# Patient Record
Sex: FEMALE | Race: WHITE | NOT HISPANIC OR LATINO | Employment: OTHER | URBAN - METROPOLITAN AREA
[De-identification: names, ages, dates, MRNs, and addresses within clinical notes are randomized per-mention and may not be internally consistent; named-entity substitution may affect disease eponyms.]

---

## 2018-12-07 ENCOUNTER — HOSPITAL ENCOUNTER (EMERGENCY)
Facility: HOSPITAL | Age: 23
Discharge: HOME | End: 2018-12-07
Attending: EMERGENCY MEDICINE | Admitting: EMERGENCY MEDICINE
Payer: MEDICARE

## 2018-12-07 ENCOUNTER — APPOINTMENT (EMERGENCY)
Dept: CT IMAGING | Facility: HOSPITAL | Age: 23
End: 2018-12-07
Payer: MEDICARE

## 2018-12-07 VITALS
SYSTOLIC BLOOD PRESSURE: 107 MMHG | WEIGHT: 190 LBS | DIASTOLIC BLOOD PRESSURE: 58 MMHG | HEART RATE: 98 BPM | RESPIRATION RATE: 18 BRPM | TEMPERATURE: 96.8 F | BODY MASS INDEX: 34.96 KG/M2 | HEIGHT: 62 IN | OXYGEN SATURATION: 96 %
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
## 2018-12-07 DIAGNOSIS — G44.309 POST-TRAUMATIC HEADACHE: Primary | ICD-10-CM

## 2018-12-07 PROCEDURE — 70450 CT HEAD/BRAIN W/O DYE: CPT

## 2018-12-07 PROCEDURE — 99283 EMERGENCY DEPT VISIT LOW MDM: CPT

## 2018-12-07 RX ORDER — POLYETHYLENE GLYCOL 3350 17 G/17G
17 POWDER, FOR SOLUTION ORAL DAILY
COMMUNITY
End: 2019-07-31

## 2018-12-07 RX ORDER — LORAZEPAM 1 MG/1
0.5 TABLET ORAL EVERY 8 HOURS PRN
COMMUNITY
End: 2019-06-12

## 2018-12-07 RX ORDER — DOCUSATE SODIUM 100 MG/1
100 CAPSULE, LIQUID FILLED ORAL 2 TIMES DAILY
COMMUNITY
End: 2019-09-04 | Stop reason: HOSPADM

## 2018-12-07 RX ORDER — ACETAMINOPHEN 325 MG/1
650 TABLET ORAL EVERY 6 HOURS PRN
COMMUNITY
End: 2019-09-04 | Stop reason: HOSPADM

## 2018-12-07 RX ORDER — LAMOTRIGINE 100 MG/1
25 TABLET ORAL DAILY
COMMUNITY
End: 2019-06-12

## 2018-12-07 RX ORDER — ACETAMINOPHEN 325 MG/1
650 TABLET ORAL ONCE
Status: COMPLETED | OUTPATIENT
Start: 2018-12-07 | End: 2018-12-07

## 2018-12-07 RX ORDER — VENLAFAXINE HYDROCHLORIDE 150 MG/1
150 CAPSULE, EXTENDED RELEASE ORAL DAILY
COMMUNITY
End: 2019-07-17 | Stop reason: HOSPADM

## 2018-12-07 RX ORDER — DIVALPROEX SODIUM 500 MG/1
750 TABLET, EXTENDED RELEASE ORAL 2 TIMES DAILY
COMMUNITY
End: 2019-06-26 | Stop reason: HOSPADM

## 2018-12-07 RX ORDER — PSEUDOEPHEDRINE HCL 60 MG/1
60 TABLET ORAL EVERY 4 HOURS PRN
COMMUNITY
End: 2019-07-31

## 2018-12-07 RX ORDER — NORETHINDRONE ACETATE AND ETHINYL ESTRADIOL 1.5-30(21)
1 KIT ORAL DAILY
COMMUNITY
End: 2019-09-04 | Stop reason: HOSPADM

## 2018-12-07 RX ORDER — CARBOXYMETHYLCELLULOSE SODIUM 10 MG/ML
GEL OPHTHALMIC
COMMUNITY
End: 2019-07-31

## 2018-12-07 RX ORDER — MULTIVITAMIN
1 CAPSULE ORAL DAILY
COMMUNITY
End: 2019-07-31

## 2018-12-07 RX ORDER — QUETIAPINE FUMARATE 200 MG/1
300 TABLET, FILM COATED ORAL 2 TIMES DAILY
COMMUNITY
End: 2019-06-26 | Stop reason: HOSPADM

## 2018-12-07 RX ORDER — MELATONIN 10 MG
TABLET, SUBLINGUAL SUBLINGUAL
COMMUNITY
End: 2019-09-04 | Stop reason: HOSPADM

## 2018-12-07 RX ADMIN — ACETAMINOPHEN 650 MG: 325 TABLET, FILM COATED ORAL at 19:15

## 2018-12-07 NOTE — ED PROVIDER NOTES
History  Chief Complaint   Patient presents with    Head Injury     pt presents to ED c/o nausea and dizziness since she fell and hit her head during a football game  Denies LOC      49-year-old female with history of anxiety, depression, and traumatic brain injury presents emergency department for evaluation of headache and vomiting after a mechanical ground level fall earlier in the day  Patient states that approximately 4 hours prior to arrival, she slipped while playing outdoors with some friends, striking the back of her head  There was no loss of consciousness however she did vomit after the initial injury  At this time patient complains of lightheadedness, denies vertigo, denies vision changes, neck pain, extremity paresthesias, chest pain, shortness of breath  She does not take any blood thinning medications  Prior to Admission Medications   Prescriptions Last Dose Informant Patient Reported? Taking?    Carboxymethylcellulose Sod PF (REFRESH CELLUVISC) 1 % GEL   Yes Yes   Sig: Apply to eye   Cetirizine HCl 10 MG CAPS   Yes Yes   Sig: Take by mouth   Cholecalciferol (VITAMIN D3) 01560 units TABS   Yes Yes   Sig: Take by mouth   LORazepam (ATIVAN) 1 mg tablet   Yes Yes   Sig: Take 0 5 mg by mouth every 8 (eight) hours as needed for anxiety   Multiple Vitamin (MULTIVITAMIN) capsule   Yes Yes   Sig: Take 1 capsule by mouth daily   QUEtiapine (SEROquel) 200 mg tablet   Yes Yes   Sig: Take 25 mg by mouth daily at bedtime   acetaminophen (TYLENOL) 325 mg tablet   Yes Yes   Sig: Take 650 mg by mouth every 6 (six) hours as needed for mild pain   divalproex sodium (DEPAKOTE ER) 500 mg 24 hr tablet   Yes Yes   Sig: Take 1,000 mg by mouth daily   docusate sodium (COLACE) 100 mg capsule   Yes Yes   Sig: Take 100 mg by mouth 2 (two) times a day   fluticasone (FLOVENT DISKUS) 50 MCG/BLIST diskus inhaler   Yes Yes   Sig: Inhale 1 puff 2 (two) times a day   lamoTRIgine (LaMICtal) 100 mg tablet   Yes Yes Sig: Take 25 mg by mouth daily   norethindrone-ethinyl estradiol-iron (MICROGESTIN FE1 5/30) 1 5-30 MG-MCG tablet   Yes Yes   Sig: Take 1 tablet by mouth daily   polyethylene glycol (MIRALAX) 17 g packet   Yes Yes   Sig: Take 17 g by mouth daily   pseudoephedrine (SUDAFED) 60 mg tablet   Yes Yes   Sig: Take 60 mg by mouth every 4 (four) hours as needed for congestion   venlafaxine (EFFEXOR-XR) 150 mg 24 hr capsule   Yes Yes   Sig: Take 150 mg by mouth daily      Facility-Administered Medications: None       Past Medical History:   Diagnosis Date    Anxiety     Depression     Seizures ([facility] [place] [address] )     Traumatic brain injury ([facility] )        History reviewed  No pertinent surgical history  History reviewed  No pertinent family history  I have reviewed and agree with the history as documented  Social History   Substance Use Topics    Smoking status: Never Smoker    Smokeless tobacco: Never Used    Alcohol use No        Review of Systems   Constitutional: Negative for appetite change, chills, diaphoresis and fever  HENT: Negative for congestion and sore throat  Eyes: Negative for photophobia and visual disturbance  Respiratory: Negative for cough and shortness of breath  Cardiovascular: Negative for chest pain  Gastrointestinal: Positive for nausea and vomiting  Musculoskeletal: Negative for back pain and neck pain  Skin: Negative for rash  Neurological: Positive for headaches  Negative for dizziness, weakness, light-headedness and numbness  Hematological: Does not bruise/bleed easily  Psychiatric/Behavioral: Negative for confusion and hallucinations  Physical Exam  Physical Exam   Constitutional: She is oriented to person, place, and time  She appears well-developed and well-nourished  No distress  HENT:   Head: Normocephalic and atraumatic  Mouth/Throat: Oropharynx is clear and moist    Eyes: Pupils are equal, round, and reactive to light  No scleral icterus     Cardiovascular: Normal rate and regular rhythm  Exam reveals no gallop and no friction rub  No murmur heard  Pulmonary/Chest: No respiratory distress  She has no wheezes  She has no rales  Abdominal: Soft  Bowel sounds are normal  She exhibits no distension  There is no tenderness  Musculoskeletal:        Cervical back: She exhibits normal range of motion, no bony tenderness and no deformity  Neurological: She is alert and oriented to person, place, and time  No cranial nerve deficit  Gait steady without deficit  Cerebellar testing normal, no dysdiadochokinesia or dysmetria  Bilateral upper and lower extremity sensation and strength is intact in all fields and symmetric   Skin: Skin is dry  Capillary refill takes less than 2 seconds  She is not diaphoretic  Psychiatric: She has a normal mood and affect  Her behavior is normal    Vitals reviewed  Vital Signs  ED Triage Vitals [12/07/18 1823]   Temperature Pulse Respirations Blood Pressure SpO2   (!) 96 8 °F (36 °C) (!) 109 19 114/56 96 %      Temp Source Heart Rate Source Patient Position - Orthostatic VS BP Location FiO2 (%)   Temporal Monitor Sitting Right arm --      Pain Score       No Pain           Vitals:    12/07/18 1823 12/07/18 1830 12/07/18 1845   BP: 114/56 103/57 107/58   Pulse: (!) 109 102 98   Patient Position - Orthostatic VS: Sitting         Visual Acuity      ED Medications  Medications   acetaminophen (TYLENOL) tablet 650 mg (650 mg Oral Given 12/7/18 1915)       Diagnostic Studies  Results Reviewed     None                 CT head without contrast   Final Result by Nancy Carson MD (12/07 1907)   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  No acute intracranial abnormality                    Workstation performed: [id number]                    Procedures  Procedures       Phone Contacts  ED Phone Contact    ED Course                               MDM  Number of Diagnoses or Management Options  Fall from ground level:   Post-traumatic headache:   Diagnosis management comments: 75-year-old female presents with headache and vomiting after mechanical ground level fall  Given her history of traumatic brain injury, although her neurologic exam was nonfocal, CT of the head was obtained to rule out ICH or skull fracture  CT of the head was unremarkable  Patient educated on concussion precautions, recommend Tylenol as needed for pain control  Amount and/or Complexity of Data Reviewed  Tests in the radiology section of CPT®: ordered and reviewed  Review and summarize past medical records: yes  Independent visualization of images, tracings, or specimens: yes      CritCare Time    Disposition  Final diagnoses:   Post-traumatic headache   Fall from ground level     Time reflects when diagnosis was documented in both MDM as applicable and the Disposition within this note     Time User Action Codes Description Comment    12/7/2018  7:09 PM Luba Tsang [[id number]] Post-traumatic headache     12/7/2018  7:09 PM Cristina Fernandez Fall from ground level       ED Disposition     ED Disposition Condition Comment    Discharge  Tameka Alvarado discharge to home/self care  Condition at discharge: Good        Follow-up Information     Follow up With Specialties Details Why Contact Maude Ro, DO   As needed [facility] [address] [facility] [place]            Discharge Medication List as of 12/7/2018  7:09 PM      CONTINUE these medications which have NOT CHANGED    Details   acetaminophen (TYLENOL) 325 mg tablet Take 650 mg by mouth every 6 (six) hours as needed for mild pain, Historical Med      Carboxymethylcellulose Sod PF (REFRESH CELLUVISC) 1 % GEL Apply to eye, Historical Med      Cetirizine HCl 10 MG CAPS Take by mouth, Historical Med      Cholecalciferol (VITAMIN D3) 96329 units TABS Take by mouth, Historical Med      divalproex sodium (DEPAKOTE ER) 500 mg 24 hr tablet Take 1,000 mg by mouth daily, Historical Med      docusate sodium (COLACE) 100 mg capsule Take 100 mg by mouth 2 (two) times a day, Historical Med      fluticasone (FLOVENT DISKUS) 50 MCG/BLIST diskus inhaler Inhale 1 puff 2 (two) times a day, Historical Med      lamoTRIgine (LaMICtal) 100 mg tablet Take 25 mg by mouth daily, Historical Med      LORazepam (ATIVAN) 1 mg tablet Take 0 5 mg by mouth every 8 (eight) hours as needed for anxiety, Historical Med      Multiple Vitamin (MULTIVITAMIN) capsule Take 1 capsule by mouth daily, Historical Med      norethindrone-ethinyl estradiol-iron (MICROGESTIN FE1 5/30) 1 5-30 MG-MCG tablet Take 1 tablet by mouth daily, Historical Med      polyethylene glycol (MIRALAX) 17 g packet Take 17 g by mouth daily, Historical Med      pseudoephedrine (SUDAFED) 60 mg tablet Take 60 mg by mouth every 4 (four) hours as needed for congestion, Historical Med      QUEtiapine (SEROquel) 200 mg tablet Take 25 mg by mouth daily at bedtime, Historical Med      venlafaxine (EFFEXOR-XR) 150 mg 24 hr capsule Take 150 mg by mouth daily, Historical Med           No discharge procedures on file      ED Provider  Electronically Signed by           Darrin Chan PA-C  12/07/18 4004

## 2018-12-08 NOTE — DISCHARGE INSTRUCTIONS
Acute Headache   WHAT YOU NEED TO KNOW:   An acute headache is pain or discomfort that starts suddenly and gets worse quickly  You may have an acute headache only when you feel stress or eat certain foods  Other acute headache pain can happen every day, and sometimes several times a day  DISCHARGE INSTRUCTIONS:   Return to the emergency department if:   · You have severe pain  · You have numbness or weakness on one side of your face or body  · You have a headache that occurs after a blow to the head, a fall, or other trauma  · You have a headache, are forgetful or confused, or have trouble speaking  · You have a headache, stiff neck, and a fever  Contact your healthcare provider if:   · You have a constant headache and are vomiting  · You have a headache each day that does not get better, even after treatment  · You have changes in your headaches, or new symptoms that occur when you have a headache  · You have questions or concerns about your condition or care  Medicines: You may need any of the following:  · Prescription pain medicine  may be given  The medicine your healthcare provider recommends will depend on the kind of headaches you have  You will need to take prescription headache medicines as directed to prevent a problem called rebound headache  These headaches happen with regular use of pain relievers for headache disorders  · NSAIDs , such as ibuprofen, help decrease swelling, pain, and fever  This medicine is available with or without a doctor's order  NSAIDs can cause stomach bleeding or kidney problems in certain people  If you take blood thinner medicine, always ask your healthcare provider if NSAIDs are safe for you  Always read the medicine label and follow directions  · Acetaminophen  decreases pain and fever  It is available without a doctor's order  Ask how much to take and how often to take it  Follow directions   Read the labels of all other medicines you are using to see if they also contain acetaminophen, or ask your doctor or pharmacist  Acetaminophen can cause liver damage if not taken correctly  Do not use more than 3 grams (3,000 milligrams) total of acetaminophen in one day  · Antidepressants  may be given for some kinds of headaches  · Take your medicine as directed  Contact your healthcare provider if you think your medicine is not helping or if you have side effects  Tell him or her if you are allergic to any medicine  Keep a list of the medicines, vitamins, and herbs you take  Include the amounts, and when and why you take them  Bring the list or the pill bottles to follow-up visits  Carry your medicine list with you in case of an emergency  Manage your symptoms:   · Apply heat or ice  on the headache area  Use a heat or ice pack  For an ice pack, you can also put crushed ice in a plastic bag  Cover the pack or bag with a towel before you apply it to your skin  Ice and heat both help decrease pain, and heat also helps decrease muscle spasms  Apply heat for 20 to 30 minutes every 2 hours  Apply ice for 15 to 20 minutes every hour  Apply heat or ice for as long and for as many days as directed  You may alternate heat and ice  · Relax your muscles  Lie down in a comfortable position and close your eyes  Relax your muscles slowly  Start at your toes and work your way up your body  · Keep a record of your headaches  Write down when your headaches start and stop  Include your symptoms and what you were doing when the headache began  Record what you ate or drank for 24 hours before the headache started  Describe the pain and where it hurts  Keep track of what you did to treat your headache and if it worked  Prevent an acute headache:   · Avoid anything that triggers an acute headache  Examples include exposure to chemicals, going to high altitude, or not getting enough sleep  Create a regular sleep routine   Go to sleep at the same time and wake up at the same time each day  Do not use electronic devices before bedtime  These may trigger a headache or prevent you from sleeping well  · Do not smoke  Nicotine and other chemicals in cigarettes and cigars can trigger an acute headache or make it worse  Ask your healthcare provider for information if you currently smoke and need help to quit  E-cigarettes or smokeless tobacco still contain nicotine  Talk to your healthcare provider before you use these products  · Limit alcohol as directed  Alcohol can trigger an acute headache or make it worse  If you have cluster headaches, do not drink alcohol during an episode  For other types of headaches, ask your healthcare provider if it is safe for you to drink alcohol  Ask how much is safe for you to drink, and how often  · Exercise as directed  Exercise can reduce tension and help with headache pain  Aim for 30 minutes of physical activity on most days of the week  Your healthcare provider can help you create an exercise plan  · Eat a variety of healthy foods  Healthy foods include fruits, vegetables, low-fat dairy products, lean meats, fish, whole grains, and cooked beans  Your healthcare provider or dietitian can help you create meals plans if you need to avoid foods that trigger headaches  Follow up with your healthcare provider as directed:  Bring your headache record with you when you see your healthcare provider  Write down your questions so you remember to ask them during your visits  © 2017 [employer] [facility] Information is for End User's use only and may not be sold, redistributed or otherwise used for commercial purposes  All illustrations and images included in CareNotes® are the copyrighted property of A D [employer] M [employer]  or [employer]  The above information is an  only  It is not intended as medical advice for individual conditions or treatments   Talk to your doctor, nurse or pharmacist before following any medical regimen to see if it is safe and effective for you

## 2019-03-21 ENCOUNTER — HOSPITAL ENCOUNTER (EMERGENCY)
Facility: HOSPITAL | Age: 24
Discharge: HOME | End: 2019-03-22
Attending: EMERGENCY MEDICINE
Payer: MEDICARE

## 2019-03-21 VITALS
HEART RATE: 111 BPM | TEMPERATURE: 97.5 F | RESPIRATION RATE: 18 BRPM | DIASTOLIC BLOOD PRESSURE: 63 MMHG | OXYGEN SATURATION: 98 % | SYSTOLIC BLOOD PRESSURE: 120 MMHG

## 2019-03-21 DIAGNOSIS — F32.A DEPRESSION: Primary | ICD-10-CM

## 2019-03-21 DIAGNOSIS — R45.851 SUICIDAL IDEATION: ICD-10-CM

## 2019-03-21 LAB
ALBUMIN SERPL BCP-MCNC: 3.5 G/DL (ref 3.5–5)
ALP SERPL-CCNC: 40 U/L (ref 46–116)
ALT SERPL W P-5'-P-CCNC: 51 U/L (ref 12–78)
AMPHETAMINES SERPL QL SCN: NEGATIVE
ANION GAP SERPL CALCULATED.3IONS-SCNC: 9 MMOL/L (ref 4–13)
AST SERPL W P-5'-P-CCNC: 24 U/L (ref 5–45)
BARBITURATES UR QL: NEGATIVE
BASOPHILS # BLD AUTO: 0.01 THOUSANDS/ΜL (ref 0–0.1)
BASOPHILS NFR BLD AUTO: 0 % (ref 0–1)
BENZODIAZ UR QL: NEGATIVE
BILIRUB SERPL-MCNC: 0.2 MG/DL (ref 0.2–1)
BUN SERPL-MCNC: 14 MG/DL (ref 5–25)
CALCIUM SERPL-MCNC: 9.1 MG/DL (ref 8.3–10.1)
CHLORIDE SERPL-SCNC: 103 MMOL/L (ref 100–108)
CO2 SERPL-SCNC: 27 MMOL/L (ref 21–32)
COCAINE UR QL: NEGATIVE
CREAT SERPL-MCNC: 0.89 MG/DL (ref 0.6–1.3)
EOSINOPHIL # BLD AUTO: 0 THOUSAND/ΜL (ref 0–0.61)
EOSINOPHIL NFR BLD AUTO: 0 % (ref 0–6)
ERYTHROCYTE [DISTWIDTH] IN BLOOD BY AUTOMATED COUNT: 12 % (ref 11.6–15.1)
ETHANOL EXG-MCNC: 0 MG/DL
GFR SERPL CREATININE-BSD FRML MDRD: 90 ML/MIN/1.73SQ M
GLUCOSE SERPL-MCNC: 100 MG/DL (ref 65–140)
HCT VFR BLD AUTO: 40.8 % (ref 34.8–46.1)
HGB BLD-MCNC: 13.5 G/DL (ref 11.5–15.4)
IMM GRANULOCYTES # BLD AUTO: 0.01 THOUSAND/UL (ref 0–0.2)
IMM GRANULOCYTES NFR BLD AUTO: 0 % (ref 0–2)
LYMPHOCYTES # BLD AUTO: 1.96 THOUSANDS/ΜL (ref 0.6–4.47)
LYMPHOCYTES NFR BLD AUTO: 40 % (ref 14–44)
MCH RBC QN AUTO: 32.5 PG (ref 26.8–34.3)
MCHC RBC AUTO-ENTMCNC: 33.1 G/DL (ref 31.4–37.4)
MCV RBC AUTO: 98 FL (ref 82–98)
METHADONE UR QL: NEGATIVE
MONOCYTES # BLD AUTO: 0.35 THOUSAND/ΜL (ref 0.17–1.22)
MONOCYTES NFR BLD AUTO: 7 % (ref 4–12)
NEUTROPHILS # BLD AUTO: 2.61 THOUSANDS/ΜL (ref 1.85–7.62)
NEUTS SEG NFR BLD AUTO: 53 % (ref 43–75)
NRBC BLD AUTO-RTO: 0 /100 WBCS
OPIATES UR QL SCN: NEGATIVE
PCP UR QL: NEGATIVE
PLATELET # BLD AUTO: 169 THOUSANDS/UL (ref 149–390)
PMV BLD AUTO: 9.5 FL (ref 8.9–12.7)
POTASSIUM SERPL-SCNC: 4 MMOL/L (ref 3.5–5.3)
PROT SERPL-MCNC: 7 G/DL (ref 6.4–8.2)
RBC # BLD AUTO: 4.15 MILLION/UL (ref 3.81–5.12)
SODIUM SERPL-SCNC: 139 MMOL/L (ref 136–145)
THC UR QL: NEGATIVE
VALPROATE SERPL-MCNC: 80 UG/ML (ref 50–100)
WBC # BLD AUTO: 4.94 THOUSAND/UL (ref 4.31–10.16)

## 2019-03-21 PROCEDURE — 82075 ASSAY OF BREATH ETHANOL: CPT | Performed by: EMERGENCY MEDICINE

## 2019-03-21 PROCEDURE — 99285 EMERGENCY DEPT VISIT HI MDM: CPT

## 2019-03-21 PROCEDURE — 80164 ASSAY DIPROPYLACETIC ACD TOT: CPT | Performed by: EMERGENCY MEDICINE

## 2019-03-21 PROCEDURE — 85025 COMPLETE CBC W/AUTO DIFF WBC: CPT | Performed by: EMERGENCY MEDICINE

## 2019-03-21 PROCEDURE — 80307 DRUG TEST PRSMV CHEM ANLYZR: CPT | Performed by: EMERGENCY MEDICINE

## 2019-03-21 PROCEDURE — 80053 COMPREHEN METABOLIC PANEL: CPT | Performed by: EMERGENCY MEDICINE

## 2019-03-21 PROCEDURE — 36415 COLL VENOUS BLD VENIPUNCTURE: CPT | Performed by: EMERGENCY MEDICINE

## 2019-03-22 NOTE — ED NOTES
Awaiting crisis  Pt currently calm, cooperative and appropriate  Pt is A=ox4  resp unlabored   Care taker bedside     John Mohamud RN  03/21/19 9098

## 2019-03-22 NOTE — ED PROVIDER NOTES
History  Chief Complaint   Patient presents with    Suicidal     Pt was having rough day and decided that she wnated to kill herself  Pt has no plan  Pt wants to put a bomb in the [place] building  This is a 57-year-old female who presents from [facility] for crisis evaluation  She states that she has had a stressful day and is having suicidal ideations without any specific plan  She has a history of traumatic brain injury and is accompanied by caretaker  History provided by:  Patient and caregiver  Psychiatric Evaluation   Presenting symptoms: suicidal thoughts    Patient accompanied by:  Caregiver  Onset quality:  Unable to specify  Duration:  1 day  Timing:  Constant  Progression:  Unchanged  Chronicity:  Recurrent      Prior to Admission Medications   Prescriptions Last Dose Informant Patient Reported? Taking?    Carboxymethylcellulose Sod PF (REFRESH CELLUVISC) 1 % GEL   Yes No   Sig: Apply to eye   Cetirizine HCl 10 MG CAPS   Yes No   Sig: Take by mouth   Cholecalciferol (VITAMIN D3) 73769 units TABS   Yes No   Sig: Take by mouth   LORazepam (ATIVAN) 1 mg tablet   Yes No   Sig: Take 0 5 mg by mouth every 8 (eight) hours as needed for anxiety   Multiple Vitamin (MULTIVITAMIN) capsule   Yes No   Sig: Take 1 capsule by mouth daily   QUEtiapine (SEROquel) 200 mg tablet   Yes No   Sig: Take 25 mg by mouth daily at bedtime   acetaminophen (TYLENOL) 325 mg tablet   Yes No   Sig: Take 650 mg by mouth every 6 (six) hours as needed for mild pain   divalproex sodium (DEPAKOTE ER) 500 mg 24 hr tablet   Yes No   Sig: Take 1,000 mg by mouth daily   docusate sodium (COLACE) 100 mg capsule   Yes No   Sig: Take 100 mg by mouth 2 (two) times a day   fluticasone (FLOVENT DISKUS) 50 MCG/BLIST diskus inhaler   Yes No   Sig: Inhale 1 puff 2 (two) times a day   lamoTRIgine (LaMICtal) 100 mg tablet   Yes No   Sig: Take 25 mg by mouth daily   norethindrone-ethinyl estradiol-iron (MICROGESTIN FE1 5/30) 1 5-30 MG-MCG tablet   Yes No   Sig: Take 1 tablet by mouth daily   polyethylene glycol (MIRALAX) 17 g packet   Yes No   Sig: Take 17 g by mouth daily   pseudoephedrine (SUDAFED) 60 mg tablet   Yes No   Sig: Take 60 mg by mouth every 4 (four) hours as needed for congestion   venlafaxine (EFFEXOR-XR) 150 mg 24 hr capsule   Yes No   Sig: Take 150 mg by mouth daily      Facility-Administered Medications: None       Past Medical History:   Diagnosis Date    Anxiety     Depression     Seizures ([facility] )     Traumatic brain injury ([facility] )        History reviewed  No pertinent surgical history  History reviewed  No pertinent family history  I have reviewed and agree with the history as documented  Social History     Tobacco Use    Smoking status: Never Smoker    Smokeless tobacco: Never Used   Substance Use Topics    Alcohol use: No    Drug use: No        Review of Systems   Psychiatric/Behavioral: Positive for suicidal ideas  All other systems reviewed and are negative  Physical Exam  Physical Exam   Constitutional: She is oriented to person, place, and time  She appears well-developed and well-nourished  No distress  HENT:   Right Ear: External ear normal    Left Ear: External ear normal    Nose: Nose normal    Mouth/Throat: Oropharynx is clear and moist    Eyes: Pupils are equal, round, and reactive to light  EOM are normal  Right eye exhibits no discharge  Left eye exhibits no discharge  No scleral icterus  Neck: Neck supple  No JVD present  No tracheal deviation present  Cardiovascular: Regular rhythm and intact distal pulses  Exam reveals no gallop and no friction rub  No murmur heard  Tachycardic   Pulmonary/Chest: Effort normal and breath sounds normal  No stridor  No respiratory distress  She has no wheezes  She has no rales  Abdominal: Soft  Bowel sounds are normal  She exhibits no distension and no mass  There is no tenderness  There is no rebound and no guarding     Musculoskeletal: Normal range of motion  She exhibits no edema, tenderness or deformity  Neurological: She is alert and oriented to person, place, and time  No cranial nerve deficit or sensory deficit  She exhibits normal muscle tone  Skin: Skin is warm and dry  No rash noted  She is not diaphoretic  Psychiatric:   Flat affect with suicidal ideations without specific plan   Nursing note and vitals reviewed        Vital Signs  ED Triage Vitals [03/21/19 2051]   Temperature Pulse Respirations Blood Pressure SpO2   97 5 °F (36 4 °C) (!) 111 18 120/63 98 %      Temp Source Heart Rate Source Patient Position - Orthostatic VS BP Location FiO2 (%)   Temporal Monitor Sitting Right arm --      Pain Score       --           Vitals:    03/21/19 2051   BP: 120/63   Pulse: (!) 111   Patient Position - Orthostatic VS: Sitting         Visual Acuity      ED Medications  Medications - No data to display    Diagnostic Studies  Results Reviewed     Procedure Component Value Units Date/Time    Valproic acid level, total [[id number]]  (Normal) Collected:  03/21/19 2120    Lab Status:  Final result Specimen:  Blood from Arm, Left Updated:  03/21/19 2158     Valproic Acid, Total 80 ug/mL     Comprehensive metabolic panel [[id number]]  (Abnormal) Collected:  03/21/19 2120    Lab Status:  Final result Specimen:  Blood from Arm, Left Updated:  03/21/19 2156     Sodium 139 mmol/L      Potassium 4 0 mmol/L      Chloride 103 mmol/L      CO2 27 mmol/L      ANION GAP 9 mmol/L      BUN 14 mg/dL      Creatinine 0 89 mg/dL      Glucose 100 mg/dL      Calcium 9 1 mg/dL      AST 24 U/L      ALT 51 U/L      Alkaline Phosphatase 40 U/L      Total Protein 7 0 g/dL      Albumin 3 5 g/dL      Total Bilirubin 0 20 mg/dL      eGFR 90 ml/min/1 73sq m     Narrative:       National Kidney Disease Education Program recommendations are as follows:  GFR calculation is accurate only with a steady state creatinine  Chronic Kidney disease less than 60 ml/min/1 73 sq  meters  Kidney failure less than 15 ml/min/1 73 sq  meters  Rapid drug screen, urine [[id number]]  (Normal) Collected:  03/21/19 2122    Lab Status:  Final result Specimen:  Urine, Clean Catch Updated:  03/21/19 2149     Amph/Meth UR Negative     Barbiturate Ur Negative     Benzodiazepine Urine Negative     Cocaine Urine Negative     Methadone Urine Negative     Opiate Urine Negative     PCP Ur Negative     THC Urine Negative    Narrative:       FOR MEDICAL PURPOSES ONLY  IF CONFIRMATION NEEDED PLEASE CONTACT THE LAB WITHIN 5 DAYS    Drug Screen Cutoff Levels:  AMPHETAMINE/METHAMPHETAMINES  1000 ng/mL  BARBITURATES     200 ng/mL  BENZODIAZEPINES     200 ng/mL  COCAINE      300 ng/mL  METHADONE      300 ng/mL  OPIATES      300 ng/mL  PHENCYCLIDINE     25 ng/mL  THC       50 ng/mL    CBC and differential [[id number]] Collected:  03/21/19 2120    Lab Status:  Final result Specimen:  Blood from Arm, Left Updated:  03/21/19 2140     WBC 4 94 Thousand/uL      RBC 4 15 Million/uL      Hemoglobin 13 5 g/dL      Hematocrit 40 8 %      MCV 98 fL      MCH 32 5 pg      MCHC 33 1 g/dL      RDW 12 0 %      MPV 9 5 fL      Platelets 100 Thousands/uL      nRBC 0 /100 WBCs      Neutrophils Relative 53 %      Immat GRANS % 0 %      Lymphocytes Relative 40 %      Monocytes Relative 7 %      Eosinophils Relative 0 %      Basophils Relative 0 %      Neutrophils Absolute 2 61 Thousands/µL      Immature Grans Absolute 0 01 Thousand/uL      Lymphocytes Absolute 1 96 Thousands/µL      Monocytes Absolute 0 35 Thousand/µL      Eosinophils Absolute 0 00 Thousand/µL      Basophils Absolute 0 01 Thousands/µL     POCT alcohol breath test [[id number]]  (Normal) Resulted:  03/21/19 2135    Lab Status:  Final result Updated:  03/21/19 2135     EXTBreath Alcohol 0 000                 No orders to display              Procedures  Procedures       Phone Contacts  ED Phone Contact    ED Course  ED Course as of Mar 21 2336   Thu Mar 21, 2019   2334 Evaluated by crisis at this time patient stable for discharge and outpatient follow-up she has suicidal thoughts without any feasible plan  [facility]  Number of Diagnoses or Management Options  Diagnosis management comments: Suicidal ideations will clear her medically and have crisis evaluation       Amount and/or Complexity of Data Reviewed  Clinical lab tests: ordered        Disposition  Final diagnoses:   Depression   Suicidal ideation     Time reflects when diagnosis was documented in both MDM as applicable and the Disposition within this note     Time User Action Codes Description Comment    3/21/2019 11:36 PM Rebecca Byrneg [F32 9] Depression     3/21/2019 11:36 PM Earnesteen Peace Add [O52 507] Suicidal ideation       ED Disposition     ED Disposition Condition Date/Time Comment    Discharge Stable Thu Mar 21, 2019 11:36 PM Elva Alvarado discharge to home/self care  Follow-up Information     Follow up With Specialties Details Why Contact Maude Ro DO  In 1 day Also with outpatient referrals given by sadiq [facility]  [address] [facility]            Patient's Medications   Discharge Prescriptions    No medications on file     No discharge procedures on file      ED Provider  Electronically Signed by           Marium Leblanc DO  03/21/19 8901

## 2019-03-22 NOTE — ED NOTES
Per charge RN no staffing to place 1:1 bedside with pt  Pt in view of nursing station   Caretaker also bedside     Myke Avila RN  03/21/19 7093

## 2019-03-22 NOTE — ED NOTES
Patient lives at [facility] (a residential program for adults with TBIs)  Through that program she also has day programming, psychiatry, therapy, etc  She was at her day program today and reports that several frustrating things happened  She is not specific about what, but states that when she got home from the program she was very frustrated and went to her room  She says she was angry at at that time had the thought that she'd like to bomb her day program and also says she had the thought that she just wanted to die because she was so angry  She was able to remain calm, however, and asked staff for her PRN  It took a while for them to be able to provide it to her due to staffing issues, but she still remained calm and took her PRN medication when it was finally provided  She ate dinner and still felt a little angry so she asked staff to come to the ED  Here she denies ever having a plan to actually kill herself, and says she was hoping that maybe she could just sleep overnight tonight in the ED so that she was less likely to think about her day program and also so that she didn't become angry again back at home  She did state that she felt totally safe at home, however, and her staff reported that she has no access to anything she could harm herself with there  Crisis worker explained to pt that it wasn't possible to just sleep in the ED overnight, and that unless we felt she needed inpatient treatment for a period of days or so, she wouldn't be able to remain here  She expressed understanding, and is ok with going home  Her staff (who said she was new to work at the program)  asked what to do if she became angry again later and actually did try to harm herself or someone else  She was told to call 911 at that point and have the pt returned  Discussed case with Dr More Abdalla who will discharge pt back to her group home for outpatient follow up

## 2019-03-22 NOTE — ED NOTES
Pt changed into psychiatric attire   Belongings secured and inventoried     Kenia Israel RN  03/21/19 1909

## 2019-06-12 ENCOUNTER — HOSPITAL ENCOUNTER (INPATIENT)
Facility: HOSPITAL | Age: 24
LOS: 14 days | Discharge: HOME | DRG: 885 | End: 2019-06-26
Attending: EMERGENCY MEDICINE | Admitting: PSYCHIATRY & NEUROLOGY
Payer: MEDICARE

## 2019-06-12 DIAGNOSIS — F33.2 SEVERE EPISODE OF RECURRENT MAJOR DEPRESSIVE DISORDER, WITHOUT PSYCHOTIC FEATURES (HCC): Primary | Chronic | ICD-10-CM

## 2019-06-12 DIAGNOSIS — Z00.00 PHYSICAL EXAM: ICD-10-CM

## 2019-06-12 DIAGNOSIS — R79.89 ELEVATED TSH: ICD-10-CM

## 2019-06-12 DIAGNOSIS — F41.9 ANXIETY: ICD-10-CM

## 2019-06-12 DIAGNOSIS — R45.851 DEPRESSION WITH SUICIDAL IDEATION: ICD-10-CM

## 2019-06-12 DIAGNOSIS — F32.A DEPRESSION WITH SUICIDAL IDEATION: ICD-10-CM

## 2019-06-12 LAB
ALBUMIN SERPL BCP-MCNC: 4.2 G/DL (ref 3.5–5.7)
ALP SERPL-CCNC: 32 U/L (ref 40–150)
ALT SERPL W P-5'-P-CCNC: 16 U/L (ref 7–52)
AMPHETAMINES SERPL QL SCN: NEGATIVE
ANION GAP SERPL CALCULATED.3IONS-SCNC: 8 MMOL/L (ref 4–13)
AST SERPL W P-5'-P-CCNC: 20 U/L (ref 13–39)
BARBITURATES UR QL: NEGATIVE
BASOPHILS # BLD AUTO: 0 THOUSANDS/ΜL (ref 0–0.1)
BASOPHILS NFR BLD AUTO: 0 % (ref 0–2)
BENZODIAZ UR QL: NEGATIVE
BILIRUB SERPL-MCNC: 0.4 MG/DL (ref 0.2–1)
BUN SERPL-MCNC: 10 MG/DL (ref 7–25)
CALCIUM SERPL-MCNC: 9.8 MG/DL (ref 8.6–10.5)
CHLORIDE SERPL-SCNC: 102 MMOL/L (ref 98–107)
CO2 SERPL-SCNC: 26 MMOL/L (ref 21–31)
COCAINE UR QL: NEGATIVE
CREAT SERPL-MCNC: 1.01 MG/DL (ref 0.6–1.2)
EOSINOPHIL # BLD AUTO: 0 THOUSAND/ΜL (ref 0–0.61)
EOSINOPHIL NFR BLD AUTO: 0 % (ref 0–5)
ERYTHROCYTE [DISTWIDTH] IN BLOOD BY AUTOMATED COUNT: 12.8 % (ref 11.5–14.5)
ETHANOL SERPL-MCNC: <10 MG/DL
EXT PREG TEST URINE: NORMAL
GFR SERPL CREATININE-BSD FRML MDRD: 77 ML/MIN/1.73SQ M
GLUCOSE SERPL-MCNC: 91 MG/DL (ref 65–99)
HCT VFR BLD AUTO: 42.5 % (ref 42–47)
HGB BLD-MCNC: 14.7 G/DL (ref 12–16)
LYMPHOCYTES # BLD AUTO: 1.9 THOUSANDS/ΜL (ref 0.6–4.47)
LYMPHOCYTES NFR BLD AUTO: 38 % (ref 21–51)
MCH RBC QN AUTO: 34 PG (ref 26–34)
MCHC RBC AUTO-ENTMCNC: 34.6 G/DL (ref 31–37)
MCV RBC AUTO: 98 FL (ref 81–99)
METHADONE UR QL: NEGATIVE
MONOCYTES # BLD AUTO: 0.3 THOUSAND/ΜL (ref 0.17–1.22)
MONOCYTES NFR BLD AUTO: 6 % (ref 2–12)
NEUTROPHILS # BLD AUTO: 2.8 THOUSANDS/ΜL (ref 1.4–6.5)
NEUTS SEG NFR BLD AUTO: 56 % (ref 42–75)
OPIATES UR QL SCN: NEGATIVE
PCP UR QL: NEGATIVE
PLATELET # BLD AUTO: 149 THOUSANDS/UL (ref 149–390)
PMV BLD AUTO: 7.3 FL (ref 8.6–11.7)
POTASSIUM SERPL-SCNC: 3.8 MMOL/L (ref 3.5–5.5)
PROT SERPL-MCNC: 6.8 G/DL (ref 6.4–8.9)
RBC # BLD AUTO: 4.33 MILLION/UL (ref 3.9–5.2)
SODIUM SERPL-SCNC: 136 MMOL/L (ref 134–143)
THC UR QL: NEGATIVE
TSH SERPL DL<=0.05 MIU/L-ACNC: 6.07 UIU/ML (ref 0.45–5.33)
WBC # BLD AUTO: 5.1 THOUSAND/UL (ref 4.8–10.8)

## 2019-06-12 PROCEDURE — 81025 URINE PREGNANCY TEST: CPT | Performed by: EMERGENCY MEDICINE

## 2019-06-12 PROCEDURE — 80053 COMPREHEN METABOLIC PANEL: CPT | Performed by: PHYSICIAN ASSISTANT

## 2019-06-12 PROCEDURE — 85025 COMPLETE CBC W/AUTO DIFF WBC: CPT | Performed by: PHYSICIAN ASSISTANT

## 2019-06-12 PROCEDURE — 84443 ASSAY THYROID STIM HORMONE: CPT | Performed by: PHYSICIAN ASSISTANT

## 2019-06-12 PROCEDURE — 36415 COLL VENOUS BLD VENIPUNCTURE: CPT | Performed by: EMERGENCY MEDICINE

## 2019-06-12 PROCEDURE — 99285 EMERGENCY DEPT VISIT HI MDM: CPT

## 2019-06-12 PROCEDURE — 84439 ASSAY OF FREE THYROXINE: CPT | Performed by: PSYCHIATRY & NEUROLOGY

## 2019-06-12 PROCEDURE — 80307 DRUG TEST PRSMV CHEM ANLYZR: CPT | Performed by: EMERGENCY MEDICINE

## 2019-06-12 PROCEDURE — 80320 DRUG SCREEN QUANTALCOHOLS: CPT | Performed by: EMERGENCY MEDICINE

## 2019-06-12 PROCEDURE — 84481 FREE ASSAY (FT-3): CPT | Performed by: PSYCHIATRY & NEUROLOGY

## 2019-06-12 RX ORDER — DIPHENHYDRAMINE HCL 25 MG
25 TABLET ORAL EVERY 6 HOURS PRN
Status: DISCONTINUED | OUTPATIENT
Start: 2019-06-12 | End: 2019-06-26 | Stop reason: HOSPADM

## 2019-06-12 RX ORDER — TRAZODONE HYDROCHLORIDE 50 MG/1
50 TABLET ORAL
Status: DISCONTINUED | OUTPATIENT
Start: 2019-06-12 | End: 2019-06-26 | Stop reason: HOSPADM

## 2019-06-12 RX ORDER — ACETAMINOPHEN 325 MG/1
650 TABLET ORAL EVERY 6 HOURS PRN
Status: DISCONTINUED | OUTPATIENT
Start: 2019-06-12 | End: 2019-06-26 | Stop reason: HOSPADM

## 2019-06-12 RX ORDER — LORAZEPAM 0.5 MG/1
0.5 TABLET ORAL EVERY 6 HOURS PRN
Status: DISCONTINUED | OUTPATIENT
Start: 2019-06-12 | End: 2019-06-12

## 2019-06-12 RX ORDER — LORAZEPAM 1 MG/1
1 TABLET ORAL 3 TIMES DAILY
Status: DISCONTINUED | OUTPATIENT
Start: 2019-06-12 | End: 2019-06-26 | Stop reason: HOSPADM

## 2019-06-12 RX ORDER — VENLAFAXINE HYDROCHLORIDE 150 MG/1
150 CAPSULE, EXTENDED RELEASE ORAL DAILY
Status: DISCONTINUED | OUTPATIENT
Start: 2019-06-12 | End: 2019-06-12

## 2019-06-12 RX ORDER — FEXOFENADINE HCL 180 MG/1
180 TABLET ORAL DAILY
COMMUNITY
End: 2019-09-04 | Stop reason: HOSPADM

## 2019-06-12 RX ORDER — RISPERIDONE 1 MG/1
1 TABLET, ORALLY DISINTEGRATING ORAL EVERY 4 HOURS PRN
Status: DISCONTINUED | OUTPATIENT
Start: 2019-06-12 | End: 2019-06-26 | Stop reason: HOSPADM

## 2019-06-12 RX ORDER — DIVALPROEX SODIUM 500 MG/1
500 TABLET, EXTENDED RELEASE ORAL
Status: DISCONTINUED | OUTPATIENT
Start: 2019-06-12 | End: 2019-06-12

## 2019-06-12 RX ORDER — QUETIAPINE FUMARATE 100 MG/1
100 TABLET, FILM COATED ORAL EVERY 12 HOURS PRN
COMMUNITY
End: 2019-06-26 | Stop reason: HOSPADM

## 2019-06-12 RX ORDER — ACETAMINOPHEN 325 MG/1
650 TABLET ORAL EVERY 4 HOURS PRN
Status: DISCONTINUED | OUTPATIENT
Start: 2019-06-12 | End: 2019-06-26 | Stop reason: HOSPADM

## 2019-06-12 RX ORDER — LORAZEPAM 1 MG/1
1 TABLET ORAL 3 TIMES DAILY
Status: ON HOLD | COMMUNITY
End: 2019-09-03 | Stop reason: SDUPTHER

## 2019-06-12 RX ORDER — VENLAFAXINE HYDROCHLORIDE 150 MG/1
150 CAPSULE, EXTENDED RELEASE ORAL DAILY
Status: DISCONTINUED | OUTPATIENT
Start: 2019-06-12 | End: 2019-06-26 | Stop reason: HOSPADM

## 2019-06-12 RX ORDER — LAMOTRIGINE 100 MG/1
100 TABLET ORAL 2 TIMES DAILY
Status: ON HOLD | COMMUNITY
End: 2019-08-19

## 2019-06-12 RX ORDER — ACETAMINOPHEN 325 MG/1
975 TABLET ORAL EVERY 6 HOURS PRN
Status: DISCONTINUED | OUTPATIENT
Start: 2019-06-12 | End: 2019-06-26 | Stop reason: HOSPADM

## 2019-06-12 RX ORDER — BENZTROPINE MESYLATE 1 MG/ML
1 INJECTION INTRAMUSCULAR; INTRAVENOUS EVERY 6 HOURS PRN
Status: DISCONTINUED | OUTPATIENT
Start: 2019-06-12 | End: 2019-06-26 | Stop reason: HOSPADM

## 2019-06-12 RX ORDER — OLANZAPINE 5 MG/1
5 TABLET ORAL EVERY 8 HOURS PRN
Status: DISCONTINUED | OUTPATIENT
Start: 2019-06-12 | End: 2019-06-12

## 2019-06-12 RX ORDER — BENZTROPINE MESYLATE 1 MG/1
1 TABLET ORAL EVERY 6 HOURS PRN
Status: DISCONTINUED | OUTPATIENT
Start: 2019-06-12 | End: 2019-06-26 | Stop reason: HOSPADM

## 2019-06-12 RX ADMIN — LORAZEPAM 1 MG: 1 TABLET ORAL at 22:24

## 2019-06-12 RX ADMIN — DIVALPROEX SODIUM 750 MG: 500 TABLET, FILM COATED, EXTENDED RELEASE ORAL at 22:24

## 2019-06-12 RX ADMIN — VENLAFAXINE HYDROCHLORIDE 150 MG: 150 CAPSULE, EXTENDED RELEASE ORAL at 18:09

## 2019-06-12 RX ADMIN — QUETIAPINE FUMARATE 300 MG: 100 TABLET ORAL at 18:09

## 2019-06-12 RX ADMIN — LORAZEPAM 1 MG: 1 TABLET ORAL at 18:09

## 2019-06-13 PROBLEM — S06.9XAA TBI (TRAUMATIC BRAIN INJURY) (HCC): Chronic | Status: ACTIVE | Noted: 2019-06-13

## 2019-06-13 PROBLEM — F84.0 AUTISM SPECTRUM: Chronic | Status: ACTIVE | Noted: 2019-06-13

## 2019-06-13 PROBLEM — F41.1 GAD (GENERALIZED ANXIETY DISORDER): Chronic | Status: ACTIVE | Noted: 2019-06-13

## 2019-06-13 PROBLEM — S06.9X9A TBI (TRAUMATIC BRAIN INJURY) (HCC): Chronic | Status: ACTIVE | Noted: 2019-06-13

## 2019-06-13 PROBLEM — F33.2 SEVERE EPISODE OF RECURRENT MAJOR DEPRESSIVE DISORDER, WITHOUT PSYCHOTIC FEATURES (HCC): Chronic | Status: ACTIVE | Noted: 2019-06-13

## 2019-06-13 LAB
ATRIAL RATE: 98 BPM
P AXIS: 18 DEGREES
PR INTERVAL: 148 MS
QRS AXIS: 17 DEGREES
QRSD INTERVAL: 68 MS
QT INTERVAL: 308 MS
QTC INTERVAL: 393 MS
T WAVE AXIS: 28 DEGREES
T3FREE SERPL-MCNC: 2.44 PG/ML (ref 2.3–4.2)
T4 FREE SERPL-MCNC: 0.68 NG/DL (ref 0.76–1.46)
VALPROATE SERPL-MCNC: 47.2 UG/ML (ref 50–125)
VENTRICULAR RATE: 98 BPM

## 2019-06-13 PROCEDURE — 80061 LIPID PANEL: CPT | Performed by: PSYCHIATRY & NEUROLOGY

## 2019-06-13 PROCEDURE — 93010 ELECTROCARDIOGRAM REPORT: CPT | Performed by: INTERNAL MEDICINE

## 2019-06-13 PROCEDURE — 99222 1ST HOSP IP/OBS MODERATE 55: CPT | Performed by: PSYCHIATRY & NEUROLOGY

## 2019-06-13 PROCEDURE — 80164 ASSAY DIPROPYLACETIC ACD TOT: CPT | Performed by: PSYCHIATRY & NEUROLOGY

## 2019-06-13 PROCEDURE — 86592 SYPHILIS TEST NON-TREP QUAL: CPT | Performed by: PSYCHIATRY & NEUROLOGY

## 2019-06-13 PROCEDURE — 80053 COMPREHEN METABOLIC PANEL: CPT | Performed by: PSYCHIATRY & NEUROLOGY

## 2019-06-13 PROCEDURE — 83036 HEMOGLOBIN GLYCOSYLATED A1C: CPT | Performed by: PSYCHIATRY & NEUROLOGY

## 2019-06-13 PROCEDURE — 93005 ELECTROCARDIOGRAM TRACING: CPT

## 2019-06-13 RX ORDER — LAMOTRIGINE 100 MG/1
100 TABLET ORAL 2 TIMES DAILY
Status: DISCONTINUED | OUTPATIENT
Start: 2019-06-13 | End: 2019-06-13

## 2019-06-13 RX ORDER — LAMOTRIGINE 25 MG/1
50 TABLET ORAL 2 TIMES DAILY
Status: DISCONTINUED | OUTPATIENT
Start: 2019-06-13 | End: 2019-06-14

## 2019-06-13 RX ADMIN — ACETAMINOPHEN 650 MG: 325 TABLET ORAL at 06:05

## 2019-06-13 RX ADMIN — LORAZEPAM 1 MG: 1 TABLET ORAL at 21:52

## 2019-06-13 RX ADMIN — QUETIAPINE FUMARATE 300 MG: 100 TABLET ORAL at 08:38

## 2019-06-13 RX ADMIN — LORAZEPAM 1 MG: 1 TABLET ORAL at 16:30

## 2019-06-13 RX ADMIN — LAMOTRIGINE 50 MG: 25 TABLET ORAL at 12:48

## 2019-06-13 RX ADMIN — QUETIAPINE FUMARATE 300 MG: 100 TABLET ORAL at 17:46

## 2019-06-13 RX ADMIN — VENLAFAXINE HYDROCHLORIDE 150 MG: 150 CAPSULE, EXTENDED RELEASE ORAL at 08:38

## 2019-06-13 RX ADMIN — LAMOTRIGINE 50 MG: 25 TABLET ORAL at 17:46

## 2019-06-13 RX ADMIN — DIVALPROEX SODIUM 750 MG: 500 TABLET, FILM COATED, EXTENDED RELEASE ORAL at 21:52

## 2019-06-13 RX ADMIN — LORAZEPAM 1 MG: 1 TABLET ORAL at 08:38

## 2019-06-13 RX ADMIN — ACETAMINOPHEN 975 MG: 325 TABLET ORAL at 12:30

## 2019-06-14 LAB
ALBUMIN SERPL BCP-MCNC: 3.9 G/DL (ref 3.5–5.7)
ALP SERPL-CCNC: 34 U/L (ref 40–150)
ALT SERPL W P-5'-P-CCNC: 27 U/L (ref 7–52)
ANION GAP SERPL CALCULATED.3IONS-SCNC: 8 MMOL/L (ref 4–13)
AST SERPL W P-5'-P-CCNC: 29 U/L (ref 13–39)
BILIRUB SERPL-MCNC: 0.3 MG/DL (ref 0.2–1)
BUN SERPL-MCNC: 15 MG/DL (ref 7–25)
CALCIUM SERPL-MCNC: 9.6 MG/DL (ref 8.6–10.5)
CHLORIDE SERPL-SCNC: 100 MMOL/L (ref 98–107)
CHOLEST SERPL-MCNC: 185 MG/DL (ref 0–200)
CO2 SERPL-SCNC: 30 MMOL/L (ref 21–31)
CREAT SERPL-MCNC: 0.96 MG/DL (ref 0.6–1.2)
EST. AVERAGE GLUCOSE BLD GHB EST-MCNC: 105 MG/DL
GFR SERPL CREATININE-BSD FRML MDRD: 82 ML/MIN/1.73SQ M
GLUCOSE SERPL-MCNC: 103 MG/DL (ref 65–99)
HBA1C MFR BLD: 5.3 % (ref 4.2–6.3)
HDLC SERPL-MCNC: 44 MG/DL (ref 40–60)
LDLC SERPL CALC-MCNC: 109 MG/DL (ref 0–100)
NONHDLC SERPL-MCNC: 141 MG/DL
POTASSIUM SERPL-SCNC: 4.8 MMOL/L (ref 3.5–5.5)
PROT SERPL-MCNC: 6.5 G/DL (ref 6.4–8.9)
RPR SER QL: NORMAL
SODIUM SERPL-SCNC: 138 MMOL/L (ref 134–143)
TRIGL SERPL-MCNC: 160 MG/DL (ref 44–166)

## 2019-06-14 PROCEDURE — 99232 SBSQ HOSP IP/OBS MODERATE 35: CPT | Performed by: NURSE PRACTITIONER

## 2019-06-14 RX ORDER — LAMOTRIGINE 100 MG/1
100 TABLET ORAL 2 TIMES DAILY
Status: DISCONTINUED | OUTPATIENT
Start: 2019-06-14 | End: 2019-06-26 | Stop reason: HOSPADM

## 2019-06-14 RX ADMIN — DIVALPROEX SODIUM 750 MG: 500 TABLET, FILM COATED, EXTENDED RELEASE ORAL at 21:44

## 2019-06-14 RX ADMIN — VENLAFAXINE HYDROCHLORIDE 150 MG: 150 CAPSULE, EXTENDED RELEASE ORAL at 09:00

## 2019-06-14 RX ADMIN — QUETIAPINE FUMARATE 300 MG: 100 TABLET ORAL at 17:03

## 2019-06-14 RX ADMIN — LORAZEPAM 1 MG: 1 TABLET ORAL at 21:44

## 2019-06-14 RX ADMIN — LAMOTRIGINE 100 MG: 100 TABLET ORAL at 17:03

## 2019-06-14 RX ADMIN — QUETIAPINE FUMARATE 300 MG: 100 TABLET ORAL at 09:00

## 2019-06-14 RX ADMIN — LORAZEPAM 1 MG: 1 TABLET ORAL at 17:00

## 2019-06-14 RX ADMIN — LORAZEPAM 1 MG: 1 TABLET ORAL at 09:00

## 2019-06-14 RX ADMIN — LAMOTRIGINE 50 MG: 25 TABLET ORAL at 09:00

## 2019-06-15 LAB — VALPROATE SERPL-MCNC: 44.7 UG/ML (ref 50–125)

## 2019-06-15 PROCEDURE — 99231 SBSQ HOSP IP/OBS SF/LOW 25: CPT | Performed by: NURSE PRACTITIONER

## 2019-06-15 PROCEDURE — 80164 ASSAY DIPROPYLACETIC ACD TOT: CPT | Performed by: NURSE PRACTITIONER

## 2019-06-15 RX ADMIN — LAMOTRIGINE 100 MG: 100 TABLET ORAL at 17:24

## 2019-06-15 RX ADMIN — LORAZEPAM 1 MG: 1 TABLET ORAL at 21:51

## 2019-06-15 RX ADMIN — LORAZEPAM 1 MG: 1 TABLET ORAL at 08:56

## 2019-06-15 RX ADMIN — QUETIAPINE FUMARATE 300 MG: 100 TABLET ORAL at 17:24

## 2019-06-15 RX ADMIN — VENLAFAXINE HYDROCHLORIDE 150 MG: 150 CAPSULE, EXTENDED RELEASE ORAL at 08:57

## 2019-06-15 RX ADMIN — LORAZEPAM 1 MG: 1 TABLET ORAL at 17:00

## 2019-06-15 RX ADMIN — ACETAMINOPHEN 650 MG: 325 TABLET ORAL at 09:02

## 2019-06-15 RX ADMIN — QUETIAPINE FUMARATE 300 MG: 100 TABLET ORAL at 08:56

## 2019-06-15 RX ADMIN — LAMOTRIGINE 100 MG: 100 TABLET ORAL at 08:56

## 2019-06-15 RX ADMIN — DIVALPROEX SODIUM 750 MG: 500 TABLET, FILM COATED, EXTENDED RELEASE ORAL at 21:51

## 2019-06-16 PROCEDURE — 99231 SBSQ HOSP IP/OBS SF/LOW 25: CPT | Performed by: NURSE PRACTITIONER

## 2019-06-16 RX ADMIN — LORAZEPAM 1 MG: 1 TABLET ORAL at 08:59

## 2019-06-16 RX ADMIN — LORAZEPAM 1 MG: 1 TABLET ORAL at 21:45

## 2019-06-16 RX ADMIN — QUETIAPINE FUMARATE 300 MG: 100 TABLET ORAL at 08:59

## 2019-06-16 RX ADMIN — ACETAMINOPHEN 650 MG: 325 TABLET ORAL at 17:58

## 2019-06-16 RX ADMIN — VENLAFAXINE HYDROCHLORIDE 150 MG: 150 CAPSULE, EXTENDED RELEASE ORAL at 08:59

## 2019-06-16 RX ADMIN — DIVALPROEX SODIUM 750 MG: 500 TABLET, FILM COATED, EXTENDED RELEASE ORAL at 21:45

## 2019-06-16 RX ADMIN — LAMOTRIGINE 100 MG: 100 TABLET ORAL at 17:57

## 2019-06-16 RX ADMIN — LORAZEPAM 1 MG: 1 TABLET ORAL at 17:00

## 2019-06-16 RX ADMIN — LAMOTRIGINE 100 MG: 100 TABLET ORAL at 08:59

## 2019-06-16 RX ADMIN — QUETIAPINE FUMARATE 300 MG: 100 TABLET ORAL at 17:57

## 2019-06-17 PROCEDURE — 99232 SBSQ HOSP IP/OBS MODERATE 35: CPT | Performed by: PSYCHIATRY & NEUROLOGY

## 2019-06-17 RX ORDER — DIVALPROEX SODIUM 500 MG/1
1000 TABLET, EXTENDED RELEASE ORAL
Status: DISCONTINUED | OUTPATIENT
Start: 2019-06-17 | End: 2019-06-26 | Stop reason: HOSPADM

## 2019-06-17 RX ADMIN — ACETAMINOPHEN 650 MG: 325 TABLET ORAL at 12:54

## 2019-06-17 RX ADMIN — LORAZEPAM 1 MG: 1 TABLET ORAL at 09:23

## 2019-06-17 RX ADMIN — LAMOTRIGINE 100 MG: 100 TABLET ORAL at 17:38

## 2019-06-17 RX ADMIN — QUETIAPINE FUMARATE 300 MG: 100 TABLET ORAL at 09:23

## 2019-06-17 RX ADMIN — VENLAFAXINE HYDROCHLORIDE 150 MG: 150 CAPSULE, EXTENDED RELEASE ORAL at 09:23

## 2019-06-17 RX ADMIN — QUETIAPINE FUMARATE 300 MG: 100 TABLET ORAL at 17:38

## 2019-06-17 RX ADMIN — LAMOTRIGINE 100 MG: 100 TABLET ORAL at 09:23

## 2019-06-17 RX ADMIN — LORAZEPAM 1 MG: 1 TABLET ORAL at 16:50

## 2019-06-17 RX ADMIN — DIVALPROEX SODIUM 1000 MG: 500 TABLET, EXTENDED RELEASE ORAL at 21:44

## 2019-06-17 RX ADMIN — LORAZEPAM 1 MG: 1 TABLET ORAL at 21:44

## 2019-06-18 PROCEDURE — 80175 DRUG SCREEN QUAN LAMOTRIGINE: CPT | Performed by: PSYCHIATRY & NEUROLOGY

## 2019-06-18 PROCEDURE — 99231 SBSQ HOSP IP/OBS SF/LOW 25: CPT | Performed by: PSYCHIATRY & NEUROLOGY

## 2019-06-18 RX ADMIN — LORAZEPAM 1 MG: 1 TABLET ORAL at 21:53

## 2019-06-18 RX ADMIN — LORAZEPAM 1 MG: 1 TABLET ORAL at 17:18

## 2019-06-18 RX ADMIN — QUETIAPINE FUMARATE 300 MG: 100 TABLET ORAL at 17:18

## 2019-06-18 RX ADMIN — LORAZEPAM 1 MG: 1 TABLET ORAL at 08:55

## 2019-06-18 RX ADMIN — LAMOTRIGINE 100 MG: 100 TABLET ORAL at 08:55

## 2019-06-18 RX ADMIN — LAMOTRIGINE 100 MG: 100 TABLET ORAL at 17:18

## 2019-06-18 RX ADMIN — VENLAFAXINE HYDROCHLORIDE 150 MG: 150 CAPSULE, EXTENDED RELEASE ORAL at 08:55

## 2019-06-18 RX ADMIN — QUETIAPINE FUMARATE 300 MG: 100 TABLET ORAL at 08:55

## 2019-06-18 RX ADMIN — DIVALPROEX SODIUM 1000 MG: 500 TABLET, EXTENDED RELEASE ORAL at 21:53

## 2019-06-19 PROCEDURE — 99231 SBSQ HOSP IP/OBS SF/LOW 25: CPT | Performed by: NURSE PRACTITIONER

## 2019-06-19 RX ADMIN — LORAZEPAM 1 MG: 1 TABLET ORAL at 21:37

## 2019-06-19 RX ADMIN — VENLAFAXINE HYDROCHLORIDE 150 MG: 150 CAPSULE, EXTENDED RELEASE ORAL at 08:47

## 2019-06-19 RX ADMIN — DIVALPROEX SODIUM 1000 MG: 500 TABLET, EXTENDED RELEASE ORAL at 21:37

## 2019-06-19 RX ADMIN — LAMOTRIGINE 100 MG: 100 TABLET ORAL at 08:47

## 2019-06-19 RX ADMIN — LORAZEPAM 1 MG: 1 TABLET ORAL at 08:47

## 2019-06-19 RX ADMIN — LAMOTRIGINE 100 MG: 100 TABLET ORAL at 17:24

## 2019-06-19 RX ADMIN — QUETIAPINE FUMARATE 300 MG: 100 TABLET ORAL at 08:47

## 2019-06-19 RX ADMIN — QUETIAPINE FUMARATE 300 MG: 100 TABLET ORAL at 17:24

## 2019-06-19 RX ADMIN — LORAZEPAM 1 MG: 1 TABLET ORAL at 17:24

## 2019-06-20 LAB — LAMOTRIGINE SERPL-MCNC: 8.9 UG/ML (ref 2–20)

## 2019-06-20 PROCEDURE — 99232 SBSQ HOSP IP/OBS MODERATE 35: CPT | Performed by: PSYCHIATRY & NEUROLOGY

## 2019-06-20 RX ADMIN — QUETIAPINE FUMARATE 300 MG: 100 TABLET ORAL at 08:47

## 2019-06-20 RX ADMIN — QUETIAPINE FUMARATE 300 MG: 100 TABLET ORAL at 17:52

## 2019-06-20 RX ADMIN — LORAZEPAM 1 MG: 1 TABLET ORAL at 21:00

## 2019-06-20 RX ADMIN — LORAZEPAM 1 MG: 1 TABLET ORAL at 08:47

## 2019-06-20 RX ADMIN — DIVALPROEX SODIUM 1000 MG: 500 TABLET, EXTENDED RELEASE ORAL at 21:00

## 2019-06-20 RX ADMIN — LORAZEPAM 1 MG: 1 TABLET ORAL at 16:45

## 2019-06-20 RX ADMIN — VENLAFAXINE HYDROCHLORIDE 150 MG: 150 CAPSULE, EXTENDED RELEASE ORAL at 08:47

## 2019-06-20 RX ADMIN — LAMOTRIGINE 100 MG: 100 TABLET ORAL at 17:52

## 2019-06-20 RX ADMIN — LAMOTRIGINE 100 MG: 100 TABLET ORAL at 08:47

## 2019-06-21 LAB — VALPROATE SERPL-MCNC: 58.8 UG/ML (ref 50–125)

## 2019-06-21 PROCEDURE — 99232 SBSQ HOSP IP/OBS MODERATE 35: CPT | Performed by: PSYCHIATRY & NEUROLOGY

## 2019-06-21 PROCEDURE — 80164 ASSAY DIPROPYLACETIC ACD TOT: CPT | Performed by: NURSE PRACTITIONER

## 2019-06-21 RX ADMIN — LORAZEPAM 1 MG: 1 TABLET ORAL at 21:30

## 2019-06-21 RX ADMIN — LORAZEPAM 1 MG: 1 TABLET ORAL at 17:23

## 2019-06-21 RX ADMIN — DIVALPROEX SODIUM 1000 MG: 500 TABLET, EXTENDED RELEASE ORAL at 21:30

## 2019-06-21 RX ADMIN — VENLAFAXINE HYDROCHLORIDE 150 MG: 150 CAPSULE, EXTENDED RELEASE ORAL at 08:55

## 2019-06-21 RX ADMIN — LAMOTRIGINE 100 MG: 100 TABLET ORAL at 08:55

## 2019-06-21 RX ADMIN — QUETIAPINE FUMARATE 300 MG: 100 TABLET ORAL at 17:23

## 2019-06-21 RX ADMIN — QUETIAPINE FUMARATE 300 MG: 100 TABLET ORAL at 08:55

## 2019-06-21 RX ADMIN — LORAZEPAM 1 MG: 1 TABLET ORAL at 08:55

## 2019-06-21 RX ADMIN — LAMOTRIGINE 100 MG: 100 TABLET ORAL at 17:23

## 2019-06-22 PROCEDURE — 99231 SBSQ HOSP IP/OBS SF/LOW 25: CPT | Performed by: PSYCHIATRY & NEUROLOGY

## 2019-06-22 RX ADMIN — LORAZEPAM 1 MG: 1 TABLET ORAL at 08:46

## 2019-06-22 RX ADMIN — LAMOTRIGINE 100 MG: 100 TABLET ORAL at 17:27

## 2019-06-22 RX ADMIN — QUETIAPINE FUMARATE 300 MG: 100 TABLET ORAL at 17:27

## 2019-06-22 RX ADMIN — LAMOTRIGINE 100 MG: 100 TABLET ORAL at 08:46

## 2019-06-22 RX ADMIN — QUETIAPINE FUMARATE 300 MG: 100 TABLET ORAL at 08:46

## 2019-06-22 RX ADMIN — LORAZEPAM 1 MG: 1 TABLET ORAL at 21:08

## 2019-06-22 RX ADMIN — DIVALPROEX SODIUM 1000 MG: 500 TABLET, EXTENDED RELEASE ORAL at 21:08

## 2019-06-22 RX ADMIN — VENLAFAXINE HYDROCHLORIDE 150 MG: 150 CAPSULE, EXTENDED RELEASE ORAL at 08:46

## 2019-06-22 RX ADMIN — LORAZEPAM 1 MG: 1 TABLET ORAL at 17:00

## 2019-06-23 PROCEDURE — 99231 SBSQ HOSP IP/OBS SF/LOW 25: CPT | Performed by: NURSE PRACTITIONER

## 2019-06-23 RX ADMIN — LORAZEPAM 1 MG: 1 TABLET ORAL at 21:28

## 2019-06-23 RX ADMIN — DIVALPROEX SODIUM 1000 MG: 500 TABLET, EXTENDED RELEASE ORAL at 21:28

## 2019-06-23 RX ADMIN — QUETIAPINE FUMARATE 300 MG: 100 TABLET ORAL at 08:11

## 2019-06-23 RX ADMIN — LORAZEPAM 1 MG: 1 TABLET ORAL at 17:00

## 2019-06-23 RX ADMIN — VENLAFAXINE HYDROCHLORIDE 150 MG: 150 CAPSULE, EXTENDED RELEASE ORAL at 08:12

## 2019-06-23 RX ADMIN — LORAZEPAM 1 MG: 1 TABLET ORAL at 08:11

## 2019-06-23 RX ADMIN — QUETIAPINE FUMARATE 300 MG: 100 TABLET ORAL at 17:34

## 2019-06-23 RX ADMIN — LAMOTRIGINE 100 MG: 100 TABLET ORAL at 17:34

## 2019-06-23 RX ADMIN — LAMOTRIGINE 100 MG: 100 TABLET ORAL at 08:12

## 2019-06-24 PROCEDURE — 99231 SBSQ HOSP IP/OBS SF/LOW 25: CPT | Performed by: PSYCHIATRY & NEUROLOGY

## 2019-06-24 RX ADMIN — LORAZEPAM 1 MG: 1 TABLET ORAL at 21:26

## 2019-06-24 RX ADMIN — QUETIAPINE FUMARATE 300 MG: 100 TABLET ORAL at 17:39

## 2019-06-24 RX ADMIN — ACETAMINOPHEN 975 MG: 325 TABLET ORAL at 18:30

## 2019-06-24 RX ADMIN — QUETIAPINE FUMARATE 300 MG: 100 TABLET ORAL at 09:04

## 2019-06-24 RX ADMIN — VENLAFAXINE HYDROCHLORIDE 150 MG: 150 CAPSULE, EXTENDED RELEASE ORAL at 09:04

## 2019-06-24 RX ADMIN — LORAZEPAM 1 MG: 1 TABLET ORAL at 17:39

## 2019-06-24 RX ADMIN — LORAZEPAM 1 MG: 1 TABLET ORAL at 09:04

## 2019-06-24 RX ADMIN — DIVALPROEX SODIUM 1000 MG: 500 TABLET, EXTENDED RELEASE ORAL at 21:26

## 2019-06-24 RX ADMIN — LAMOTRIGINE 100 MG: 100 TABLET ORAL at 17:39

## 2019-06-24 RX ADMIN — LAMOTRIGINE 100 MG: 100 TABLET ORAL at 09:04

## 2019-06-25 PROCEDURE — 99231 SBSQ HOSP IP/OBS SF/LOW 25: CPT | Performed by: PSYCHIATRY & NEUROLOGY

## 2019-06-25 RX ADMIN — QUETIAPINE FUMARATE 300 MG: 100 TABLET ORAL at 08:14

## 2019-06-25 RX ADMIN — DIVALPROEX SODIUM 1000 MG: 500 TABLET, EXTENDED RELEASE ORAL at 21:37

## 2019-06-25 RX ADMIN — LAMOTRIGINE 100 MG: 100 TABLET ORAL at 17:56

## 2019-06-25 RX ADMIN — QUETIAPINE FUMARATE 300 MG: 100 TABLET ORAL at 17:56

## 2019-06-25 RX ADMIN — LORAZEPAM 1 MG: 1 TABLET ORAL at 16:33

## 2019-06-25 RX ADMIN — LAMOTRIGINE 100 MG: 100 TABLET ORAL at 08:14

## 2019-06-25 RX ADMIN — LORAZEPAM 1 MG: 1 TABLET ORAL at 08:14

## 2019-06-25 RX ADMIN — LORAZEPAM 1 MG: 1 TABLET ORAL at 21:37

## 2019-06-25 RX ADMIN — VENLAFAXINE HYDROCHLORIDE 150 MG: 150 CAPSULE, EXTENDED RELEASE ORAL at 08:14

## 2019-06-26 VITALS
OXYGEN SATURATION: 98 % | WEIGHT: 166.5 LBS | BODY MASS INDEX: 32.69 KG/M2 | TEMPERATURE: 97.5 F | HEART RATE: 78 BPM | SYSTOLIC BLOOD PRESSURE: 97 MMHG | RESPIRATION RATE: 16 BRPM | HEIGHT: 60 IN | DIASTOLIC BLOOD PRESSURE: 56 MMHG

## 2019-06-26 PROCEDURE — 99238 HOSP IP/OBS DSCHRG MGMT 30/<: CPT | Performed by: PSYCHIATRY & NEUROLOGY

## 2019-06-26 RX ORDER — QUETIAPINE FUMARATE 300 MG/1
300 TABLET, FILM COATED ORAL 2 TIMES DAILY
Qty: 60 TABLET | Refills: 0 | Status: SHIPPED | OUTPATIENT
Start: 2019-06-26 | End: 2019-07-17 | Stop reason: HOSPADM

## 2019-06-26 RX ORDER — DIVALPROEX SODIUM 500 MG/1
1000 TABLET, EXTENDED RELEASE ORAL
Qty: 60 TABLET | Refills: 0 | Status: SHIPPED | OUTPATIENT
Start: 2019-06-26 | End: 2019-09-04 | Stop reason: HOSPADM

## 2019-06-26 RX ADMIN — LORAZEPAM 1 MG: 1 TABLET ORAL at 09:11

## 2019-06-26 RX ADMIN — VENLAFAXINE HYDROCHLORIDE 150 MG: 150 CAPSULE, EXTENDED RELEASE ORAL at 09:11

## 2019-06-26 RX ADMIN — LAMOTRIGINE 100 MG: 100 TABLET ORAL at 09:11

## 2019-06-26 RX ADMIN — QUETIAPINE FUMARATE 300 MG: 100 TABLET ORAL at 09:11

## 2019-07-11 ENCOUNTER — HOSPITAL ENCOUNTER (INPATIENT)
Facility: HOSPITAL | Age: 24
LOS: 6 days | Discharge: HOME | DRG: 885 | End: 2019-07-17
Attending: EMERGENCY MEDICINE | Admitting: PSYCHIATRY & NEUROLOGY
Payer: MEDICARE

## 2019-07-11 DIAGNOSIS — F31.4 BIPOLAR 1 DISORDER, DEPRESSED, SEVERE (HCC): Primary | ICD-10-CM

## 2019-07-11 DIAGNOSIS — F41.9 ANXIETY: ICD-10-CM

## 2019-07-11 DIAGNOSIS — F32.A DEPRESSION: ICD-10-CM

## 2019-07-11 DIAGNOSIS — Z00.00 PHYSICAL EXAM: ICD-10-CM

## 2019-07-11 LAB
AMPHETAMINES SERPL QL SCN: NEGATIVE
BARBITURATES UR QL: NEGATIVE
BENZODIAZ UR QL: NEGATIVE
COCAINE UR QL: NEGATIVE
ETHANOL SERPL-MCNC: <10 MG/DL
EXT PREG TEST URINE: NORMAL
EXT. CONTROL ED NAV: NORMAL
METHADONE UR QL: NEGATIVE
OPIATES UR QL SCN: NEGATIVE
PCP UR QL: NEGATIVE
THC UR QL: NEGATIVE

## 2019-07-11 PROCEDURE — 80307 DRUG TEST PRSMV CHEM ANLYZR: CPT | Performed by: PHYSICIAN ASSISTANT

## 2019-07-11 PROCEDURE — 36415 COLL VENOUS BLD VENIPUNCTURE: CPT | Performed by: PHYSICIAN ASSISTANT

## 2019-07-11 PROCEDURE — 99285 EMERGENCY DEPT VISIT HI MDM: CPT

## 2019-07-11 PROCEDURE — 81025 URINE PREGNANCY TEST: CPT | Performed by: PHYSICIAN ASSISTANT

## 2019-07-11 PROCEDURE — 80320 DRUG SCREEN QUANTALCOHOLS: CPT | Performed by: PHYSICIAN ASSISTANT

## 2019-07-11 RX ORDER — TRAZODONE HYDROCHLORIDE 50 MG/1
50 TABLET ORAL
Status: DISCONTINUED | OUTPATIENT
Start: 2019-07-11 | End: 2019-07-17 | Stop reason: HOSPADM

## 2019-07-11 RX ORDER — ACETAMINOPHEN 325 MG/1
650 TABLET ORAL EVERY 6 HOURS PRN
Status: DISCONTINUED | OUTPATIENT
Start: 2019-07-11 | End: 2019-07-17 | Stop reason: HOSPADM

## 2019-07-11 RX ORDER — ACETAMINOPHEN 325 MG/1
975 TABLET ORAL EVERY 6 HOURS PRN
Status: DISCONTINUED | OUTPATIENT
Start: 2019-07-11 | End: 2019-07-17 | Stop reason: HOSPADM

## 2019-07-11 RX ORDER — VENLAFAXINE HYDROCHLORIDE 150 MG/1
150 CAPSULE, EXTENDED RELEASE ORAL DAILY
Status: DISCONTINUED | OUTPATIENT
Start: 2019-07-12 | End: 2019-07-15

## 2019-07-11 RX ORDER — DOCUSATE SODIUM 100 MG/1
100 CAPSULE, LIQUID FILLED ORAL 2 TIMES DAILY
Status: DISCONTINUED | OUTPATIENT
Start: 2019-07-11 | End: 2019-07-17 | Stop reason: HOSPADM

## 2019-07-11 RX ORDER — HALOPERIDOL 5 MG/ML
5 INJECTION INTRAMUSCULAR EVERY 6 HOURS PRN
Status: DISCONTINUED | OUTPATIENT
Start: 2019-07-11 | End: 2019-07-17 | Stop reason: HOSPADM

## 2019-07-11 RX ORDER — MAGNESIUM HYDROXIDE/ALUMINUM HYDROXICE/SIMETHICONE 120; 1200; 1200 MG/30ML; MG/30ML; MG/30ML
30 SUSPENSION ORAL EVERY 4 HOURS PRN
Status: DISCONTINUED | OUTPATIENT
Start: 2019-07-11 | End: 2019-07-17 | Stop reason: HOSPADM

## 2019-07-11 RX ORDER — VENLAFAXINE HYDROCHLORIDE 150 MG/1
150 CAPSULE, EXTENDED RELEASE ORAL DAILY
Status: DISCONTINUED | OUTPATIENT
Start: 2019-07-12 | End: 2019-07-11

## 2019-07-11 RX ORDER — HYDROXYZINE HYDROCHLORIDE 25 MG/1
50 TABLET, FILM COATED ORAL EVERY 4 HOURS PRN
Status: DISCONTINUED | OUTPATIENT
Start: 2019-07-11 | End: 2019-07-17 | Stop reason: HOSPADM

## 2019-07-11 RX ORDER — OLANZAPINE 10 MG/1
10 INJECTION, POWDER, LYOPHILIZED, FOR SOLUTION INTRAMUSCULAR
Status: DISCONTINUED | OUTPATIENT
Start: 2019-07-11 | End: 2019-07-17 | Stop reason: HOSPADM

## 2019-07-11 RX ORDER — LORAZEPAM 1 MG/1
1 TABLET ORAL 3 TIMES DAILY
Status: DISCONTINUED | OUTPATIENT
Start: 2019-07-11 | End: 2019-07-17 | Stop reason: HOSPADM

## 2019-07-11 RX ORDER — RISPERIDONE 1 MG/1
1 TABLET, ORALLY DISINTEGRATING ORAL
Status: DISCONTINUED | OUTPATIENT
Start: 2019-07-11 | End: 2019-07-17 | Stop reason: HOSPADM

## 2019-07-11 RX ORDER — HALOPERIDOL 5 MG
5 TABLET ORAL EVERY 6 HOURS PRN
Status: DISCONTINUED | OUTPATIENT
Start: 2019-07-11 | End: 2019-07-17 | Stop reason: HOSPADM

## 2019-07-11 RX ORDER — DIVALPROEX SODIUM 250 MG/1
1000 TABLET, EXTENDED RELEASE ORAL EVERY EVENING
Status: DISCONTINUED | OUTPATIENT
Start: 2019-07-11 | End: 2019-07-17 | Stop reason: HOSPADM

## 2019-07-11 RX ORDER — LORAZEPAM 2 MG/ML
2 INJECTION INTRAMUSCULAR EVERY 6 HOURS PRN
Status: DISCONTINUED | OUTPATIENT
Start: 2019-07-11 | End: 2019-07-17 | Stop reason: HOSPADM

## 2019-07-11 RX ORDER — BENZTROPINE MESYLATE 1 MG/1
1 TABLET ORAL EVERY 6 HOURS PRN
Status: DISCONTINUED | OUTPATIENT
Start: 2019-07-11 | End: 2019-07-17 | Stop reason: HOSPADM

## 2019-07-11 RX ORDER — QUETIAPINE FUMARATE 300 MG/1
300 TABLET, FILM COATED ORAL 2 TIMES DAILY
Status: DISCONTINUED | OUTPATIENT
Start: 2019-07-11 | End: 2019-07-11

## 2019-07-11 RX ORDER — ACETAMINOPHEN 325 MG/1
650 TABLET ORAL EVERY 4 HOURS PRN
Status: DISCONTINUED | OUTPATIENT
Start: 2019-07-11 | End: 2019-07-17 | Stop reason: HOSPADM

## 2019-07-11 RX ORDER — HYDROXYZINE HYDROCHLORIDE 25 MG/1
25 TABLET, FILM COATED ORAL EVERY 6 HOURS PRN
Status: DISCONTINUED | OUTPATIENT
Start: 2019-07-11 | End: 2019-07-17 | Stop reason: HOSPADM

## 2019-07-11 RX ORDER — BENZTROPINE MESYLATE 1 MG/ML
1 INJECTION INTRAMUSCULAR; INTRAVENOUS EVERY 6 HOURS PRN
Status: DISCONTINUED | OUTPATIENT
Start: 2019-07-11 | End: 2019-07-17 | Stop reason: HOSPADM

## 2019-07-11 RX ORDER — POLYETHYLENE GLYCOL 3350 17 G/17G
17 POWDER, FOR SOLUTION ORAL DAILY
Status: DISCONTINUED | OUTPATIENT
Start: 2019-07-12 | End: 2019-07-17 | Stop reason: HOSPADM

## 2019-07-11 RX ORDER — LAMOTRIGINE 100 MG/1
100 TABLET ORAL 2 TIMES DAILY
Status: DISCONTINUED | OUTPATIENT
Start: 2019-07-11 | End: 2019-07-17 | Stop reason: HOSPADM

## 2019-07-11 RX ORDER — QUETIAPINE FUMARATE 200 MG/1
400 TABLET, FILM COATED ORAL
Status: DISCONTINUED | OUTPATIENT
Start: 2019-07-11 | End: 2019-07-17 | Stop reason: HOSPADM

## 2019-07-11 RX ADMIN — Medication 1 TABLET: at 17:40

## 2019-07-11 RX ADMIN — QUETIAPINE 400 MG: 200 TABLET, FILM COATED ORAL at 21:45

## 2019-07-11 RX ADMIN — DIVALPROEX SODIUM 1000 MG: 250 TABLET, EXTENDED RELEASE ORAL at 17:40

## 2019-07-11 RX ADMIN — LORAZEPAM 1 MG: 1 TABLET ORAL at 21:45

## 2019-07-11 RX ADMIN — LAMOTRIGINE 100 MG: 100 TABLET ORAL at 17:40

## 2019-07-11 RX ADMIN — DOCUSATE SODIUM 100 MG: 100 CAPSULE, LIQUID FILLED ORAL at 17:40

## 2019-07-11 RX ADMIN — LORAZEPAM 1 MG: 1 TABLET ORAL at 17:40

## 2019-07-11 RX ADMIN — ACETAMINOPHEN 650 MG: 325 TABLET ORAL at 17:41

## 2019-07-11 NOTE — ED NOTES
Patient is accepted at [facility]  Patient is accepted by Dr Kandy Cole per Jose Lopes,       Patient may go to the floor once RN report is received  Unit RN to call ED for nurse report prior to patient transfer
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
## 2019-07-11 NOTE — ED NOTES
Patient states that she is anxious and depressed  Patient had an argument with her mother this morning because she was refusing to take her medication  Patient denies present BASIL/VIRGIE Gonzalez, PRIYANKA  07/11/19 7645
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
## 2019-07-11 NOTE — ED NOTES
[facility] at intake for review

## 2019-07-11 NOTE — ED PROVIDER NOTES
History  Chief Complaint   Patient presents with    Psychiatric Evaluation     Patient states that she got into argument with her mother at home today because she refused to take her meds  Patient states that she went to take her meds because she felt like they were not going to help her  Patient states she called EMS because she would like to sign herself into the hospital as she cannot stand her life anymore, does not want to live with her mom anymore and has nowhere else to go  Patient denies homicidal ideation or specific plans to hurt herself  History provided by:  Patient  Psychiatric Evaluation   Presenting symptoms: depression    Onset quality:  Gradual  Duration:  1 day  Progression:  Worsening  Chronicity:  Recurrent  Treatment compliance:  Most of the time  Time since last psychoactive medication taken:  1 day  Relieved by:  Nothing  Worsened by:  Family interactions  Ineffective treatments:  Antipsychotics and anti-anxiety medications  Associated symptoms: anxiety    Associated symptoms: no abdominal pain, no appetite change, no chest pain, no decreased need for sleep and no headaches    Risk factors: hx of mental illness and recent psychiatric admission        Prior to Admission Medications   Prescriptions Last Dose Informant Patient Reported? Taking?    Carboxymethylcellulose Sod PF (REFRESH CELLUVISC) 1 % GEL   Yes No   Sig: Apply to eye   Cholecalciferol (VITAMIN D3) 37673 units TABS   Yes No   Sig: Take by mouth   LORazepam (ATIVAN) 1 mg tablet   Yes Yes   Sig: Take 1 mg by mouth 3 (three) times a day   Multiple Vitamin (MULTIVITAMIN) capsule   Yes Yes   Sig: Take 1 capsule by mouth daily   QUEtiapine (SEROquel) 300 mg tablet   No Yes   Sig: Take 1 tablet (300 mg total) by mouth 2 (two) times a day for 30 days   acetaminophen (TYLENOL) 325 mg tablet   Yes Yes   Sig: Take 650 mg by mouth every 6 (six) hours as needed for mild pain   divalproex sodium (DEPAKOTE ER) 500 mg 24 hr tablet   No Yes Sig: Take 2 tablets (1,000 mg total) by mouth daily at bedtime for 30 days   docusate sodium (COLACE) 100 mg capsule   Yes Yes   Sig: Take 100 mg by mouth 2 (two) times a day   fexofenadine (ALLEGRA) 180 MG tablet   Yes Yes   Sig: Take 180 mg by mouth daily   fluticasone (FLOVENT DISKUS) 50 MCG/BLIST diskus inhaler   Yes Yes   Sig: Inhale 1 puff 2 (two) times a day   lamoTRIgine (LaMICtal) 100 mg tablet   Yes Yes   Sig: Take 100 mg by mouth 2 (two) times a day   norethindrone-ethinyl estradiol-iron (MICROGESTIN FE1 5/30) 1 5-30 MG-MCG tablet   Yes No   Sig: Take 1 tablet by mouth daily   polyethylene glycol (MIRALAX) 17 g packet   Yes Yes   Sig: Take 17 g by mouth daily   pseudoephedrine (SUDAFED) 60 mg tablet   Yes Yes   Sig: Take 60 mg by mouth every 4 (four) hours as needed for congestion   venlafaxine (EFFEXOR-XR) 150 mg 24 hr capsule   Yes Yes   Sig: Take 150 mg by mouth daily      Facility-Administered Medications: None       Past Medical History:   Diagnosis Date    Adjustment disorder     Anxiety     Autism spectrum disorder     Depression     Fracture of skull (HCC)     Obsessive-compulsive disorder     Oppositional defiant disorder     [facility]     [facility]     Traumatic brain injury ([facility] )        History reviewed  No pertinent surgical history  Family History   Adopted: Yes   Family history unknown: Yes     I have reviewed and agree with the history as documented  Social History     Tobacco Use    Smoking status: Never Smoker    Smokeless tobacco: Never Used   Substance Use Topics    Alcohol use: No    Drug use: No        Review of Systems   Constitutional: Negative for appetite change and fever  HENT: Negative for congestion, rhinorrhea and sore throat  Eyes: Negative for visual disturbance  Respiratory: Negative for cough and shortness of breath  Cardiovascular: Negative for chest pain     Gastrointestinal: Negative for abdominal pain, diarrhea, nausea and vomiting  Genitourinary: Negative for dysuria  Skin: Negative for rash  Neurological: Negative for headaches  Psychiatric/Behavioral: The patient is nervous/anxious  Physical Exam  Physical Exam   Constitutional: She is oriented to person, place, and time  She appears well-developed and well-nourished  HENT:   Head: Normocephalic and atraumatic  Right Ear: External ear normal    Left Ear: External ear normal    Nose: Nose normal    Mouth/Throat: Oropharynx is clear and moist    Eyes: Pupils are equal, round, and reactive to light  Conjunctivae and EOM are normal    Neck: Normal range of motion  Neck supple  Cardiovascular: Normal rate, regular rhythm and normal heart sounds  Pulmonary/Chest: Effort normal and breath sounds normal    Neurological: She is alert and oriented to person, place, and time  Skin: Skin is warm and dry  Capillary refill takes less than 2 seconds  Psychiatric: She has a normal mood and affect  Her behavior is normal    Nursing note and vitals reviewed  Vital Signs  ED Triage Vitals [07/11/19 1104]   Temperature Pulse Respirations Blood Pressure SpO2   (!) 97 3 °F (36 3 °C) (!) 116 16 119/67 98 %      Temp Source Heart Rate Source Patient Position - Orthostatic VS BP Location FiO2 (%)   Temporal -- Sitting Right arm --      Pain Score       No Pain           Vitals:    07/11/19 1104   BP: 119/67   Pulse: (!) 116   Patient Position - Orthostatic VS: Sitting         Visual Acuity      ED Medications  Medications - No data to display    Diagnostic Studies  Results Reviewed     Procedure Component Value Units Date/Time    Ethanol [[id number]] Collected:  07/11/19 1115    Lab Status:   In process Specimen:  Blood from Arm, Left Updated:  07/11/19 1117    Rapid drug screen, urine [[id number]] Collected:  07/11/19 1115    Lab Status:  No result Specimen:  Urine, Clean Catch     POCT pregnancy, urine [[id number]]     Lab Status:  No result                  No orders to display              Procedures  Procedures       ED Course  ED Course as of Jul 11 1200   Thu Jul 11, 2019   1159 Patient is medically cleared for inpatient psychiatric admission at this time  MDM    Disposition  Final diagnoses:   None     ED Disposition     None      Follow-up Information    None         Patient's Medications   Discharge Prescriptions    No medications on file     No discharge procedures on file      ED Provider  Electronically Signed by           Tyson John MD  07/12/19 8821

## 2019-07-11 NOTE — ED NOTES
Insurance Authorization for admission:   No pre-cert necessary Medicare A&B primary  Phone call placed to [facility]  ([address])  Phone number: [phone]  Spoke to Sveta's  Level of care: I/P jason Bangura's requested that UR fax upon D/C     EVS (Eligibility Verification System) called - [phone]  Automated system indicates: Pt is eligible for MA, Behavioral Health - Managed Care   Rec #[id number]    Insurance Authorization for Transportation:    No transport needed

## 2019-07-12 PROBLEM — F31.4 BIPOLAR 1 DISORDER, DEPRESSED, SEVERE (HCC): Status: ACTIVE | Noted: 2019-07-12

## 2019-07-12 PROCEDURE — 99223 1ST HOSP IP/OBS HIGH 75: CPT | Performed by: PSYCHIATRY & NEUROLOGY

## 2019-07-12 RX ADMIN — LAMOTRIGINE 100 MG: 100 TABLET ORAL at 18:21

## 2019-07-12 RX ADMIN — VENLAFAXINE HYDROCHLORIDE 150 MG: 150 CAPSULE, EXTENDED RELEASE ORAL at 08:38

## 2019-07-12 RX ADMIN — DOCUSATE SODIUM 100 MG: 100 CAPSULE, LIQUID FILLED ORAL at 08:38

## 2019-07-12 RX ADMIN — LORAZEPAM 1 MG: 1 TABLET ORAL at 22:49

## 2019-07-12 RX ADMIN — QUETIAPINE 400 MG: 200 TABLET, FILM COATED ORAL at 22:49

## 2019-07-12 RX ADMIN — Medication 1 TABLET: at 08:38

## 2019-07-12 RX ADMIN — DIVALPROEX SODIUM 1000 MG: 250 TABLET, EXTENDED RELEASE ORAL at 18:22

## 2019-07-12 RX ADMIN — LORAZEPAM 1 MG: 1 TABLET ORAL at 08:38

## 2019-07-12 RX ADMIN — DOCUSATE SODIUM 100 MG: 100 CAPSULE, LIQUID FILLED ORAL at 18:22

## 2019-07-12 RX ADMIN — ACETAMINOPHEN 650 MG: 325 TABLET ORAL at 08:51

## 2019-07-12 RX ADMIN — LAMOTRIGINE 100 MG: 100 TABLET ORAL at 08:38

## 2019-07-12 RX ADMIN — LORAZEPAM 1 MG: 1 TABLET ORAL at 15:43

## 2019-07-12 NOTE — CONSULTS
Consultation - Gerda Alvarado 30 y o  female MRN: [id number]    Unit/Bed#: [facility] 245-01 Encounter: [id number]      Assessment/Plan     Assessment:  Depression  Autism spectrum disorder  History of  Traumatic brain injury    Plan: All further care per psychiatry, no acute changes in medical care at this time  History of Present Illness     HPI: Chantel Vallecillo is a 30y o  year old female who presents for inpatient psychiatric admission  Per ER reports:     Psychiatric Evaluation      Patient states that she got into argument with her mother at home today because she refused to take her meds  Patient states that she went to take her meds because she felt like they were not going to help her  Patient states she called EMS because she would like to sign herself into the hospital as she cannot stand her life anymore, does not want to live with her mom anymore and has nowhere else to go  Patient denies homicidal ideation or specific plans to hurt herself  Patient offers no acute medical complaints at this time  Inpatient consult for Medical Clearance for [facility] patient  Consult performed by: Maggi Godoy PA-C  Consult ordered by: CHRISTIANNE Villanueva          Review of Systems   Constitutional: Negative for chills, diaphoresis, fatigue and fever  HENT: Negative for congestion, ear pain, rhinorrhea, sneezing and sore throat  Respiratory: Negative for cough, shortness of breath and wheezing  Cardiovascular: Negative for chest pain, palpitations and leg swelling  Gastrointestinal: Negative for abdominal pain, constipation, diarrhea, nausea and vomiting  Genitourinary: Negative for difficulty urinating, dysuria, frequency, hematuria and urgency  Musculoskeletal: Negative for gait problem, myalgias and neck pain  Skin: Negative for rash  Neurological: Negative for dizziness, syncope, weakness, light-headedness and headaches  All other systems reviewed and are negative        Historical Information Past Medical History:   Diagnosis Date    Adjustment disorder     Anxiety     Autism spectrum disorder     Depression     Fracture of skull (HCC)     Obsessive-compulsive disorder     Oppositional defiant disorder     [facility]     [facility]achnoid hemorrhage [facility]     Traumatic brain injury ([facility] )      History reviewed  No pertinent surgical history    Social History   Social History     Substance and Sexual Activity   Alcohol Use No     Social History     Substance and Sexual Activity   Drug Use No     Social History     Tobacco Use   Smoking Status Never Smoker   Smokeless Tobacco Never Used     Family History:   Family History   Adopted: Yes   Family history unknown: Yes       Meds/Allergies   current meds:   Current Facility-Administered Medications   Medication Dose Route Frequency    acetaminophen (TYLENOL) tablet 650 mg  650 mg Oral Q6H PRN    acetaminophen (TYLENOL) tablet 650 mg  650 mg Oral Q4H PRN    acetaminophen (TYLENOL) tablet 975 mg  975 mg Oral Q6H PRN    aluminum-magnesium hydroxide-simethicone (MYLANTA) 200-200-20 mg/5 mL oral suspension 30 mL  30 mL Oral Q4H PRN    benztropine (COGENTIN) injection 1 mg  1 mg Intramuscular Q6H PRN    benztropine (COGENTIN) tablet 1 mg  1 mg Oral Q6H PRN    divalproex sodium (DEPAKOTE ER) 24 hr tablet 1,000 mg  1,000 mg Oral QPM    docusate sodium (COLACE) capsule 100 mg  100 mg Oral BID    haloperidol (HALDOL) tablet 5 mg  5 mg Oral Q6H PRN    haloperidol lactate (HALDOL) injection 5 mg  5 mg Intramuscular Q6H PRN    hydrOXYzine HCL (ATARAX) tablet 25 mg  25 mg Oral Q6H PRN    hydrOXYzine HCL (ATARAX) tablet 50 mg  50 mg Oral Q4H PRN    lamoTRIgine (LaMICtal) tablet 100 mg  100 mg Oral BID    LORazepam (ATIVAN) 2 mg/mL injection 2 mg  2 mg Intramuscular Q6H PRN    LORazepam (ATIVAN) tablet 1 mg  1 mg Oral TID    magnesium hydroxide (MILK OF MAGNESIA) 400 mg/5 mL oral suspension 30 mL  30 mL Oral Daily PRN    multivitamin-minerals (CENTRUM) tablet 1 tablet  1 tablet Oral Daily    OLANZapine (ZyPREXA) IM injection 10 mg  10 mg Intramuscular Q3H PRN    polyethylene glycol (MIRALAX) packet 17 g  17 g Oral Daily    QUEtiapine (SEROquel) tablet 400 mg  400 mg Oral HS    risperiDONE (RisperDAL M-TABS) dispersible tablet 1 mg  1 mg Oral Q3H PRN    traZODone (DESYREL) tablet 50 mg  50 mg Oral HS PRN    venlafaxine (EFFEXOR-XR) 24 hr capsule 150 mg  150 mg Oral Daily     Allergies   Allergen Reactions    Eggs Or Egg-Derived Products     Yellow Dye        Objective   Vitals: Blood pressure 120/74, pulse 86, temperature 98 4 °F (36 9 °C), temperature source Temporal, resp  rate 18, height 5' 7" (1 702 m), weight 78 kg (172 lb), SpO2 96 %  No intake or output data in the 24 hours ending 07/12/19 1031  Invasive Devices     None                 Physical Exam   Constitutional: She is oriented to person, place, and time  She appears well-developed and well-nourished  HENT:   Head: Normocephalic and atraumatic  Eyes: Pupils are equal, round, and reactive to light  Conjunctivae and EOM are normal    Neck: Normal range of motion  Neck supple  No tracheal deviation present  Cardiovascular: Normal rate, regular rhythm, normal heart sounds and intact distal pulses  Pulmonary/Chest: Effort normal and breath sounds normal  No respiratory distress  She has no wheezes  She has no rales  Abdominal: Soft  Bowel sounds are normal  She exhibits no distension  There is no tenderness  Musculoskeletal: Normal range of motion  She exhibits no edema or tenderness  Neurological: She is alert and oriented to person, place, and time  Skin: Skin is warm and dry  No rash noted  No erythema  Nursing note and vitals reviewed  Lab Results: I have personally reviewed pertinent reports          Code Status: Level 1 - Full Code  Advance Directive and Living Will:      Power of :    POLST:      Counseling / Coordination of Care  Total floor / unit time spent today 35 minutes  Greater than 50% of total time was spent with the patient and / or family counseling and / or coordination of care  A description of the counseling / coordination of care: physical exam and evaluation

## 2019-07-12 NOTE — PROGRESS NOTES
07/12/19 0933   Team Meeting   Meeting Type Daily Rounds   Initial Conference Date 07/12/19   Patient/Family Present   Patient Present No   Patient's Family Present No     Daily Psychiatric Rounds    Team Members Present:    MD Nery Barbosa, CHRISTIANNE White, [facility]  Paulo Mcmullen, RN LON Huynh, RN    Discussion:     Readmission reviewed  Comes in from home with mom, multiple services in place  Currently controlled  Evasive at times  Denies acute concerns

## 2019-07-12 NOTE — PLAN OF CARE
Problem: Ineffective Coping  Goal: Cooperates with admission process  Description  Interventions:   - Complete admission process  Outcome: Completed  Goal: Identifies ineffective coping skills  Outcome: Not Progressing  Goal: Identifies healthy coping skills  Outcome: Progressing  Goal: Demonstrates healthy coping skills  Outcome: Progressing  Goal: Participates in unit activities  Description  Interventions:  - Provide therapeutic environment   - Provide required programming   - Redirect inappropriate behaviors   Outcome: Not Progressing  Goal: Patient/Family participate in treatment and DC plans  Description  Interventions:  - Provide therapeutic environment  Outcome: Not Progressing  Goal: Patient/Family verbalizes awareness of resources  Outcome: Not Progressing  Goal: Understands least restrictive measures  Description  Interventions:  - Utilize least restrictive behavior  Outcome: Progressing  Goal: Free from restraint events  Description  - Utilize least restrictive measures   - Provide behavioral interventions   - Redirect inappropriate behaviors   Outcome: Progressing     Problem: Risk for Self Injury/Neglect  Goal: Verbalize thoughts and feelings  Description  Interventions:  - Assess and re-assess patient's lethality and potential for self-injury  - Engage patient in 1:1 interactions, daily, for a minimum of 15 minutes  - Encourage patient to express feelings, fears, frustrations, hopes  - Establish rapport/trust with patient   Outcome: Progressing  Goal: Refrain from harming self  Description  Interventions:  - Monitor patient closely, per order  - Develop a trusting relationship  - Supervise medication ingestion, monitor effects and side effects   Outcome: Progressing  Goal: Attend and participate in unit activities, including therapeutic, recreational, and educational groups  Description  Interventions:  - Provide therapeutic and educational activities daily, encourage attendance and participation, and document same in the medical record  - Obtain collateral information, encourage visitation and family involvement in care   Outcome: Not Progressing  Goal: Recognize maladaptive responses and adopt new coping mechanisms  Outcome: Progressing  Goal: Complete daily ADLs, including personal hygiene independently, as able  Description  Interventions:  - Observe, teach, and assist patient with ADLS  - Monitor and promote a balance of rest/activity, with adequate nutrition and elimination  Outcome: Progressing     Problem: Anxiety  Goal: Anxiety is at manageable level  Description  Interventions:  - Assess and monitor patient's anxiety level  - Monitor for signs and symptoms of anxiety both physical and emotional (heart palpitations, chest pain, shortness of breath, headaches, nausea, feeling jumpy, restlessness, irritable, apprehensive)  - Collaborate with interdisciplinary team and initiate plan and interventions as ordered    - [place] patient to unit/surroundings  - Explain treatment plan  - Encourage participation in care  - Encourage verbalization of concerns/fears  - Identify coping mechanisms  - Assist in developing anxiety-reducing skills  - Administer/offer alternative therapies  - Limit or eliminate stimulants  Outcome: Progressing     Problem: Ineffective Coping  Goal: Participates in unit activities  Description  Interventions:  - Provide therapeutic environment   - Provide required programming   - Redirect inappropriate behaviors   Outcome: Progressing

## 2019-07-12 NOTE — SOCIAL WORK
YOLANDA attempted to meet with pt for discussion of her concerns following readmission to [facility]; however, pt declined to speak with SW, having stated that she feels very tired  YOLANDA noted that [facility] meeting is scheduled for 2 pm today, regarding which pt agreed that SW can return to request her participation at that time
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
## 2019-07-12 NOTE — PROGRESS NOTES
Patient has been withdrawn to her room all evening, refused to come out for snack  Presents with flat affect, depressed and anxious mood  Very guarded during assessment, just stated "I don't want to talk about it now"  Did CFS on the unit  Will continue to observe

## 2019-07-12 NOTE — PROGRESS NOTES
Patient has been sleeping well through the night  No changes or problems  Q 7 minute safety checks ongoing

## 2019-07-12 NOTE — PROGRESS NOTES
Prior to the beginning of [address] meeting, staff went to request pt's participation, following which she verbalized angry response / refusal to participate, having verbalized that she does not care about meeting  As meeting must meet timeline for completion, MD, nurse, and SW signed [address] that was created to assist pt in meeting goals necessary to address concerns that pt presents  SW will meet again with pt to review [address] for her input / signature

## 2019-07-13 RX ADMIN — ACETAMINOPHEN 975 MG: 325 TABLET ORAL at 16:46

## 2019-07-13 RX ADMIN — Medication 1 TABLET: at 08:55

## 2019-07-13 RX ADMIN — QUETIAPINE 400 MG: 200 TABLET, FILM COATED ORAL at 21:10

## 2019-07-13 RX ADMIN — LAMOTRIGINE 100 MG: 100 TABLET ORAL at 16:45

## 2019-07-13 RX ADMIN — VENLAFAXINE HYDROCHLORIDE 150 MG: 150 CAPSULE, EXTENDED RELEASE ORAL at 08:55

## 2019-07-13 RX ADMIN — LORAZEPAM 1 MG: 1 TABLET ORAL at 16:45

## 2019-07-13 RX ADMIN — DOCUSATE SODIUM 100 MG: 100 CAPSULE, LIQUID FILLED ORAL at 16:51

## 2019-07-13 RX ADMIN — LORAZEPAM 1 MG: 1 TABLET ORAL at 08:55

## 2019-07-13 RX ADMIN — LAMOTRIGINE 100 MG: 100 TABLET ORAL at 08:55

## 2019-07-13 RX ADMIN — DOCUSATE SODIUM 100 MG: 100 CAPSULE, LIQUID FILLED ORAL at 08:55

## 2019-07-13 RX ADMIN — LORAZEPAM 1 MG: 1 TABLET ORAL at 21:10

## 2019-07-13 RX ADMIN — DIVALPROEX SODIUM 1000 MG: 250 TABLET, EXTENDED RELEASE ORAL at 16:46

## 2019-07-13 NOTE — SOCIAL WORK
SW met with pt who expressed willingness to discuss concerns, regarding which she stated that during afternoon hours, she felt dizzy and SOB upon exertion, having noted that, reportedly, her psychiatrist, Dr Kunal Kaur, had informed her that such symptoms might be side effects of Depakote use  Pt verbalized desire to discuss this matter with Dr Honey Samuel when she meets with him on Monday  Pt stated that she is now feeling better and ate well for supper  Pt related that she does not know why she was non-compliant with medication management at home, having stated that such behavior on her part was cause of verbal conflict with her mother who, reportedly, has stated to pt that she will not visit pt at [facility]  Pt related that she is pleased with Dr Kunal Kaur and attends art therapy at his [facility]  Also, as [facility] reportedly will open a psych rehab program, pt noted intention to change from [facility] to [address] rehab  SW met with pt to discuss [address] regarding which she identified goals of depression / anxiety / ineffective coping as areas that she would like to address in [facility], in particular  Pt understood [address] that she signed  SW noted that SW will call her mother as pt had provided HEIDI during previous admission, regarding which she was in agreement

## 2019-07-13 NOTE — PROGRESS NOTES
Progress Note - [facility] V Alvarado 30 y o  female MRN: [id number]   Unit/Bed#: [facility] 245-01 Encounter: [id number]    Behavior over the last 24 hours:  [facility] was seen for an inpatient follow-up psychiatric visit this date  She did not wish to engage in conversation at this time and refused to answer questions  ROS: no complaints    Mental Status Evaluation:    Appearance:  dressed appropriately   Behavior:  calm, angry, uncooperative   Speech:  normal rate and volume   Mood:  labile, irritable, angry   Affect:  mood-congruent   Thought Process:  organized, logical, coherent   Associations: intact associations   Thought Content:  normal   Perceptual Disturbances: none   Risk Potential: Suicidal ideation - None  Homicidal ideation - None  Potential for aggression - No   Sensorium:  oriented to person, place and time/date   Memory:  recent and remote memory grossly intact   Consciousness:  alert and awake   Attention: decreased concentration and decreased attention span   Insight:  poor   Judgment: poor   Gait/Station: normal gait/station, normal balance   Motor Activity: no abnormal movements     Vital signs in last 24 hours:    Temp:  [97 5 °F (36 4 °C)-97 8 °F (36 6 °C)] 97 8 °F (36 6 °C)  HR:  [88-91] 88  Resp:  [16] 16  BP: ()/(54-56) 98/56    Laboratory results:  I have personally reviewed all pertinent laboratory/tests results  Progress Toward Goals: No change    Assessment/Plan   Principal Problem:    Bipolar 1 disorder, depressed, severe (HCC)  Active Problems:    TBI (traumatic brain injury) ([facility] )    Autism spectrum    Tourette's    Recommended Treatment:   Continue current medications  Continue to monitor  Discharge disposition and planning are ongoing  All current active medications have been reviewed    Encourage group therapy, milieu therapy and occupational therapy  Behavioral Health checks every 7 minutes      Current Facility-Administered Medications:  acetaminophen 650 mg Oral Q6H PRN Lisette Osler Lodics, CRNP   acetaminophen 650 mg Oral Q4H PRN Su Logan, CRNP   acetaminophen 975 mg Oral Q6H PRN Su Logan, CRNP   aluminum-magnesium hydroxide-simethicone 30 mL Oral Q4H PRN Su Logan, CRNP   benztropine 1 mg Intramuscular Q6H PRN Su Carsonics, CRNP   benztropine 1 mg Oral Q6H PRN Su Logan, CRNP   divalproex sodium 1,000 mg Oral QPM Su Logan, CRNP   docusate sodium 100 mg Oral BID Su Logan, CRNP   haloperidol 5 mg Oral Q6H PRN Su Logan, CRNP   haloperidol lactate 5 mg Intramuscular Q6H PRN Su Logan, CRNP   hydrOXYzine HCL 25 mg Oral Q6H PRN Su Logan, CRNP   hydrOXYzine HCL 50 mg Oral Q4H PRN Su Logan, CRNP   lamoTRIgine 100 mg Oral BID Su Logan, CRNP   LORazepam 2 mg Intramuscular Q6H PRN Su Logan, CRNP   LORazepam 1 mg Oral TID Su Logan, CRNP   magnesium hydroxide 30 mL Oral Daily PRN uS Logan, CRNP   multivitamin-minerals 1 tablet Oral Daily Su Logan, CRNP   OLANZapine 10 mg Intramuscular Q3H PRN Su Logan, CRNP   polyethylene glycol 17 g Oral Daily Su Logan, CRNP   QUEtiapine 400 mg Oral HS Su Logan, CRNP   risperiDONE 1 mg Oral Q3H PRN Su Logan, CRNP   traZODone 50 mg Oral HS PRN Su Logan, CRNP   venlafaxine 150 mg Oral Daily Su Logan, CRNP       Risks / Benefits of Treatment:    Risks, benefits, and possible side effects of medications explained to patient and patient verbalizes understanding and agreement for treatment  Counseling / Coordination of Care:      Patient's progress discussed with staff in treatment team meeting  Medications, treatment progress and treatment plan reviewed with patient

## 2019-07-13 NOTE — PROGRESS NOTES
Patient has been visible on unit but appears to be keeping to herself  Pleasant during interaction but very minimal with answers during assessment  Denies SI/HI/hallucinations  Compliant with medications  Will continue to monitor

## 2019-07-14 RX ADMIN — LORAZEPAM 1 MG: 1 TABLET ORAL at 21:34

## 2019-07-14 RX ADMIN — LORAZEPAM 1 MG: 1 TABLET ORAL at 09:10

## 2019-07-14 RX ADMIN — DIVALPROEX SODIUM 1000 MG: 250 TABLET, EXTENDED RELEASE ORAL at 17:38

## 2019-07-14 RX ADMIN — DOCUSATE SODIUM 100 MG: 100 CAPSULE, LIQUID FILLED ORAL at 17:39

## 2019-07-14 RX ADMIN — LAMOTRIGINE 100 MG: 100 TABLET ORAL at 17:39

## 2019-07-14 RX ADMIN — LAMOTRIGINE 100 MG: 100 TABLET ORAL at 09:10

## 2019-07-14 RX ADMIN — QUETIAPINE 400 MG: 200 TABLET, FILM COATED ORAL at 21:34

## 2019-07-14 RX ADMIN — Medication 1 TABLET: at 09:10

## 2019-07-14 RX ADMIN — LORAZEPAM 1 MG: 1 TABLET ORAL at 17:39

## 2019-07-14 RX ADMIN — VENLAFAXINE HYDROCHLORIDE 150 MG: 150 CAPSULE, EXTENDED RELEASE ORAL at 09:10

## 2019-07-14 RX ADMIN — DOCUSATE SODIUM 100 MG: 100 CAPSULE, LIQUID FILLED ORAL at 09:10

## 2019-07-14 NOTE — PROGRESS NOTES
Progress Note - [facility] [address] 30 y o  female MRN: [id number]   Unit/Bed#: U 245-01 Encounter: [id number]    Behavior over the last 24 hours:  Jayme Vanegas was seen for brief psychiatric visit this date  She did not wish to speak or answer questions and has remained in her room most of the day  She continues to test boundaries and limits  She stays in the dining room for hours at a time while eating  She refuses to come out of her room and participate in any activities  She is taking her medications and denies side effects  Appetite and sleep are within normal limits  ROS: no complaints    Mental Status Evaluation:    Appearance:  disheveled, marginal hygiene   Behavior:  calm   Speech:  normal rate and volume   Mood:  dysphoric, irritable   Affect:  normal range and intensity   Thought Process:  organized, logical, coherent   Associations: intact associations   Thought Content:  normal, no overt delusions   Perceptual Disturbances: none   Risk Potential: Suicidal ideation - None  Homicidal ideation - None  Potential for aggression - No   Sensorium:  oriented to person, place and time/date   Memory:  recent and remote memory grossly intact   Consciousness:  alert and awake   Attention: decreased concentration and decreased attention span   Insight:  poor   Judgment: poor   Gait/Station: normal gait/station, normal balance   Motor Activity: no abnormal movements     Vital signs in last 24 hours:    Temp:  [97 5 °F (36 4 °C)-97 6 °F (36 4 °C)] 97 5 °F (36 4 °C)  HR:  [] 84  Resp:  [18] 18  BP: (109-118)/(56-67) 109/56    Laboratory results:  I have personally reviewed all pertinent laboratory/tests results      Progress Toward Goals: poor insight, poor motivation, stays in the room    Assessment/Plan   Principal Problem:    Bipolar 1 disorder, depressed, severe ([facility] )  Active Problems:    TBI (traumatic brain injury) ([facility] 75 )    Autism spectrum    Tourette's    Recommended Treatment:   Continue current medication  Will discuss case at psychiatric rounds tomorrow and consider behavioral contract  Discharge disposition and planning are ongoing  All current active medications have been reviewed    Encourage group therapy, milieu therapy and occupational therapy  [address] checks every 7 minutes      Current Facility-Administered Medications:  acetaminophen 650 mg Oral Q6H PRN Su Logan, CRNP   acetaminophen 650 mg Oral Q4H PRN Su Carsonics, CRNP   acetaminophen 975 mg Oral Q6H PRN Su Logan, CRNP   aluminum-magnesium hydroxide-simethicone 30 mL Oral Q4H PRN Su Logan, CRNP   benztropine 1 mg Intramuscular Q6H PRN Su Logan, CRNP   benztropine 1 mg Oral Q6H PRN Su Logan, CRNP   divalproex sodium 1,000 mg Oral QPM Su Logan, CRNP   docusate sodium 100 mg Oral BID Su Logan, CRNP   haloperidol 5 mg Oral Q6H PRN Su Logan, CRNP   haloperidol lactate 5 mg Intramuscular Q6H PRN Su Logan, CRNP   hydrOXYzine HCL 25 mg Oral Q6H PRN Su Logan, CRNP   hydrOXYzine HCL 50 mg Oral Q4H PRN Su Logan, CRNP   lamoTRIgine 100 mg Oral BID Su Logan, CRNP   LORazepam 2 mg Intramuscular Q6H PRN Su Logan, CRNP   LORazepam 1 mg Oral TID Su Logan, CRNP   magnesium hydroxide 30 mL Oral Daily PRN Su Logan, CRNP   multivitamin-minerals 1 tablet Oral Daily Su Logan, CRNP   OLANZapine 10 mg Intramuscular Q3H PRN Su Logan, CRNP   polyethylene glycol 17 g Oral Daily Su Logan, CRNP   QUEtiapine 400 mg Oral HS Su Logan, CRNP   risperiDONE 1 mg Oral Q3H PRN Su Logan, CRNP   traZODone 50 mg Oral HS PRN Su Logan, CRNP   venlafaxine 150 mg Oral Daily Su Logan, CRNP       Risks / Benefits of Treatment:    Risks, benefits, and possible side effects of medications explained to patient and patient verbalizes understanding and agreement for treatment  Counseling / Coordination of Care:      Patient's progress discussed with staff in treatment team meeting  Medications, treatment progress and treatment plan reviewed with patient

## 2019-07-14 NOTE — PLAN OF CARE
Problem: Ineffective Coping  Goal: Identifies ineffective coping skills  Outcome: Not Progressing  Goal: Identifies healthy coping skills  Outcome: Not Progressing  Goal: Demonstrates healthy coping skills  Outcome: Not Progressing  Goal: Participates in unit activities  Description  Interventions:  - Provide therapeutic environment   - Provide required programming   - Redirect inappropriate behaviors   Outcome: Not Progressing  Goal: Patient/Family participate in treatment and DC plans  Description  Interventions:  - Provide therapeutic environment  Outcome: Progressing  Goal: Patient/Family verbalizes awareness of resources  Outcome: Progressing  Goal: Understands least restrictive measures  Description  Interventions:  - Utilize least restrictive behavior  Outcome: Progressing  Goal: Free from restraint events  Description  - Utilize least restrictive measures   - Provide behavioral interventions   - Redirect inappropriate behaviors   Outcome: Progressing     Problem: Risk for Self Injury/Neglect  Goal: Treatment Goal: Remain safe during length of stay, learn and adopt new coping skills, and be free of self-injurious ideation, impulses and acts at the time of discharge  Outcome: Progressing  Goal: Verbalize thoughts and feelings  Description  Interventions:  - Assess and re-assess patient's lethality and potential for self-injury  - Engage patient in 1:1 interactions, daily, for a minimum of 15 minutes  - Encourage patient to express feelings, fears, frustrations, hopes  - Establish rapport/trust with patient   Outcome: Progressing  Goal: Refrain from harming self  Description  Interventions:  - Monitor patient closely, per order  - Develop a trusting relationship  - Supervise medication ingestion, monitor effects and side effects   Outcome: Progressing  Goal: Attend and participate in unit activities, including therapeutic, recreational, and educational groups  Description  Interventions:  - Provide therapeutic and educational activities daily, encourage attendance and participation, and document same in the medical record  - Obtain collateral information, encourage visitation and family involvement in care   Outcome: Not Progressing  Goal: Recognize maladaptive responses and adopt new coping mechanisms  Outcome: Progressing  Goal: Complete daily ADLs, including personal hygiene independently, as able  Description  Interventions:  - Observe, teach, and assist patient with ADLS  - Monitor and promote a balance of rest/activity, with adequate nutrition and elimination  Outcome: Progressing     Problem: Anxiety  Goal: Anxiety is at manageable level  Description  Interventions:  - Assess and monitor patient's anxiety level  - Monitor for signs and symptoms of anxiety both physical and emotional (heart palpitations, chest pain, shortness of breath, headaches, nausea, feeling jumpy, restlessness, irritable, apprehensive)  - Collaborate with interdisciplinary team and initiate plan and interventions as ordered    - [place] patient to unit/surroundings  - Explain treatment plan  - Encourage participation in care  - Encourage verbalization of concerns/fears  - Identify coping mechanisms  - Assist in developing anxiety-reducing skills  - Administer/offer alternative therapies  - Limit or eliminate stimulants  Outcome: Progressing     Problem: Ineffective Coping  Goal: Participates in unit activities  Description  Interventions:  - Provide therapeutic environment   - Provide required programming   - Redirect inappropriate behaviors   Outcome: Not Progressing

## 2019-07-14 NOTE — PROGRESS NOTES
07/14/19 1103   Team Meeting   Meeting Type Daily Rounds   Initial Conference Date 07/14/19   Team Members Present   Team Members Present Physician;Nurse   Physician Team Member [id number]   Nursing Team Member Debbie Nunes RN   Patient/Family Present   Patient Present No   Patient's Family Present No     Daily Psychiatric Rounding    Team Members Present    CHRISTIANNE Osorio, RN

## 2019-07-14 NOTE — NURSING NOTE
Pt withdrawn to room  Social with peers  Childlike  Attention seeking  Staff splitting  Uncooperative with redirection  Testing limits  Mood less labile  Compliant with medications  No group participation  Ignores staff attempts at encouraging participation  Denies SI, HI, or hallucinations  Safety precautions maintained  Will continue to monitor and assess

## 2019-07-14 NOTE — PROGRESS NOTES
Patient has been visible on the unit  Selectively social with peers  Reports having a "so-so" day  Feels like the day was "more bad than good " Says, "I feel like I was more ok when I was in my room " Patient blaming others for her anxiety and mood during the day stating, "it's some of the people around here " Compliant with medications  Denies SI/HI  Will continue to monitor

## 2019-07-15 PROCEDURE — 99231 SBSQ HOSP IP/OBS SF/LOW 25: CPT | Performed by: PSYCHIATRY & NEUROLOGY

## 2019-07-15 RX ORDER — VENLAFAXINE HYDROCHLORIDE 37.5 MG/1
75 CAPSULE, EXTENDED RELEASE ORAL DAILY
Status: DISCONTINUED | OUTPATIENT
Start: 2019-07-16 | End: 2019-07-17 | Stop reason: HOSPADM

## 2019-07-15 RX ADMIN — LAMOTRIGINE 100 MG: 100 TABLET ORAL at 08:42

## 2019-07-15 RX ADMIN — QUETIAPINE 400 MG: 200 TABLET, FILM COATED ORAL at 21:15

## 2019-07-15 RX ADMIN — LORAZEPAM 1 MG: 1 TABLET ORAL at 17:49

## 2019-07-15 RX ADMIN — Medication 1 TABLET: at 08:42

## 2019-07-15 RX ADMIN — LORAZEPAM 1 MG: 1 TABLET ORAL at 08:42

## 2019-07-15 RX ADMIN — LAMOTRIGINE 100 MG: 100 TABLET ORAL at 17:50

## 2019-07-15 RX ADMIN — DIVALPROEX SODIUM 1000 MG: 250 TABLET, EXTENDED RELEASE ORAL at 17:50

## 2019-07-15 RX ADMIN — VENLAFAXINE HYDROCHLORIDE 150 MG: 150 CAPSULE, EXTENDED RELEASE ORAL at 08:42

## 2019-07-15 RX ADMIN — DOCUSATE SODIUM 100 MG: 100 CAPSULE, LIQUID FILLED ORAL at 08:42

## 2019-07-15 RX ADMIN — LORAZEPAM 1 MG: 1 TABLET ORAL at 21:15

## 2019-07-15 RX ADMIN — DOCUSATE SODIUM 100 MG: 100 CAPSULE, LIQUID FILLED ORAL at 17:49

## 2019-07-15 NOTE — PLAN OF CARE
Problem: Ineffective Coping  Goal: Identifies ineffective coping skills  Outcome: Progressing  Goal: Identifies healthy coping skills  Outcome: Progressing  Goal: Demonstrates healthy coping skills  Outcome: Progressing  Goal: Participates in unit activities  Description  Interventions:  - Provide therapeutic environment   - Provide required programming   - Redirect inappropriate behaviors   Outcome: Progressing  Goal: Patient/Family participate in treatment and DC plans  Description  Interventions:  - Provide therapeutic environment  Outcome: Progressing  Goal: Patient/Family verbalizes awareness of resources  Outcome: Progressing  Goal: Understands least restrictive measures  Description  Interventions:  - Utilize least restrictive behavior  Outcome: Progressing  Goal: Free from restraint events  Description  - Utilize least restrictive measures   - Provide behavioral interventions   - Redirect inappropriate behaviors   Outcome: Progressing     Problem: Risk for Self Injury/Neglect  Goal: Treatment Goal: Remain safe during length of stay, learn and adopt new coping skills, and be free of self-injurious ideation, impulses and acts at the time of discharge  Outcome: Progressing  Goal: Verbalize thoughts and feelings  Description  Interventions:  - Assess and re-assess patient's lethality and potential for self-injury  - Engage patient in 1:1 interactions, daily, for a minimum of 15 minutes  - Encourage patient to express feelings, fears, frustrations, hopes  - Establish rapport/trust with patient   Outcome: Progressing  Goal: Refrain from harming self  Description  Interventions:  - Monitor patient closely, per order  - Develop a trusting relationship  - Supervise medication ingestion, monitor effects and side effects   Outcome: Progressing  Goal: Attend and participate in unit activities, including therapeutic, recreational, and educational groups  Description  Interventions:  - Provide therapeutic and educational activities daily, encourage attendance and participation, and document same in the medical record  - Obtain collateral information, encourage visitation and family involvement in care   Outcome: Progressing  Goal: Recognize maladaptive responses and adopt new coping mechanisms  Outcome: Progressing  Goal: Complete daily ADLs, including personal hygiene independently, as able  Description  Interventions:  - Observe, teach, and assist patient with ADLS  - Monitor and promote a balance of rest/activity, with adequate nutrition and elimination  Outcome: Progressing     Problem: Anxiety  Goal: Anxiety is at manageable level  Description  Interventions:  - Assess and monitor patient's anxiety level  - Monitor for signs and symptoms of anxiety both physical and emotional (heart palpitations, chest pain, shortness of breath, headaches, nausea, feeling jumpy, restlessness, irritable, apprehensive)  - Collaborate with interdisciplinary team and initiate plan and interventions as ordered    - [place] patient to unit/surroundings  - Explain treatment plan  - Encourage participation in care  - Encourage verbalization of concerns/fears  - Identify coping mechanisms  - Assist in developing anxiety-reducing skills  - Administer/offer alternative therapies  - Limit or eliminate stimulants  Outcome: Progressing     Problem: Ineffective Coping  Goal: Participates in unit activities  Description  Interventions:  - Provide therapeutic environment   - Provide required programming   - Redirect inappropriate behaviors   Outcome: Progressing     Problem: DISCHARGE PLANNING - CARE MANAGEMENT  Goal: Discharge to post-acute care or home with appropriate resources  Description  INTERVENTIONS:  - Conduct assessment to determine patient/family and health care team treatment goals, and need for post-acute services based on payer coverage, community resources, and patient preferences, and barriers to discharge  - Address psychosocial, clinical, and financial barriers to discharge as identified in assessment in conjunction with the patient/family and health care team  - Arrange appropriate level of post-acute services according to patient's   needs and preference and payer coverage in collaboration with the physician and health care team  - Communicate with and update the patient/family, physician, and health care team regarding progress on the discharge plan  - Arrange appropriate transportation to post-acute venues   Outcome: Progressing

## 2019-07-15 NOTE — PROGRESS NOTES
Patient was withdrawn to room resting for assessment  Reports she is doing "a little bit better" since admission  Denies SI/HI/hallucinations  Denies anxiety or depression  Denies having had any issues during the day  Offers no complaints  Compliant with medications  Reports being tired  Able to make needs known  Will continue to monitor

## 2019-07-15 NOTE — PROGRESS NOTES
Pt bright and social with peers and staff today  Cooperative with meals and medication, including more appropriate amount of time spent on meals  Pt reports no anxiety, depression, hallucinations or thoughts of self harm  Pt able to express needs  Safety precautions maintained  Will continue to monitor and assess

## 2019-07-15 NOTE — PROGRESS NOTES
Progress Note - [address] 30 y o  female MRN: [id number]  Unit/Bed#: U 245-01 Encounter: [id number]    Assessment/Plan   Principal Problem:    Bipolar 1 disorder, depressed, severe ([facility] 75 )  Active Problems:    TBI (traumatic brain injury) ([facility] 75 )    Autism spectrum    Tourette's        Behavior over the last 24 hours:    Patient was noted to be lying in the same bed with her roommate so the room was changed  Patient became upset and demanded that she would get discharged back home to her mother  Patient seems immature and child-like  Sleep: normal  Appetite: normal  Medication side effects: No  ROS: no complaints    Mental Status Evaluation:  Appearance:  disheveled   Behavior:  uncooperative   Speech:  loud   Mood:  irritable   Affect:  labile   Thought Process:  concrete   Thought Content:  normal   Perceptual Disturbances: None   Risk Potential: Suicidal Ideations without plan   Sensorium:  person and place   Cognition:  grossly intact   Consciousness:  awake    Attention: attention span appeared shorter than expected for age   Insight:  limited   Judgment: limited   Gait/Station: normal gait/station   Motor Activity: no abnormal movements     Progress Toward Goals: ongoing    Recommended Treatment: Continue with group therapy, milieu therapy and occupational therapy  Risks, benefits and possible side effects of Medications:   Risks, benefits, and possible side effects of medications explained to patient and patient verbalizes understanding  Medications: all current active meds have been reviewed and continue current psychiatric medications  Labs: reviewed    Counseling / Coordination of Care  Total floor / unit time spent today 15 minutes  Greater than 50% of total time was spent with the patient and / or family counseling and / or coordination of care   A description of the counseling / coordination of care:

## 2019-07-16 LAB
CHOLEST SERPL-MCNC: 180 MG/DL (ref 0–200)
GLUCOSE P FAST SERPL-MCNC: 95 MG/DL (ref 65–99)
HDLC SERPL-MCNC: 59 MG/DL (ref 40–60)
LDLC SERPL CALC-MCNC: 86 MG/DL (ref 0–100)
NONHDLC SERPL-MCNC: 121 MG/DL
TRIGL SERPL-MCNC: 176 MG/DL (ref 44–166)
VALPROATE SERPL-MCNC: 61.7 UG/ML (ref 50–125)

## 2019-07-16 PROCEDURE — 99231 SBSQ HOSP IP/OBS SF/LOW 25: CPT | Performed by: PSYCHIATRY & NEUROLOGY

## 2019-07-16 PROCEDURE — 82947 ASSAY GLUCOSE BLOOD QUANT: CPT | Performed by: PSYCHIATRY & NEUROLOGY

## 2019-07-16 PROCEDURE — 80061 LIPID PANEL: CPT | Performed by: PSYCHIATRY & NEUROLOGY

## 2019-07-16 PROCEDURE — 80164 ASSAY DIPROPYLACETIC ACD TOT: CPT | Performed by: PSYCHIATRY & NEUROLOGY

## 2019-07-16 RX ADMIN — LORAZEPAM 1 MG: 1 TABLET ORAL at 09:09

## 2019-07-16 RX ADMIN — VENLAFAXINE HYDROCHLORIDE 75 MG: 37.5 CAPSULE, EXTENDED RELEASE ORAL at 09:09

## 2019-07-16 RX ADMIN — DOCUSATE SODIUM 100 MG: 100 CAPSULE, LIQUID FILLED ORAL at 09:09

## 2019-07-16 RX ADMIN — DIVALPROEX SODIUM 1000 MG: 250 TABLET, EXTENDED RELEASE ORAL at 18:12

## 2019-07-16 RX ADMIN — LAMOTRIGINE 100 MG: 100 TABLET ORAL at 09:09

## 2019-07-16 RX ADMIN — LORAZEPAM 1 MG: 1 TABLET ORAL at 21:05

## 2019-07-16 RX ADMIN — LAMOTRIGINE 100 MG: 100 TABLET ORAL at 18:12

## 2019-07-16 RX ADMIN — LORAZEPAM 1 MG: 1 TABLET ORAL at 16:52

## 2019-07-16 RX ADMIN — DOCUSATE SODIUM 100 MG: 100 CAPSULE, LIQUID FILLED ORAL at 18:12

## 2019-07-16 RX ADMIN — Medication 1 TABLET: at 09:09

## 2019-07-16 RX ADMIN — QUETIAPINE 400 MG: 200 TABLET, FILM COATED ORAL at 21:04

## 2019-07-16 RX ADMIN — ACETAMINOPHEN 975 MG: 325 TABLET ORAL at 16:51

## 2019-07-16 NOTE — DISCHARGE INSTR - OTHER ORDERS
You will discharge home to [address] [facility], [address] [facility], [address]; Phone:  [phone]  Crisis Plan:    Triggers you identified during your hospital stay that led to feelings of rage, conflicts with your mother, noncompliance with medication directives and thoughts of suicide include:  Impulsivity; lack of anger management; and poor emotional control  Coping skills you identified during your hospital stay include:  Music; art; and socialization with friends  After discharge, if you find your coping skills are not effective and you continue to feel distressed, please talk with your Mother and / or contact your therapist at [facility] and / or your targeted , Patricia Acevedo, at [place] [address]  If that is not effective and you feel you are a danger to yourself or others, please contact [address]: [phone], [address]:  [phone], Peer Support Talk Line (Seven days a week, 1:00 PM  9:00 PM) Call: [phone] or Text: 115.505.1635, Alcohol Anonymous: 825.428.7321, [place] [facility]: 663.694.8086, Lukas on [address] [place] [address] ([facility]) [employer]: 949.230.5122/Email: www kwaku  org, or Substance Abuse and Mental Health Services Administration([facility]) [employer], which is a confidential, free, 24-hour-a-day, 365-day-a-year, information service for individuals and family members facing mental health and/or substance use disorders  This service provides referrals to local treatment facilities, support groups, and community-based organizations  Callers can also order free publications and other information    Call 4-288.499.3175/IPUMAUWO: www [facility] gov

## 2019-07-16 NOTE — PROGRESS NOTES
Slept well during most q 7 minute safety checks  No respiratory distress or changes in medical condition  Sleep has been sound and undisturbed  No suicidal ideations noted  Will continue to monitor

## 2019-07-16 NOTE — PROGRESS NOTES
Daily Rounds Documentation    Team Members Present:   Dr Tacho Jason, MD Gurdeep Osei, CHRISTIANNE Mcdonald, RN  Chan Singh, LSW   Richard Saenz, [facility]     [facility] 7  Bright  No behavioral issues  Discharge Wednesday

## 2019-07-16 NOTE — PLAN OF CARE
Problem: Risk for Self Injury/Neglect  Goal: Verbalize thoughts and feelings  Description  Interventions:  - Assess and re-assess patient's lethality and potential for self-injury  - Engage patient in 1:1 interactions, daily, for a minimum of 15 minutes  - Encourage patient to express feelings, fears, frustrations, hopes  - Establish rapport/trust with patient   Outcome: Completed  Goal: Refrain from harming self  Description  Interventions:  - Monitor patient closely, per order  - Develop a trusting relationship  - Supervise medication ingestion, monitor effects and side effects   Outcome: Completed  Goal: Complete daily ADLs, including personal hygiene independently, as able  Description  Interventions:  - Observe, teach, and assist patient with ADLS  - Monitor and promote a balance of rest/activity, with adequate nutrition and elimination  Outcome: Completed   Pt has progressed to the point of completing the above listed goals  Problem: Risk for Self Injury/Neglect  Goal: Treatment Goal: Remain safe during length of stay, learn and adopt new coping skills, and be free of self-injurious ideation, impulses and acts at the time of discharge  Outcome: Progressing  Goal: Recognize maladaptive responses and adopt new coping mechanisms  Outcome: Progressing     Problem: Anxiety  Goal: Anxiety is at manageable level  Description  Interventions:  - Assess and monitor patient's anxiety level  - Monitor for signs and symptoms of anxiety both physical and emotional (heart palpitations, chest pain, shortness of breath, headaches, nausea, feeling jumpy, restlessness, irritable, apprehensive)  - Collaborate with interdisciplinary team and initiate plan and interventions as ordered    - [place] patient to unit/surroundings  - Explain treatment plan  - Encourage participation in care  - Encourage verbalization of concerns/fears  - Identify coping mechanisms  - Assist in developing anxiety-reducing skills  - Administer/offer alternative therapies  - Limit or eliminate stimulants  Outcome: Progressing     Problem: Ineffective Coping  Goal: Participates in unit activities  Description  Interventions:  - Provide therapeutic environment   - Provide required programming   - Redirect inappropriate behaviors   Outcome: Progressing     Problem: DISCHARGE PLANNING - CARE MANAGEMENT  Goal: Discharge to post-acute care or home with appropriate resources  Description  INTERVENTIONS:  - Conduct assessment to determine patient/family and health care team treatment goals, and need for post-acute services based on payer coverage, community resources, and patient preferences, and barriers to discharge  - Address psychosocial, clinical, and financial barriers to discharge as identified in assessment in conjunction with the patient/family and health care team  - Arrange appropriate level of post-acute services according to patient's   needs and preference and payer coverage in collaboration with the physician and health care team  - Communicate with and update the patient/family, physician, and health care team regarding progress on the discharge plan  - Arrange appropriate transportation to post-acute venues   Outcome: Progressing

## 2019-07-16 NOTE — PROGRESS NOTES
Patient out and visible on unit, attending groups  Appetite good, complaint with meals  Denies S/I, H/I, no auditory or visual hallucinations  Will continue to monitor

## 2019-07-16 NOTE — PROGRESS NOTES
During assessment patient kept head covered and did not want to talk to RN  Monosyllabic answer noted  Patient out to nursing station later for snack  Stated she is in a better mood now  No suicidal ideations currently  Good eye contact  Maintained on Q 7 minute safety checks  No homicidal behaviors  No changes in medical condition  Fluids maintained at bedside to promote hydration

## 2019-07-17 VITALS
RESPIRATION RATE: 18 BRPM | BODY MASS INDEX: 27 KG/M2 | WEIGHT: 172 LBS | HEART RATE: 99 BPM | TEMPERATURE: 98.6 F | OXYGEN SATURATION: 98 % | SYSTOLIC BLOOD PRESSURE: 132 MMHG | HEIGHT: 67 IN | DIASTOLIC BLOOD PRESSURE: 63 MMHG

## 2019-07-17 PROCEDURE — 99238 HOSP IP/OBS DSCHRG MGMT 30/<: CPT | Performed by: NURSE PRACTITIONER

## 2019-07-17 RX ORDER — QUETIAPINE FUMARATE 400 MG/1
400 TABLET, FILM COATED ORAL
Qty: 30 TABLET | Refills: 0 | Status: SHIPPED | OUTPATIENT
Start: 2019-07-17 | End: 2019-09-04 | Stop reason: HOSPADM

## 2019-07-17 RX ORDER — VENLAFAXINE HYDROCHLORIDE 75 MG/1
75 CAPSULE, EXTENDED RELEASE ORAL DAILY
Qty: 30 CAPSULE | Refills: 0 | Status: SHIPPED | OUTPATIENT
Start: 2019-07-18 | End: 2019-09-04 | Stop reason: HOSPADM

## 2019-07-17 RX ADMIN — DOCUSATE SODIUM 100 MG: 100 CAPSULE, LIQUID FILLED ORAL at 09:14

## 2019-07-17 RX ADMIN — LORAZEPAM 1 MG: 1 TABLET ORAL at 09:14

## 2019-07-17 RX ADMIN — VENLAFAXINE HYDROCHLORIDE 75 MG: 37.5 CAPSULE, EXTENDED RELEASE ORAL at 09:14

## 2019-07-17 RX ADMIN — LAMOTRIGINE 100 MG: 100 TABLET ORAL at 09:14

## 2019-07-17 RX ADMIN — Medication 1 TABLET: at 09:14

## 2019-07-17 NOTE — NURSING NOTE
Reviewed discharge instructions with patient  Prescriptions in d/c envelope  Patient to be picked up by her mother at 54

## 2019-07-17 NOTE — SOCIAL WORK
YOLANDA met with pt who expressed readiness for discharge and is looking forward to her mother's pickup at 1 pm   YOLANDA read Medicare Rights regarding which pt stated desire for discharge and signed IMM that YOLANDA faxed to Admissions

## 2019-07-17 NOTE — PLAN OF CARE
PT continues to attend at least 75% of AT groups with some encouragement at times, where she elects to sit with peers and does actively engage in group directives and in projects of choice  PT is able to follow simple group instructions and is able to seek appropriate assistance as needed  PT does require some prompting and redirection at times during discussion, however is redirectable  PT is scheduled for discharge today and does appear bright and motivated       Problem: Ineffective Coping  Goal: Participates in unit activities  Description  Interventions:  - Provide therapeutic environment   - Provide required programming   - Redirect inappropriate behaviors   Outcome: Progressing

## 2019-07-17 NOTE — DISCHARGE INSTR - APPOINTMENTS
The treatment recommends ongoing medication management and individual therapy with your current providers  An appointment has been scheduled in your behalf for Monday, July 29, 2019, at 11:45 am, with Dr Francheska Arriola at [facility], [address] [place] [address], [address] [facility], [address]; Phone: [phone]; Fax: [phone]  Your summary of care will be faxed to this provider  Also, at [facility], on July 29, 2019, ar 12:00 pm, you have been scheduled for continuation of art therapy sessions  Your mother intends to schedule an appointment in your behalf with Krista Kennedy PA-C, your new primary care physician at [facility], [address] [place] [address], [address] [facility], [address]; Phone: [phone]; Fax: [phone]  Your summary of care will be faxed to this provider

## 2019-07-17 NOTE — NURSING NOTE
AVS was placed in patient belongings which were inventoried for discharge  Pt had belongings in hand when walked off of the unit by this writer, to be received by her mother

## 2019-07-17 NOTE — SOCIAL WORK
Came out to the desk for a snack at 0430  Observed sleeping most the night  Maintain on q 7 minute safety checks  No current suicidal ideations noted  Will continue to monitor

## 2019-07-17 NOTE — PROGRESS NOTES
Pt presents for morning assessment bright and pleasant  Making good eye contact  Denies SI/HI, acute anxiety/depression  Happy when reporting discharge planned for today home with mom  Pt informs she is ready and can appropriately identify a plan for successful transition home  Pt verbalizes understanding of medication compliance following discharge  Denies any reason she shouldn't go today  Has been visible in the community and moderately social with peers  Agrees to make needs known until discharge

## 2019-07-17 NOTE — PROGRESS NOTES
Withdrawn to room tonight  No SI/HI/AH/VH noted  No overt aggression seen  Complaint with medications and meals  Good eye contact  Maintained on Q 7 minute safety checks  No changes in medical condition  Fluids maintained at bedside to promote hydration

## 2019-07-17 NOTE — BH TRANSITION RECORD
Contact Information: If you have any questions, concerns, pended studies, tests and/or procedures, or emergencies regarding your inpatient behavioral health visit  Please contact [facility] behavioral health unit [phone] and ask to speak to a , nurse or physician  A contact is available 24 hours/ 7 days a week at this number  Summary of Procedures Performed During your Stay:  Below is a list of major procedures performed during your hospital stay and a summary of results:  - No major procedures performed  Pending Studies (From admission, onward)    None        If studies are pending at discharge, follow up with your PCP and/or referring provider

## 2019-07-17 NOTE — PROGRESS NOTES
Daily Rounds Documentation    Team Members Present:   MD Nery Ortega, CHRISTIANNE Huynh, RN  Tony Galvan, [address] [place] [address]  Casie Syed, PharmD    1300 discharge  Pt feeling ready for discharge  No behaviors or concerns
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
## 2019-07-17 NOTE — DISCHARGE INSTRUCTIONS
Bipolar Disorder   WHAT YOU NEED TO KNOW:   Bipolar disorder is a long-term chemical imbalance that causes rapid changes in mood and behavior  High moods are called darien  Low moods are called depression  Sometimes you will feel manic and sometimes you will feel depressed  You can have alternating episodes of darien and depression  This is called a mixed bipolar state  DISCHARGE INSTRUCTIONS:   Call 911 if:   · You think about hurting yourself or someone else  Contact your healthcare provider or psychiatrist if:   · You are having trouble managing your bipolar disorder  · You cannot sleep, or are sleeping all the time  · You cannot eat, or are eating more than usual     · You feel dizzy or your stomach is upset  · You cannot make it to your next meeting  · You have questions or concerns about your condition or care  Medicines:   · Medicines  may be given to help keep your mood stable, or to help you sleep  Changes in medicine are often needed as your bipolar disorder changes  · Take your medicine as directed  Contact your healthcare provider if you think your medicine is not helping or if you have side effects  Tell him or her if you are allergic to any medicine  Keep a list of the medicines, vitamins, and herbs you take  Include the amounts, and when and why you take them  Bring the list or the pill bottles to follow-up visits  Carry your medicine list with you in case of an emergency  Follow up with your healthcare provider or psychiatrist as directed:  Write down your questions so you remember to ask them during your visits  Manage bipolar disorder:  Watch for triggers of bipolar disorder symptoms, such as stress  Learn new ways to relax, such as deep breathing, to manage your stress  Tell someone if you feel a manic or depressive period might be coming on  Ask a friend or family member to help watch you for bipolar symptoms   Work to develop skills that will help you manage bipolar disorder  You may need to make lifestyle changes  Ask your healthcare provider or psychiatrist for resources  For support and more information:   · [address] [facility]), [address] [facility], [address] [facility], [address]  [place] [address]   Phone: [phone]  Phone: [phone]  Web Address: Melissa tn  · Depression and [address] ([place])  [address] [place] [address], [facility] , [address]  Phone: [phone]  Web Address: [employer] no  org   © 2017 [address] [facility] Information is for End User's use only and may not be sold, redistributed or otherwise used for commercial purposes  All illustrations and images included in CareNotes® are the copyrighted property of A D A M [employer] Inc  or Noble [employer]  The above information is an  only  It is not intended as medical advice for individual conditions or treatments  Talk to your doctor, nurse or pharmacist before following any medical regimen to see if it is safe and effective for you  Metabolic Syndrome X   WHAT YOU NEED TO KNOW:   What is metabolic syndrome? Metabolic syndrome is a group of medical conditions that can increase your risk for heart disease, stroke, or type 2 diabetes  You may have metabolic syndrome if you have at least 3 of the following medical conditions:  · High triglycerides (a type of fat in your blood)    · Low HDL cholesterol (good cholesterol)    · High blood pressure    · High blood sugar levels    · Extra abdominal fat  What increases my risk for metabolic syndrome? The exact cause of metabolic syndrome is not known  Your risk for metabolic syndrome increases if you have insulin resistance  Insulin is a hormone that helps your body take sugar out of your blood and use it for energy   Insulin resistance means your pancreas keeps making insulin but your body cannot use it correctly  Your risk for metabolic syndrome also increases as you age, if you are overweight or obese, or you do not exercise  What are the signs and symptoms of metabolic syndrome? Most people with metabolic syndrome do not have any signs or symptoms  You may have more thirst or hunger than usual, urinate more often, or have blurred vision or headaches  How is metabolic syndrome diagnosed? Your healthcare provider will examine you and ask about other medical conditions you may have  He may ask if you have any family members with metabolic syndrome, diabetes, obesity, or heart disease  · Blood tests: These are used to check your glucose and cholesterol levels  · Oral glucose tolerance test:  Your blood sugar level is tested after you fast for 8 hours, then again after you are given a glucose drink  The test measures how high your blood sugar level rises from the glucose drink  How is metabolic syndrome treated? · Cholesterol medicine: This type of medicine is given to help decrease (lower) the amount of cholesterol (fat) in your blood  Cholesterol medicine works best if you also exercise and eat a healthy diet that is low in certain kinds of fats  Some cholesterol medicines may cause liver problems  You may need to have blood taken for tests while using this medicine  · Blood pressure medicine: This is given to lower your blood pressure  A controlled blood pressure helps protect your organs, such as your heart, lungs, brain, and kidneys  Take your blood pressure medicine exactly as directed  · Hypoglycemic medicine: This medicine may be given to decrease the amount of sugar in your blood  Hypoglycemic medicine helps your body move the sugar to your cells, where it is needed for energy  What are the risks of metabolic syndrome? If untreated, metabolic syndrome increases your risk of heart disease, stroke, and diabetes  These conditions lead to life-threatening illness     How can I manage metabolic syndrome? · Maintain a healthy weight:  Weight loss helps lower cholesterol, triglycerides, blood pressure, and blood glucose levels  It can also raise HDL (good cholesterol)  Ask your healthcare provider how much you should weigh  Ask him to help you create a weight loss plan if you are overweight  · Eat a variety of healthy foods:  Healthy foods include fruits, vegetables, whole-grain breads, low-fat dairy products, beans, lean meats, and fish  Eat foods that are low in fat and sodium (salt)  A dietitian can help you plan healthy meals  · Exercise:  Ask your healthcare provider to help you create an exercise plan  Exercise can help lower your blood pressure, cholesterol, and blood sugar levels  Exercise can also help raise your HDL level and help you to lose weight  Get at least 30 minutes of exercise, 5 days each week  · Check your blood pressure as directed: You may be asked to keep a record of your blood pressure and bring it with you to follow-up visits  Ask your healthcare provider what your blood pressure should be and how to check it  · Limit alcohol:  Women should limit alcohol to 1 drink a day  Men should limit alcohol to 2 drinks a day  A drink of alcohol is 12 ounces of beer, 5 ounces of wine, or 1½ ounces of liquor  · Do not smoke: If you smoke, it is never too late to quit  Smoking further increases your risk for heart disease and stroke  Ask your healthcare provider for information if you need help quitting  When should I contact my healthcare provider? · You have more thirst or hunger than usual      · You urinate more frequently  · You have blurred vision  · You have questions or concerns about your condition or care  When should I seek immediate care or call 911? · Your blood pressure is higher than your healthcare provider told you it should be  · You have chest pain or discomfort that spreads to your arms, jaw, or back      · You have a severe headache or dizziness  · You have trouble thinking, speaking, or understanding others  CARE AGREEMENT:   You have the right to help plan your care  Learn about your health condition and how it may be treated  Discuss treatment options with your caregivers to decide what care you want to receive  You always have the right to refuse treatment  The above information is an  only  It is not intended as medical advice for individual conditions or treatments  Talk to your doctor, nurse or pharmacist before following any medical regimen to see if it is safe and effective for you  © 2017 [address] [employer]  Information is for End User's use only and may not be sold, redistributed or otherwise used for commercial purposes  All illustrations and images included in CareNotes® are the copyrighted property of A D A M , Inc  or [employer]  Valproic Acid (By mouth)   Valproic Acid (frandy-PROE-ik AS-id)  Treats seizures  Also treats bipolar disorder and helps prevent migraine headaches  Brand Name(s): Depakene, Depakote Sprinkles, Stavzor   There may be other brand names for this medicine  When This Medicine Should Not Be Used: This medicine is not right for everyone  Do not use it if you had an allergic reaction to divalproex, sodium valproate, or valproic acid  Do not use it if you are pregnant, or if you have liver disease or certain genetic disorders (such as urea cycle or mitochondrial disorder)  How to Use This Medicine:   Delayed Release Capsule, Liquid Filled Capsule, Liquid  · Take your medicine as directed  Your dose may need to be changed several times to find what works best for you  · You may take this medicine with food to decrease stomach upset  · Capsule: Swallow it whole  Do not crush, break, or chew it  · Oral liquid: Measure the oral liquid medicine with a marked measuring spoon, oral syringe, or medicine cup    · This medicine should come with a Medication Guide  Ask your pharmacist for a copy if you do not have one  · Missed dose: Take a dose as soon as you remember  If it is almost time for your next dose, wait until then and take a regular dose  Do not take extra medicine to make up for a missed dose  · Store the medicine in a closed container at room temperature, away from heat, moisture, and direct light  Drugs and Foods to Avoid:   Ask your doctor or pharmacist before using any other medicine, including over-the-counter medicines, vitamins, and herbal products  · Some medicines can affect how valproic acid works  Tell your doctor if you are using any of the following:   ¨ Aspirin, rifampin, rufinamide, tolbutamide, zidovudine  ¨ Carbapenem antibiotic (including ertapenem, imipenem, meropenem)  ¨ Other seizure medicine (including carbamazepine, felbamate, phenobarbital, phenytoin, primidone)  ¨ Blood thinner (including warfarin)  · Alcohol, narcotic pain relievers, or sleeping pills may cause you to feel more lightheaded, dizzy, or faint when used together with this medicine  Warnings While Using This Medicine:   · It is not safe to take this medicine during pregnancy  It could harm an unborn baby  Tell your doctor right away if you become pregnant  · Tell your doctor if you are breastfeeding, or if you have kidney disease, blood disease, or pancreas problems  · This medicine can increase depression and thoughts of suicide  Tell your doctor if you have a history of depression or mental health problems  · This medicine may cause the following problems:  ¨ Liver problems  ¨ Pancreatitis  ¨ Hyperammonemic encephalopathy (too much ammonia in your blood)  ¨ Thrombocytopenia (decrease in blood cells that affect clotting)  ¨ Hypothermia (low body temperature)  ¨ Drug reaction with eosinophilia and systemic symptoms (DRESS), which may damage organs such as the liver, kidney, or heart  · This medicine may make you dizzy or drowsy   Do not drive or do anything else that could be dangerous until you know how this medicine affects you  · Do not stop using this medicine suddenly  Your doctor will need to slowly decrease your dose before you stop it completely  · Tell any doctor or dentist who treats you that you are using this medicine  This medicine may affect certain medical test results  · Your doctor will check your progress and the effects of this medicine at regular visits  Keep all appointments  · Keep all medicine out of the reach of children  Never share your medicine with anyone  Possible Side Effects While Using This Medicine:   Call your doctor right away if you notice any of these side effects:  · Allergic reaction: Itching or hives, swelling in your face or hands, swelling or tingling in your mouth or throat, chest tightness, trouble breathing  · Blistering, peeling, or red skin rash  · Confusion, problems with memory, unusual drowsiness, clumsiness  · Dark urine or pale stools, loss of appetite, stomach pain, yellow skin or eyes  · Fever, rash, swollen glands in the neck, armpits, or groin  · Sudden and severe stomach pain, nausea, vomiting  · Thoughts of hurting yourself, depression, unusual changes in behavior or moods  · Unusual bleeding, bruising, or weakness  If you notice these less serious side effects, talk with your doctor:   · Diarrhea, mild stomach pain or upset  · Hair loss  · Tiredness, sleepiness  · Trouble sleeping, tremor  · Vision changes, dizziness, headache  If you notice other side effects that you think are caused by this medicine, tell your doctor  Call your doctor for medical advice about side effects  You may report side effects to FDA at 2-210-FDA-5887  © 2017 [address] [employer] Information is for End User's use only and may not be sold, redistributed or otherwise used for commercial purposes  The above information is an  only   It is not intended as medical advice for individual conditions or treatments  Talk to your doctor, nurse or pharmacist before following any medical regimen to see if it is safe and effective for you  Lamotrigine (By mouth)   Lamotrigine (la-MAG-tri-jeen)  Treats seizures and bipolar disorder  Brand Name(s): LaMICtal, LaMICtal CD, LaMICtal ODT, LaMICtal ODT Patient Titration Kit Blue, LaMICtal ODT Patient Titration Kit Soha Ulises, LaMICtal ODT Patient Titration Kit Orange, [employer] Kit Green, [employer], LaMICtal XR, LaMICtal XR Patient Titration Kit Blue, LaMICtal XR Patient Titration Kit Green, LaMICtal XR Patient Titration Kit Orange   There may be other brand names for this medicine  When This Medicine Should Not Be Used: This medicine is not right for everyone  Do not use it if you had an allergic reaction to lamotrigine  How to Use This Medicine:   Tablet, Chewable Tablet, Dissolving Tablet, Long Acting Tablet  · Take your medicine as directed  Your dose may need to be changed several times to find what works best for you  · Chewable tablet: You may swallow the tablet whole, or you may chew it and then swallow a small amount of water or diluted fruit juice  You may also dissolve the chewable tablet  To do this, put about 1 teaspoon of water or juice in a glass, drop in the tablet, let it sit for about 1 minute so it dissolves, swirl the glass to mix, and then swallow the entire mixture  · Disintegrating tablet: Make sure your hands are dry before you handle the tablet  Place the tablet on your tongue  Move the tablet around in your mouth so it dissolves  · Regular tablet: Swallow the tablet whole  You may break or crush the tablet if your doctor tells you to, but the medicine might leave a bitter taste in your mouth  · Swallow the extended-release tablet whole  Do not crush, break, or chew it  · This medicine should come with a Medication Guide  Ask your pharmacist for a copy if you do not have one  · Missed dose:  Take a dose as soon as you remember  If it is almost time for your next dose, wait until then and take a regular dose  Do not take extra medicine to make up for a missed dose  · Store the medicine in a closed container at room temperature, away from heat, moisture, and direct light  Do not use if the blister pack is torn or broken  Drugs and Foods to Avoid:   Ask your doctor or pharmacist before using any other medicine, including over-the-counter medicines, vitamins, and herbal products  · Some foods and medicines can affect how lamotrigine works  Tell your doctor if you are using any of the following:  ¨ Atazanavir, ritonavir, lopinavir, rifampin  ¨ Birth control pills  ¨ Other medicine to control seizures, including carbamazepine, divalproex, oxcarbazepine, phenobarbital, phenytoin, primidone, valproic acid, valproate  Warnings While Using This Medicine:   · Tell your doctor if you are pregnant or breastfeeding, or if you have kidney disease, liver disease, or a history of depression  Tell your doctor if you have had a rash or an allergic reaction to other seizure medicine  · This medicine may cause the following problems:  ¨ Drug reaction with eosinophilia and systemic symptoms (DRESS), which may damage organs such as the liver, kidney, or heart  ¨ Increased risk of thoughts of suicide or other serious mood changes  ¨ Low blood cell counts, which may cause bleeding problems or increase your risk for infection  ¨ Meningitis  ¨ Severe skin rash that can lead to hospitalization or death  · This medicine may make you dizzy or drowsy  Do not drive or do anything else that could be dangerous until you know how this medicine affects you  · Do not stop using this medicine suddenly  Your doctor will need to slowly decrease your dose before you stop it completely  · Your doctor will do lab tests at regular visits to check on the effects of this medicine  Keep all appointments  · Keep all medicine out of the reach of children   Never share your medicine with anyone  Possible Side Effects While Using This Medicine:   Call your doctor right away if you notice any of these side effects:  · Allergic reaction: Itching or hives, swelling in your face or hands, swelling or tingling in your mouth or throat, chest tightness, trouble breathing  · Blistering, peeling, or red skin rash  · Feeling depressed, irritable, or restless  · Fever, chills, cough, sore throat, and body aches  · Fever, skin rash, or swollen glands in your armpits, neck, or groin  · Painful sores in your mouth or around your eyes  · Stiff neck or back, headache, fever, nausea, vomiting  · Thoughts of hurting yourself, other unusual thoughts or behaviors  · Unusual bleeding, bruising, or weakness  · Yellow skin or eyes  If you notice these less serious side effects, talk with your doctor:   · Blurred vision, double vision, or other vision problems  · Clumsiness, dizziness, sleepiness, problems with balance or walking  · Nausea, vomiting  · Runny or stuffy nose  If you notice other side effects that you think are caused by this medicine, tell your doctor  Call your doctor for medical advice about side effects  You may report side effects to FDA at 8-016-FDA-2311  © 2017 [facility] Information is for End User's use only and may not be sold, redistributed or otherwise used for commercial purposes  The above information is an  only  It is not intended as medical advice for individual conditions or treatments  Talk to your doctor, nurse or pharmacist before following any medical regimen to see if it is safe and effective for you  Lorazepam (By mouth)   Lorazepam (mnn-ZB-t-racheal)  Treats anxiety  Brand Name(s): Ativan, LORazepam Intensol   There may be other brand names for this medicine  When This Medicine Should Not Be Used: This medicine is not right for everyone   Do not use it if you had an allergic reaction to lorazepam or similar medicines, or you are pregnant or breastfeeding, or you have acute narrow-angle glaucoma  How to Use This Medicine:   Liquid, Tablet  · Take your medicine as directed  Your dose may need to be changed several times to find what works best for you  · Oral liquid:   ¨ Measure the oral liquid medicine with a marked measuring spoon, oral syringe, or medicine cup  ¨ Mix the medicine with water, juice, soda, applesauce, or pudding  Drink or eat the mixture right away  Do not store it for later use  · This medicine should come with a Medication Guide  Ask your pharmacist for a copy if you do not have one  · Missed dose: Take a dose as soon as you remember  If you are more than 1 hour late, skip the missed dose and wait until it is time for your next dose  Do not use extra medicine to make up for a missed dose  ·   ¨ Oral liquid: Refrigerate the oral liquid  Throw away an opened bottle after 90 days  ¨ Tablets: Store the medicine in a closed container at room temperature, away from heat, moisture, and direct light  Drugs and Foods to Avoid:   Ask your doctor or pharmacist before using any other medicine, including over-the-counter medicines, vitamins, and herbal products  · Some medicines can affect how lorazepam works  Tell your doctor if you are using any of the following:   ¨ Aminophylline, clozapine, probenecid, theophylline, valproate  ¨ Medicine to treat depression or mental health problems  ¨ Medicine to treat seizures  · Do not drink alcohol while you are using this medicine  · Tell your doctor if you use anything else that makes you sleepy  Some examples are allergy medicine, narcotic pain medicine, and alcohol  Warnings While Using This Medicine:   · It is not safe to take this medicine during pregnancy  It could harm an unborn baby  Tell your doctor right away if you become pregnant    · Tell your doctor if you have kidney disease, liver disease, lung or breathing problems (such as COPD, sleep apnea), or a history of drug or alcohol abuse, depression, or seizures  · This medicine can be habit-forming  Do not use more than your prescribed dose  Call your doctor if you think your medicine is not working  · Do not stop using this medicine suddenly  Your doctor will need to slowly decrease your dose before you stop it completely  · This medicine may make you drowsy  Do not drive or do anything else that could be dangerous until you know how this medicine affects you  · Your doctor will do lab tests at regular visits to check on the effects of this medicine  Keep all appointments  · Keep all medicine out of the reach of children  Never share your medicine with anyone  Possible Side Effects While Using This Medicine:   Call your doctor right away if you notice any of these side effects:  · Allergic reaction: Itching or hives, swelling in your face or hands, swelling or tingling in your mouth or throat, chest tightness, trouble breathing  · Confusion, unusual mood or behavior, thoughts of hurting yourself  · Seizures  · Severe drowsiness or weakness, slow heartbeat, trouble breathing  · Worsening of depression  If you notice these less serious side effects, talk with your doctor:   · Dizziness, clumsiness  If you notice other side effects that you think are caused by this medicine, tell your doctor  Call your doctor for medical advice about side effects  You may report side effects to FDA at 5-491-FDA-7833  © 2017 [address] [employer] Information is for End User's use only and may not be sold, redistributed or otherwise used for commercial purposes  The above information is an  only  It is not intended as medical advice for individual conditions or treatments  Talk to your doctor, nurse or pharmacist before following any medical regimen to see if it is safe and effective for you        Quetiapine (By mouth)   Quetiapine Fumarate (hei-NKU-f-peen FUE-ma-rate)  Treats schizophrenia, bipolar disorder, or depression  Brand Name(s): SEROquel, SEROquel XR, Seroquel XR 14-Day Sample Kit   There may be other brand names for this medicine  When This Medicine Should Not Be Used: This medicine is not right for everyone  Do not use if you had an allergic reaction to quetiapine  How to Use This Medicine:   Tablet, Long Acting Tablet  · Take your medicine as directed  Your dose may need to be changed several times to find what works best for you  You need to start with a low dose, even if you have used this medicine before  · Your doctor may tell you to take the medicine at bedtime, because quetiapine can make you sleepy  · Extended-release tablets: Take this medicine either without food or with a light snack (approximately 300 calories)  · Swallow the extended-release tablet whole  Do not crush, break, or chew it  · This medicine should come with a Medication Guide  Ask your pharmacist for a copy if you do not have one  · Missed dose: Take a dose as soon as you remember  If it is almost time for your next dose, wait until then and take a regular dose  Do not take extra medicine to make up for a missed dose  · Store the medicine in a closed container at room temperature, away from heat, moisture, and direct light  Drugs and Foods to Avoid:   Ask your doctor or pharmacist before using any other medicine, including over-the-counter medicines, vitamins, and herbal products  · Some medicines can affect how quetiapine works   Tell your doctor if you are using any of the following:  ¨ Levodopa, methadone, nefazodone, rifampin, Tara's wort  ¨ Blood pressure medicine  ¨ Medicine for heart rhythm problems (including amiodarone, procainamide, quinidine, sotalol)  ¨ Medicine for seizures (including carbamazepine, phenobarbital, phenytoin)  ¨ Medicine to treat an infection (including gatifloxacin, itraconazole, ketoconazole, moxifloxacin, pentamidine)  ¨ Medicine to treat HIV/AIDS (including indinavir, ritonavir)  ¨ Other medicine to treat mental health problems (including chlorpromazine, thioridazine, ziprasidone)  · Tell your doctor if you use anything else that makes you sleepy  Some examples are allergy medicine, narcotic pain medicine, and alcohol  · Do not drink alcohol while you are using this medicine  Warnings While Using This Medicine:   · Tell your doctor if you are pregnant or breastfeeding, or if you have liver disease, breast cancer, diabetes, underactive thyroid, or a history of seizures or neuroleptic malignant syndrome (NMS)  Tell your doctor if you have any kind of blood vessel or heart problems, including low or high blood pressure, heart failure, heart rhythm problems (QT prolongation, slow heartbeat), high cholesterol, or a history of heart attack or stroke  · This medicine may cause the following problems:  ¨ Neuroleptic malignant syndrome (a nerve and muscle problem)  ¨ High blood sugar, cholesterol, or triglyceride levels  ¨ Tardive dyskinesia (a muscle problem that may become permanent)  ¨ Changes in blood pressure, especially in children  ¨ Heart rhythm changes  · This medicine may cause depression or thoughts of suicide  Make sure family members know about this  Always tell your doctor about any behavior changes, depression, intense feelings, or thoughts of hurting yourself or others  · This medicine may make you dizzy or drowsy, or may cause trouble with thinking or controlling body movements, which may lead to falls, fractures or other injuries  Do not drive or do anything that could be dangerous until you know how this medicine affects you  You may also feel lightheaded when getting up suddenly from a lying or sitting position, so stand up slowly  · This medicine may make you bleed, bruise, or get infections more easily  Take precautions to prevent illness and injury  Wash your hands often  · This medicine may make it more difficult for your body to cool down   Be careful to not become overheated during exercise or hot weather, because you could have heat stroke  · Do not stop using this medicine suddenly  Your doctor will need to slowly decrease your dose before you stop it completely  · Your doctor will do lab tests at regular visits to check on the effects of this medicine  Keep all appointments  You may also need to have your eyes tested on a regular basis  · Tell any doctor or dentist who treats you that you are using this medicine  This medicine may affect certain medical test results  · Keep all medicine out of the reach of children  Never share your medicine with anyone  Possible Side Effects While Using This Medicine:   Call your doctor right away if you notice any of these side effects:  · Allergic reaction: Itching or hives, swelling in your face or hands, swelling or tingling in your mouth or throat, chest tightness, trouble breathing  · Changes in mood or behavior, agitation, anxiety, restlessness, or thoughts of hurting yourself or others  · Constant muscle movement that you cannot control (often in your lips, tongue, jaw, arms, or legs)  · Fast, slow, pounding, or uneven heartbeat  · Fever, chills, cough, sore throat, and body aches  · Fever, sweating, confusion, uneven heartbeat, muscle stiffness  · Increase in how much or how often you urinate, increased thirst, increased hunger, or weakness  · Lightheadedness, dizziness, fainting, or clumsiness  · Seizures  · Vision changes  If you notice these less serious side effects, talk with your doctor:   · Constipation, vomiting, nausea, dry mouth  · Headache  · Tiredness, dizziness, or sleepiness  · Trouble swallowing  · Weight gain  If you notice other side effects that you think are caused by this medicine, tell your doctor  Call your doctor for medical advice about side effects   You may report side effects to FDA at 7-331-FDA-6161  © 2017 [address] [employer] Information is for End User's use only and may not be sold, redistributed or otherwise used for commercial purposes  The above information is an  only  It is not intended as medical advice for individual conditions or treatments  Talk to your doctor, nurse or pharmacist before following any medical regimen to see if it is safe and effective for you  Venlafaxine (By mouth)   Venlafaxine ([id number])  Treats depression, generalized anxiety disorder, panic disorder, and social anxiety disorder  Brand Name(s): Effexor XR   There may be other brand names for this medicine  When This Medicine Should Not Be Used: This medicine is not right for everyone  Do not use it if you had an allergic reaction to venlafaxine or desvenlafaxine succinate  How to Use This Medicine:   Long Acting Capsule, Tablet, Long Acting Tablet  · Take your medicine as directed  Your dose may need to be changed several times to find what works best for you  · It is best to take the extended-release capsule at the same time each day (either in the morning or evening)  · It is best to take this medicine with food or milk  · Swallow the extended-release capsule whole  Do not crush, break, or chew it  Do not place the capsule in a liquid  · If you cannot swallow the extended-release capsule, you may open it and pour the medicine into a small amount of soft food such as pudding, yogurt, or applesauce  Stir this mixture well and swallow it without chewing  · This medicine should come with a Medication Guide  Ask your pharmacist for a copy if you do not have one  · Missed dose: Take a dose as soon as you remember  If it is almost time for your next dose, wait until then and take a regular dose  Do not take extra medicine to make up for a missed dose  · Store the medicine in a closed container at room temperature, away from heat, moisture, and direct light    Drugs and Foods to Avoid:   Ask your doctor or pharmacist before using any other medicine, including over-the-counter medicines, vitamins, and herbal products  · Do not use this medicine if you have used an MAO inhibitor within the past 14 days  Do not take an MAO inhibitor for at least 7 days after you stop this medicine  · Some medicines can affect how venlafaxine works  Tell your doctor if you are using any of the following:   ¨ Buspirone, cimetidine, fentanyl, ketoconazole, lithium, metoprolol, mirtazapine, Tara's wort, tramadol, or tryptophan supplements  ¨ Amphetamines  ¨ Blood thinner (including warfarin)  ¨ Diuretic (water pill)  ¨ Medicine for migraine headaches  ¨ Medicine to lose weight (including phentermine)  ¨ NSAID pain or arthritis medicine (including aspirin, celecoxib, diclofenac, ibuprofen, naproxen)  ¨ Tricyclic antidepressant  · Do not drink alcohol while you are using this medicine  · Tell your doctor if you use anything else that makes you sleepy  Some examples are allergy medicine, narcotic pain medicine, and alcohol  Warnings While Using This Medicine:   · Tell your doctor if you are pregnant or breastfeeding, or if you have kidney disease, liver disease, glaucoma, heart disease, high blood pressure, or thyroid problems  Tell your doctor if you have a history of darien, seizures, heart attack, or stroke  · This medicine can increase thoughts of suicide  Tell your doctor right away if you start to feel depressed and have thoughts about hurting yourself  · This medicine may cause the following problems:   ¨ Serotonin syndrome (when used with certain medicines)  ¨ Increased cholesterol levels  ¨ Increased blood pressure  ¨ Increased risk of bleeding problems  ¨ Low sodium levels  ¨ Interstitial lung disease and eosinophilic pneumonia  · This medicine may make you dizzy or drowsy  Do not drive or do anything that could be dangerous until you know how this medicine affects you  · Do not stop using this medicine suddenly  Your doctor will need to slowly decrease your dose before you stop it completely    · Tell any doctor or dentist who treats you that you are using this medicine  This medicine may affect certain medical test results  · Your doctor will do lab tests at regular visits to check on the effects of this medicine  Keep all appointments  · Keep all medicine out of the reach of children  Never share your medicine with anyone  Possible Side Effects While Using This Medicine:   Call your doctor right away if you notice any of these side effects:  · Allergic reaction: Itching or hives, swelling in your face or hands, swelling or tingling in your mouth or throat, chest tightness, trouble breathing  · Anxiety, restlessness, fever, sweating, muscle spasms, nausea, vomiting, diarrhea, seeing or hearing things that are not there  · Blistering, peeling, red skin rash  · Chest pain, cough, trouble breathing  · Confusion, weakness, and muscle twitching  · Eye pain, vision changes, seeing halos around lights  · Fast or pounding heartbeat  · Feeling more excited or energetic than usual  · Headache, trouble concentrating, memory problems, unsteadiness  · Seizures  · Unusual behavior, thoughts of hurting yourself or others, trouble sleeping, nervousness, unusual dreams  · Unusual bleeding or bruising  If you notice these less serious side effects, talk with your doctor:   · Dry mouth  · Mild nausea, constipation, vomiting, loss of appetite, weight loss  · Sexual problems  · Sleepiness or unusual drowsiness, dizziness  If you notice other side effects that you think are caused by this medicine, tell your doctor  Call your doctor for medical advice about side effects  You may report side effects to FDA at 8-523-FDA-5580  © 2017 [address] [employer] Information is for End User's use only and may not be sold, redistributed or otherwise used for commercial purposes  The above information is an  only  It is not intended as medical advice for individual conditions or treatments   Talk to your doctor, nurse or pharmacist before following any medical regimen to see if it is safe and effective for you

## 2019-07-17 NOTE — PLAN OF CARE
Problem: Risk for Self Injury/Neglect  Goal: Treatment Goal: Remain safe during length of stay, learn and adopt new coping skills, and be free of self-injurious ideation, impulses and acts at the time of discharge  Outcome: Completed  Goal: Recognize maladaptive responses and adopt new coping mechanisms  Outcome: Completed     Problem: Anxiety  Goal: Anxiety is at manageable level  Description  Interventions:  - Assess and monitor patient's anxiety level  - Monitor for signs and symptoms of anxiety both physical and emotional (heart palpitations, chest pain, shortness of breath, headaches, nausea, feeling jumpy, restlessness, irritable, apprehensive)  - Collaborate with interdisciplinary team and initiate plan and interventions as ordered  - Winger patient to unit/surroundings  - Explain treatment plan  - Encourage participation in care  - Encourage verbalization of concerns/fears  - Identify coping mechanisms  - Assist in developing anxiety-reducing skills  - Administer/offer alternative therapies  - Limit or eliminate stimulants  Outcome: Completed     Problem: Ineffective Coping  Goal: Participates in unit activities  Description  Interventions:  - Provide therapeutic environment   - Provide required programming   - Redirect inappropriate behaviors   Outcome: Completed   Pt is planned for discharge today  Reports all identified problems have been resolved and goals met

## 2019-07-31 ENCOUNTER — HOSPITAL ENCOUNTER (EMERGENCY)
Facility: HOSPITAL | Age: 24
Discharge: HOME | End: 2019-07-31
Attending: EMERGENCY MEDICINE | Admitting: EMERGENCY MEDICINE
Payer: MEDICARE

## 2019-07-31 VITALS
SYSTOLIC BLOOD PRESSURE: 118 MMHG | BODY MASS INDEX: 26.99 KG/M2 | TEMPERATURE: 97.9 F | WEIGHT: 171.96 LBS | RESPIRATION RATE: 20 BRPM | OXYGEN SATURATION: 98 % | HEIGHT: 67 IN | DIASTOLIC BLOOD PRESSURE: 70 MMHG | HEART RATE: 98 BPM

## 2019-07-31 DIAGNOSIS — F32.A DEPRESSION: Primary | ICD-10-CM

## 2019-07-31 LAB
AMPHETAMINES SERPL QL SCN: NEGATIVE
BARBITURATES UR QL: NEGATIVE
BENZODIAZ UR QL: NEGATIVE
COCAINE UR QL: NEGATIVE
ETHANOL EXG-MCNC: 0 MG/DL
EXT PREG TEST URINE: NORMAL
EXT. CONTROL ED NAV: NORMAL
METHADONE UR QL: NEGATIVE
OPIATES UR QL SCN: NEGATIVE
PCP UR QL: NEGATIVE
THC UR QL: POSITIVE

## 2019-07-31 PROCEDURE — 82075 ASSAY OF BREATH ETHANOL: CPT | Performed by: EMERGENCY MEDICINE

## 2019-07-31 PROCEDURE — 80307 DRUG TEST PRSMV CHEM ANLYZR: CPT | Performed by: EMERGENCY MEDICINE

## 2019-07-31 PROCEDURE — 81025 URINE PREGNANCY TEST: CPT | Performed by: EMERGENCY MEDICINE

## 2019-07-31 PROCEDURE — 99284 EMERGENCY DEPT VISIT MOD MDM: CPT

## 2019-07-31 NOTE — ED NOTES
Pt presents due to increased depression and increased anxiety  Pt is A & O X 3 and was calm and cooperative at the start of assessment  Pt told her mother, e reza Kuo, that she did not want her in the room w/ her, therefore, pt's mother met w/ crisis separately to inform crisis of current issues being observed  Pt denies any SI/HI or thoughts of harm  Pt denies any AH, VH, or delusions  Pt states her appetite and sleep are stable  Pt is endorsing increased depression and anxiety and states she has refused her meds at times over the past 3 days because she feels they are not helping  Pt was able to identify that her depression and anxiety are most likely increased due to her med refusals and rather than refuse her meds she should have called her dr to discuss adjusting her meds if needed  Pt became rude and disrespectful when told it does not seem that she is meeting I/P psych admission criteria at this time  Crisis attempted to discuss possible placement at a crisis residential rather than an [address] [place] [address] admission, however, pt did not want to hear any options other than what she wants which is I/P psych here at [facility]  Pt was informed her insurance will probably deny I/P admission because there is no criteria at this time, however, an attempt to pre-cert will be made if that is what she wants  Pt continued to be disrespectful and was cursing and being beligerent, at which time, pt was told the assessment was being ended until she could compose herself so that we could discuss ways to help her rather than her becoming more and more agitated  Pt's mother informed crisis pt has not made any suicidal staements or thoughts to hurt herself and has not acted out in any dangerous manner  Pt's mother, who has never been opposed to I/P psych admission in the past, believes that this is simply an attention seeking situation   Pt has an appt w/ [address] on Friday that she has made several statements to her mother that she does not want to go to  Per pt's mother, pt stated over the weekend that she would do anything she has to to avoid going to the Friday appt, which is why her mother believes she is here seeking I/P admission

## 2019-07-31 NOTE — ED PROVIDER NOTES
History  Chief Complaint   Patient presents with    Psychiatric Evaluation     depression  Patient reports not taking medications on a regular basis  Patient is a 28-year-old female with history of autism anxiety depression and prior traumatic brain injury as well as OCD and ODT presents the emergency department seeking psychiatric evaluation due to depression and not taking meds as scheduled  Patient denies any suicidal homicidal ideation at this time but does strongly wish to sign herself in for inpatient psychiatric treatment  History provided by:  Patient  Psychiatric Evaluation   Presenting symptoms: depression    Presenting symptoms: no suicidal thoughts    Degree of incapacity (severity): Moderate  Onset quality:  Gradual  Duration:  1 week  Timing:  Constant  Progression:  Worsening  Chronicity:  Recurrent  Associated symptoms: anxiety    Associated symptoms: no abdominal pain, no appetite change, no chest pain, no fatigue and no headaches        Prior to Admission Medications   Prescriptions Last Dose Informant Patient Reported? Taking?    Cholecalciferol (VITAMIN D3) 50583 units TABS   Yes Yes   Sig: Take by mouth   LORazepam (ATIVAN) 1 mg tablet   Yes Yes   Sig: Take 1 mg by mouth 3 (three) times a day   QUEtiapine (SEROquel) 400 MG tablet   No Yes   Sig: Take 1 tablet (400 mg total) by mouth daily at bedtime for 30 days   acetaminophen (TYLENOL) 325 mg tablet   Yes Yes   Sig: Take 650 mg by mouth every 6 (six) hours as needed for mild pain   divalproex sodium (DEPAKOTE ER) 500 mg 24 hr tablet   No Yes   Sig: Take 2 tablets (1,000 mg total) by mouth daily at bedtime for 30 days   docusate sodium (COLACE) 100 mg capsule   Yes Yes   Sig: Take 100 mg by mouth 2 (two) times a day   fexofenadine (ALLEGRA) 180 MG tablet   Yes Yes   Sig: Take 180 mg by mouth daily   lamoTRIgine (LaMICtal) 100 mg tablet   Yes Yes   Sig: Take 100 mg by mouth 2 (two) times a day   norethindrone-ethinyl estradiol-iron (Perfecto Laser FE1 5/30) 1 5-30 MG-MCG tablet   Yes Yes   Sig: Take 1 tablet by mouth daily   venlafaxine (EFFEXOR-XR) 75 mg 24 hr capsule   No Yes   Sig: Take 1 capsule (75 mg total) by mouth daily for 30 days      Facility-Administered Medications: None       Past Medical History:   Diagnosis Date    Adjustment disorder     Anxiety     Autism spectrum disorder     Bipolar disorder ([place] [address] )     Depression     Fracture of skull ([place] [address] )     Obsessive-compulsive disorder     Oppositional defiant disorder     Seizures ([place] [address] )     Subarachnoid hemorrhage ([place] [address] )     Traumatic brain injury ([place] [address] )        History reviewed  No pertinent surgical history  Family History   Adopted: Yes   Family history unknown: Yes     I have reviewed and agree with the history as documented  Social History     Tobacco Use    Smoking status: Never Smoker    Smokeless tobacco: Never Used   Substance Use Topics    Alcohol use: No    Drug use: No        Review of Systems   Constitutional: Negative for activity change, appetite change, chills, fatigue and fever  HENT: Negative for congestion, ear pain, rhinorrhea and sore throat  Eyes: Negative for discharge, redness and visual disturbance  Respiratory: Negative for cough, chest tightness, shortness of breath and wheezing  Cardiovascular: Negative for chest pain and palpitations  Gastrointestinal: Negative for abdominal pain, constipation, diarrhea, nausea and vomiting  Endocrine: Negative for polydipsia and polyuria  Genitourinary: Negative for difficulty urinating, dysuria, frequency, hematuria and urgency  Musculoskeletal: Negative for arthralgias and myalgias  Skin: Negative for color change, pallor and rash  Neurological: Negative for dizziness, weakness, light-headedness, numbness and headaches  Hematological: Negative for adenopathy  Does not bruise/bleed easily  Psychiatric/Behavioral: Positive for dysphoric mood  Negative for suicidal ideas   The patient is nervous/anxious  All other systems reviewed and are negative  Physical Exam  Physical Exam   Constitutional: She is oriented to person, place, and time  She appears well-developed and well-nourished  HENT:   Head: Normocephalic and atraumatic  Right Ear: External ear normal    Left Ear: External ear normal    Nose: Nose normal    Mouth/Throat: Oropharynx is clear and moist    Eyes: Pupils are equal, round, and reactive to light  Conjunctivae and EOM are normal    Neck: Normal range of motion  Neck supple  Cardiovascular: Normal rate, regular rhythm, normal heart sounds and intact distal pulses  Pulmonary/Chest: Effort normal and breath sounds normal  No respiratory distress  She has no wheezes  She has no rales  She exhibits no tenderness  Abdominal: Soft  Bowel sounds are normal  She exhibits no distension  There is no tenderness  There is no guarding  Musculoskeletal: Normal range of motion  Neurological: She is alert and oriented to person, place, and time  No cranial nerve deficit or sensory deficit  Skin: Skin is warm and dry  Psychiatric: She has a normal mood and affect  Nursing note and vitals reviewed        Vital Signs  ED Triage Vitals [07/31/19 1435]   Temperature Pulse Respirations Blood Pressure SpO2   97 9 °F (36 6 °C) (!) 119 16 121/77 98 %      Temp Source Heart Rate Source Patient Position - Orthostatic VS BP Location FiO2 (%)   Temporal Monitor Sitting Left arm --      Pain Score       No Pain           Vitals:    07/31/19 1435   BP: 121/77   Pulse: (!) 119   Patient Position - Orthostatic VS: Sitting         Visual Acuity      ED Medications  Medications - No data to display    Diagnostic Studies  Results Reviewed     Procedure Component Value Units Date/Time    Rapid drug screen, urine [[id number]]  (Abnormal) Collected:  07/31/19 1524    Lab Status:  Final result Specimen:  Urine, Clean Catch Updated:  07/31/19 1559     Amph/Meth UR Negative     Barbiturate Ur Negative     Benzodiazepine Urine Negative     Cocaine Urine Negative     Methadone Urine Negative     Opiate Urine Negative     PCP Ur Negative     THC Urine Positive    Narrative:       Presumptive report  If requested, specimen will be sent to reference lab for confirmation  FOR MEDICAL PURPOSES ONLY  IF CONFIRMATION NEEDED PLEASE CONTACT THE LAB WITHIN 5 DAYS  Drug Screen Cutoff Levels:  AMPHETAMINE/METHAMPHETAMINES  1000 ng/mL  BARBITURATES     200 ng/mL  BENZODIAZEPINES     200 ng/mL  COCAINE      300 ng/mL  METHADONE      300 ng/mL  OPIATES      300 ng/mL  PHENCYCLIDINE     25 ng/mL  THC       50 ng/mL      POCT pregnancy, urine [[id number]]  (Normal) Resulted:  07/31/19 1510    Lab Status:  Edited Result - FINAL Updated:  07/31/19 1518     EXT PREG TEST UR (Ref: Negative) negative [id number]     Control valid    POCT alcohol breath test [[id number]]  (Normal) Resulted:  07/31/19 1508    Lab Status:  Final result Updated:  07/31/19 1517     EXTBreath Alcohol 0 000                 No orders to display              Procedures  Procedures       ED Course        1530 - patient is medically cleared for crisis evaluation and inpatient psychiatric treatment if indicated  5:22 PM  Patient was seen and evaluated by crisis worker in the emergency department there are no grounds present for involuntary commitment on my evaluation crisis worker reports that she discussed case with psychiatrist and is felt that patient does not meet criteria for inpatient psychiatric treatment at this time this recommendation was passed on to patient and family who are in agreement with the plan that has been established for outpatient psychiatric follow-up with the patient's primary psychiatrist   Patient and family wish for plan of return home at this time and there are no grounds for involuntary commitment        Patient understands the need for prompt follow-up with psychiatrist   Return precautions and anticipatory guidance discussed  MDM  Number of Diagnoses or Management Options  Depression: new and requires workup  Diagnosis management comments: Patient was medically cleared in the ED crisis recommends discharge with outpatient psychiatric follow-up there are no grounds for 302  Return precautions and anticipatory guidance discussed  Amount and/or Complexity of Data Reviewed  Clinical lab tests: ordered and reviewed  Tests in the medicine section of CPT®: ordered and reviewed  Decide to obtain previous medical records or to obtain history from someone other than the patient: yes  Review and summarize past medical records: yes    Risk of Complications, Morbidity, and/or Mortality  Presenting problems: moderate  Diagnostic procedures: low  Management options: low    Patient Progress  Patient progress: stable      Disposition  Final diagnoses:   Depression     Time reflects when diagnosis was documented in both MDM as applicable and the Disposition within this note     Time User Action Codes Description Comment    7/31/2019  2:50 PM Danielle Fernández Add [F32 9] Depression       ED Disposition     ED Disposition Condition Date/Time Comment    Discharge Stable Wed Jul 31, 2019  5:18 PM Mingo Alvarado discharge to home/self care  MD Documentation      Most Recent Value   Sending MD  Dr Dulce Garces MD      Follow-up Information     Follow up With Specialties Details Why Felicity Hill MD Psychiatry Schedule an appointment as soon as possible for a visit in 1 day  EULALIA [address]  [address]            Patient's Medications   Discharge Prescriptions    No medications on file     No discharge procedures on file      ED Provider  Electronically Signed by           Neelima Mendoza DO  07/31/19 1567

## 2019-08-01 ENCOUNTER — HOSPITAL ENCOUNTER (EMERGENCY)
Facility: HOSPITAL | Age: 24
Discharge: HOME | End: 2019-08-01
Admitting: EMERGENCY MEDICINE
Payer: MEDICARE

## 2019-08-01 VITALS
BODY MASS INDEX: 29.52 KG/M2 | OXYGEN SATURATION: 98 % | HEIGHT: 64 IN | SYSTOLIC BLOOD PRESSURE: 104 MMHG | DIASTOLIC BLOOD PRESSURE: 68 MMHG | TEMPERATURE: 98 F | HEART RATE: 98 BPM | RESPIRATION RATE: 20 BRPM

## 2019-08-01 DIAGNOSIS — R45.1 AGITATION: ICD-10-CM

## 2019-08-01 DIAGNOSIS — F32.A DEPRESSION: Primary | ICD-10-CM

## 2019-08-01 LAB
AMPHETAMINES SERPL QL SCN: NEGATIVE
BACTERIA UR QL AUTO: ABNORMAL /HPF
BARBITURATES UR QL: NEGATIVE
BENZODIAZ UR QL: NEGATIVE
BILIRUB UR QL STRIP: ABNORMAL
CLARITY UR: ABNORMAL
COCAINE UR QL: NEGATIVE
COLOR UR: YELLOW
ETHANOL EXG-MCNC: 0 MG/DL
EXT PREG TEST URINE: NORMAL
EXT. CONTROL ED NAV: NORMAL
GLUCOSE UR STRIP-MCNC: NEGATIVE MG/DL
HGB UR QL STRIP.AUTO: ABNORMAL
KETONES UR STRIP-MCNC: ABNORMAL MG/DL
LEUKOCYTE ESTERASE UR QL STRIP: NEGATIVE
METHADONE UR QL: NEGATIVE
MUCOUS THREADS UR QL AUTO: ABNORMAL
NITRITE UR QL STRIP: NEGATIVE
NON-SQ EPI CELLS URNS QL MICRO: ABNORMAL /HPF
OPIATES UR QL SCN: NEGATIVE
PCP UR QL: NEGATIVE
PH UR STRIP.AUTO: 5.5 [PH]
PROT UR STRIP-MCNC: ABNORMAL MG/DL
RBC #/AREA URNS AUTO: ABNORMAL /HPF
SP GR UR STRIP.AUTO: >=1.03 (ref 1–1.03)
THC UR QL: POSITIVE
UROBILINOGEN UR QL STRIP.AUTO: 1 E.U./DL
WBC #/AREA URNS AUTO: ABNORMAL /HPF

## 2019-08-01 PROCEDURE — 82075 ASSAY OF BREATH ETHANOL: CPT | Performed by: EMERGENCY MEDICINE

## 2019-08-01 PROCEDURE — 81025 URINE PREGNANCY TEST: CPT | Performed by: EMERGENCY MEDICINE

## 2019-08-01 PROCEDURE — 81001 URINALYSIS AUTO W/SCOPE: CPT | Performed by: EMERGENCY MEDICINE

## 2019-08-01 PROCEDURE — 99284 EMERGENCY DEPT VISIT MOD MDM: CPT

## 2019-08-01 PROCEDURE — 80307 DRUG TEST PRSMV CHEM ANLYZR: CPT | Performed by: EMERGENCY MEDICINE

## 2019-08-01 NOTE — ED PROVIDER NOTES
History  No chief complaint on file  Patient is a 57-year-old female with a history of traumatic brain injury as well as bipolar disorder and autism who is in emerged department with a behavior disorder  Patient had been emerged department yesterday ambulated with a crisis and ultimately sent home  Today patient says that she feels her medications not working AP she is becoming increasingly anxious and angry and would like to be re-evaluated  Patient denies any homicidal or suicidal ideation  She has no auditory or visual hallucination  Patient has no fever chills or other medical problems she is here for further evaluation          Prior to Admission Medications   Prescriptions Last Dose Informant Patient Reported? Taking?    Cholecalciferol (VITAMIN D3) 86945 units TABS   Yes No   Sig: Take by mouth   LORazepam (ATIVAN) 1 mg tablet   Yes No   Sig: Take 1 mg by mouth 3 (three) times a day   QUEtiapine (SEROquel) 400 MG tablet   No No   Sig: Take 1 tablet (400 mg total) by mouth daily at bedtime for 30 days   acetaminophen (TYLENOL) 325 mg tablet   Yes No   Sig: Take 650 mg by mouth every 6 (six) hours as needed for mild pain   divalproex sodium (DEPAKOTE ER) 500 mg 24 hr tablet   No No   Sig: Take 2 tablets (1,000 mg total) by mouth daily at bedtime for 30 days   docusate sodium (COLACE) 100 mg capsule   Yes No   Sig: Take 100 mg by mouth 2 (two) times a day   fexofenadine (ALLEGRA) 180 MG tablet   Yes No   Sig: Take 180 mg by mouth daily   lamoTRIgine (LaMICtal) 100 mg tablet   Yes No   Sig: Take 100 mg by mouth 2 (two) times a day   norethindrone-ethinyl estradiol-iron (MICROGESTIN FE1 5/30) 1 5-30 MG-MCG tablet   Yes No   Sig: Take 1 tablet by mouth daily   venlafaxine (EFFEXOR-XR) 75 mg 24 hr capsule   No No   Sig: Take 1 capsule (75 mg total) by mouth daily for 30 days      Facility-Administered Medications: None       Past Medical History:   Diagnosis Date    Adjustment disorder     Anxiety     Autism spectrum disorder     Bipolar disorder ([facility] [place] [facility] )     Depression     Fracture of skull ([facility])     Obsessive-compulsive disorder     Oppositional defiant disorder     Seizures ([facility] [place] [facility] )     Subarachnoid hemorrhage ([facility] [place] [facility] )     Traumatic brain injury ([place] [address] )        No past surgical history on file  Family History   Adopted: Yes   Family history unknown: Yes     I have reviewed and agree with the history as documented  Social History     Tobacco Use    Smoking status: Never Smoker    Smokeless tobacco: Never Used   Substance Use Topics    Alcohol use: No    Drug use: No        Review of Systems   Constitutional: Negative for chills, fatigue, fever and unexpected weight change  HENT: Negative for congestion and nosebleeds  Eyes: Negative for visual disturbance  Respiratory: Negative for chest tightness and shortness of breath  Cardiovascular: Negative for chest pain, palpitations and leg swelling  Gastrointestinal: Negative for abdominal pain, blood in stool, diarrhea, nausea and vomiting  Endocrine: Negative for cold intolerance and heat intolerance  Genitourinary: Negative for difficulty urinating  Musculoskeletal: Negative for arthralgias, back pain, gait problem, joint swelling and myalgias  Skin: Negative for rash  Neurological: Negative for dizziness, speech difficulty, weakness and headaches  Psychiatric/Behavioral: Positive for behavioral problems and confusion  Negative for self-injury and suicidal ideas  All other systems reviewed and are negative  Physical Exam  Physical Exam   Constitutional: She is oriented to person, place, and time  She appears well-developed and well-nourished  HENT:   Head: Normocephalic and atraumatic  Nose: Nose normal    Eyes: Pupils are equal, round, and reactive to light  EOM are normal    Neck: Normal range of motion  Neck supple  Cardiovascular: Normal rate, regular rhythm and normal heart sounds   Exam reveals no gallop and no friction rub  No murmur heard  Pulmonary/Chest: Effort normal and breath sounds normal  No respiratory distress  She has no wheezes  She has no rales  Abdominal: Soft  She exhibits no distension  There is no tenderness  There is no rebound and no guarding  Musculoskeletal: Normal range of motion  She exhibits no edema  Neurological: She is alert and oriented to person, place, and time  Skin: Skin is warm and dry  Psychiatric: She has a normal mood and affect  Her behavior is normal  Judgment and thought content normal    Nursing note and vitals reviewed  Vital Signs  ED Triage Vitals   Temp Pulse Resp BP SpO2   -- -- -- -- --      Temp src Heart Rate Source Patient Position - Orthostatic VS BP Location FiO2 (%)   -- -- -- -- --      Pain Score       --           There were no vitals filed for this visit  Visual Acuity      ED Medications  Medications - No data to display    Diagnostic Studies  Results Reviewed     None                 No orders to display              Procedures  Procedures       ED Course  ED Course as of Aug 01 1626   Thu Aug 01, 2019   1617 Patient was evaluated at length by Abdirahman Wallace, the crisis worker, patient appears to be stable for discharge and outpatient therapy                                  MDM    Disposition  Final diagnoses:   None     ED Disposition     None      Follow-up Information    None         Patient's Medications   Discharge Prescriptions    No medications on file     No discharge procedures on file      ED Provider  Electronically Signed by           Jordy Craven MD  08/01/19 0397

## 2019-08-01 NOTE — ED NOTES
Met with patient and completed the crisis intake assessment as well as the safety risk assessment  Patient arrived via EMS from home  Patient presents as bright and mildly anxious  However patient reports she is depressed  Patient at current denies SI/HI as well as hallucinations and paranoia  Patient reports stable sleep, appetite, concentration and motivation  Patient reports compliance with medications as well as outpatient mental health providers  Case was reviewed with ER MD, patient will be discharged home  Patient was educated to follow up with her outpatient mental health providers at [facility]  Patient was also educated if symptoms continue or worsen to return to the nearest ER and is in agreement to do as such

## 2019-08-01 NOTE — ED NOTES
Call was placed to patient mother [place]  [phone]  There was no answer, left a voicemail  Awaiting call back  Will attempt a call to patients mother again shortly

## 2019-08-13 ENCOUNTER — HOSPITAL ENCOUNTER (EMERGENCY)
Facility: HOSPITAL | Age: 24
End: 2019-08-14
Payer: MEDICARE

## 2019-08-13 DIAGNOSIS — F84.0 AUTISM SPECTRUM: Chronic | ICD-10-CM

## 2019-08-13 DIAGNOSIS — F31.9 BIPOLAR DISORDER (HCC): Primary | ICD-10-CM

## 2019-08-13 DIAGNOSIS — S06.9X9A TBI (TRAUMATIC BRAIN INJURY) (HCC): Chronic | ICD-10-CM

## 2019-08-13 LAB
AMPHETAMINES SERPL QL SCN: NEGATIVE
BARBITURATES UR QL: NEGATIVE
BENZODIAZ UR QL: NEGATIVE
COCAINE UR QL: NEGATIVE
ETHANOL SERPL-MCNC: <10 MG/DL
EXT PREG TEST URINE: NORMAL
EXT. CONTROL ED NAV: NORMAL
METHADONE UR QL: NEGATIVE
OPIATES UR QL SCN: NEGATIVE
PCP UR QL: NEGATIVE
THC UR QL: POSITIVE

## 2019-08-13 PROCEDURE — 80307 DRUG TEST PRSMV CHEM ANLYZR: CPT

## 2019-08-13 PROCEDURE — 81025 URINE PREGNANCY TEST: CPT

## 2019-08-13 PROCEDURE — 36415 COLL VENOUS BLD VENIPUNCTURE: CPT

## 2019-08-13 PROCEDURE — 80320 DRUG SCREEN QUANTALCOHOLS: CPT

## 2019-08-13 PROCEDURE — 99285 EMERGENCY DEPT VISIT HI MDM: CPT

## 2019-08-13 NOTE — LETTER
[facility]  [address], [place], [address] [address] [phone]  Dept: [phone]      EMTALA TRANSFER CONSENT    NAME Lucrecia Alvarado                                         1995                              MRN [id number]    I have been informed of my rights regarding examination, treatment, and transfer   by Dr Kenzie Vargas att  providers found    Benefits: Specialized equipment and/or services available at the receiving facility (Include comment)________________________    Risks: Potential for delay in receiving treatment      Consent for Transfer:  I acknowledge that my medical condition has been evaluated and explained to me by the emergency department physician or other qualified medical person and/or my attending physician, who has recommended that I be transferred to the service of  Accepting Physician: Hali Marroquin [place] at [address] Name, [address] : [facility] [address], [place], [place], [address]  The above potential benefits of such transfer, the potential risks associated with such transfer, and the probable risks of not being transferred have been explained to me, and I fully understand them  The doctor has explained that, in my case, the benefits of transfer outweigh the risks  I agree to be transferred  I authorize the performance of emergency medical procedures and treatments upon me in both transit and upon arrival at the receiving facility  Additionally, I authorize the release of any and all medical records to the receiving facility and request they be transported with me, if possible  I understand that the safest mode of transportation during a medical emergency is an ambulance and that the Hospital advocates the use of this mode of transport   Risks of traveling to the receiving facility by car, including absence of medical control, life sustaining equipment, such as oxygen, and medical personnel has been explained to me and I fully understand them  (ALIRIO CORRECT BOX BELOW)  [  ]  I consent to the stated transfer and to be transported by ambulance/helicopter  [  ]  I consent to the stated transfer, but refuse transportation by ambulance and accept full responsibility for my transportation by car  I understand the risks of non-ambulance transfers and I exonerate the Hospital and its staff from any deterioration in my condition that results from this refusal     X___________________________________________    DATE  19  TIME________  Signature of patient or legally responsible individual signing on patient behalf           RELATIONSHIP TO PATIENT_________________________          Provider Certification    NAME Pollo Alvarado                                         1995                              MRN [id number]    A medical screening exam was performed on the above named patient  Based on the examination:    Condition Necessitating Transfer The primary encounter diagnosis was Bipolar disorder ([facility] )  Diagnoses of TBI (traumatic brain injury) ([facility] ) and Autism spectrum were also pertinent to this visit      Patient Condition: The patient has been stabilized such that within reasonable medical probability, no material deterioration of the patient condition or the condition of the unborn child(dino) is likely to result from the transfer    Reason for Transfer: Level of Care needed not available at this facility(inpatient mental health tx)    Transfer Requirements: Facility [place], [place], [facility]   · Space available and qualified personnel available for treatment as acknowledged by YANIQUE Trejo  · Agreed to accept transfer and to provide appropriate medical treatment as acknowledged by       NADINE Herrera  · Appropriate medical records of the examination and treatment of the patient are provided at the time of transfer   [address] _______  · Transfer will be performed by qualified personnel from [facility]  [phone]  and appropriate transfer equipment as required, including the use of necessary and appropriate life support measures  Provider Certification: I have examined the patient and explained the following risks and benefits of being transferred/refusing transfer to the patient/family:  General risk, such as traffic hazards, adverse weather conditions, rough terrain or turbulence, possible failure of equipment (including vehicle or aircraft), or consequences of actions of persons outside the control of the transport personnel, The patient is stable for psychiatric transfer because they are medically stable, and is protected from harming him/herself or others during transport      Based on these reasonable risks and benefits to the patient and/or the unborn child(dino), and based upon the information available at the time of the patients examination, I certify that the medical benefits reasonably to be expected from the provision of appropriate medical treatments at another medical facility outweigh the increasing risks, if any, to the individuals medical condition, and in the case of labor to the unborn child, from effecting the transfer      X____________________________________________ DATE 08/14/19        TIME_______      ORIGINAL - SEND TO MEDICAL RECORDS   COPY - SEND WITH PATIENT DURING TRANSFER

## 2019-08-13 NOTE — ED PROVIDER NOTES
History  Chief Complaint   Patient presents with    Psychiatric Evaluation     Toshia Choudhary is a 58-year-old female with extensive psychiatric history, was brought to the emergency department by ambulance crew due to homicidal ideation and possible suicidal ideation as per the ambulance crew today  Patient denies suicidal ideation when I examined her in the emergency department  History provided by:  Patient and EMS personnel   used: No    Psychiatric Evaluation   Presenting symptoms: aggressive behavior, homicidal ideas and suicidal threats    Degree of incapacity (severity):  Severe  Onset quality:  Sudden  Duration: Today  Timing:  Constant  Progression:  Unchanged  Chronicity:  Recurrent  Context: stressful life event    Context: not alcohol use, not drug abuse, not medication, not noncompliant and not recent medication change    Treatment compliance: All of the time  Relieved by:  Nothing  Worsened by:  Nothing  Ineffective treatments:  None tried  Associated symptoms: poor judgment    Associated symptoms: no abdominal pain, no anhedonia, no anxiety, no appetite change, no chest pain, no decreased need for sleep, not distractible, no euphoric mood, no fatigue, no feelings of worthlessness, no headaches, no hypersomnia, no hyperventilation, no insomnia, no irritability and no weight change    Risk factors: hx of mental illness        Prior to Admission Medications   Prescriptions Last Dose Informant Patient Reported? Taking?    Cholecalciferol (VITAMIN D3) 73140 units TABS   Yes No   Sig: Take by mouth   LORazepam (ATIVAN) 1 mg tablet   Yes No   Sig: Take 1 mg by mouth 3 (three) times a day   QUEtiapine (SEROquel) 400 MG tablet   No No   Sig: Take 1 tablet (400 mg total) by mouth daily at bedtime for 30 days   acetaminophen (TYLENOL) 325 mg tablet   Yes No   Sig: Take 650 mg by mouth every 6 (six) hours as needed for mild pain   divalproex sodium (DEPAKOTE ER) 500 mg 24 hr tablet   No No Sig: Take 2 tablets (1,000 mg total) by mouth daily at bedtime for 30 days   docusate sodium (COLACE) 100 mg capsule   Yes No   Sig: Take 100 mg by mouth 2 (two) times a day   fexofenadine (ALLEGRA) 180 MG tablet   Yes No   Sig: Take 180 mg by mouth daily   lamoTRIgine (LaMICtal) 100 mg tablet   Yes No   Sig: Take 100 mg by mouth 2 (two) times a day   norethindrone-ethinyl estradiol-iron (MICROGESTIN FE1 5/30) 1 5-30 MG-MCG tablet   Yes No   Sig: Take 1 tablet by mouth daily   venlafaxine (EFFEXOR-XR) 75 mg 24 hr capsule   No No   Sig: Take 1 capsule (75 mg total) by mouth daily for 30 days      Facility-Administered Medications: None       Past Medical History:   Diagnosis Date    Adjustment disorder     Anxiety     Autism spectrum disorder     [facility] ([facility] )     Depression     Fracture of skull ([facility] )     Obsessive-compulsive disorder     Oppositional defiant disorder     Seizures ([facility] )     Subarachnoid hemorrhage ([place] [address] )     Traumatic brain injury ([facility] )        History reviewed  No pertinent surgical history  Family History   Adopted: Yes   Family history unknown: Yes     I have reviewed and agree with the history as documented  Social History     Tobacco Use    Smoking status: Never Smoker    Smokeless tobacco: Never Used   Substance Use Topics    Alcohol use: No    Drug use: No        Review of Systems   Constitutional: Negative for appetite change, fatigue and irritability  HENT: Negative  Eyes: Negative  Respiratory: Negative  Cardiovascular: Negative for chest pain  Gastrointestinal: Negative for abdominal pain  Endocrine: Negative  Genitourinary: Negative  Musculoskeletal: Negative  Skin: Negative  Allergic/Immunologic: Negative  Neurological: Negative for headaches  Hematological: Negative  Psychiatric/Behavioral: Positive for behavioral problems and homicidal ideas  The patient is not nervous/anxious and does not have insomnia          Physical Exam  Physical Exam   Constitutional: She is oriented to person, place, and time  She appears well-developed and well-nourished  No distress  HENT:   Head: Normocephalic and atraumatic  Right Ear: External ear normal    Left Ear: External ear normal    Nose: Nose normal    Mouth/Throat: Oropharynx is clear and moist  No oropharyngeal exudate  Eyes: Pupils are equal, round, and reactive to light  Conjunctivae and EOM are normal  Right eye exhibits no discharge  Left eye exhibits no discharge  No scleral icterus  Neck: Normal range of motion  Neck supple  No tracheal deviation present  No thyromegaly present  Cardiovascular: Normal rate and regular rhythm  Pulmonary/Chest: Effort normal and breath sounds normal  No stridor  No respiratory distress  Abdominal: Soft  Bowel sounds are normal  She exhibits no distension  There is no tenderness  Musculoskeletal: Normal range of motion  She exhibits no edema, tenderness or deformity  Lymphadenopathy:     She has no cervical adenopathy  Neurological: She is alert and oriented to person, place, and time  No cranial nerve deficit or sensory deficit  She exhibits normal muscle tone  Coordination normal    Skin: Skin is warm and dry  No rash noted  She is not diaphoretic  No erythema  No pallor  Psychiatric: She has a normal mood and affect  Her behavior is normal  Judgment and thought content normal    Nursing note and vitals reviewed        Vital Signs  ED Triage Vitals [08/13/19 1950]   Temperature Pulse Respirations Blood Pressure SpO2   98 6 °F (37 °C) 91 16 117/69 100 %      Temp src Heart Rate Source Patient Position - Orthostatic VS BP Location FiO2 (%)   -- -- -- -- --      Pain Score       No Pain           Vitals:    08/13/19 1950   BP: 117/69   Pulse: 91         Visual Acuity      ED Medications  Medications - No data to display    Diagnostic Studies  Results Reviewed     Procedure Component Value Units Date/Time    Ethanol [[id number]]  (Normal) Collected:  08/13/19 1941    Lab Status:  Final result Specimen:  Blood from Arm, Right Updated:  08/13/19 2005     Ethanol Lvl <10 mg/dL     Rapid drug screen, urine [[id number]]  (Abnormal) Collected:  08/13/19 1935    Lab Status:  Final result Specimen:  Urine, Catheter Updated:  08/13/19 1956     Amph/Meth UR Negative     Barbiturate Ur Negative     Benzodiazepine Urine Negative     Cocaine Urine Negative     Methadone Urine Negative     Opiate Urine Negative     PCP Ur Negative     THC Urine Positive    Narrative:       Presumptive report  If requested, specimen will be sent to reference lab for confirmation  FOR MEDICAL PURPOSES ONLY  IF CONFIRMATION NEEDED PLEASE CONTACT THE LAB WITHIN 5 DAYS  Drug Screen Cutoff Levels:  AMPHETAMINE/METHAMPHETAMINES  1000 ng/mL  BARBITURATES     200 ng/mL  BENZODIAZEPINES     200 ng/mL  COCAINE      300 ng/mL  METHADONE      300 ng/mL  OPIATES      300 ng/mL  PHENCYCLIDINE     25 ng/mL  THC       50 ng/mL    Presumptive report  If requested, specimen will be sent to reference lab for confirmation  FOR MEDICAL PURPOSES ONLY  IF CONFIRMATION NEEDED PLEASE CONTACT THE LAB WITHIN 5 DAYS  Drug Screen Cutoff Levels:  AMPHETAMINE/METHAMPHETAMINES  1000 ng/mL  BARBITURATES     200 ng/mL  BENZODIAZEPINES     200 ng/mL  COCAINE      300 ng/mL  METHADONE      300 ng/mL  OPIATES      300 ng/mL  PHENCYCLIDINE     25 ng/mL  THC       50 ng/mL    Presumptive report  If requested, specimen will be sent to reference lab for confirmation  FOR MEDICAL PURPOSES ONLY  IF CONFIRMATION NEEDED PLEASE CONTACT THE LAB WITHIN 5 DAYS      Drug Screen Cutoff Levels:  AMPHETAMINE/METHAMPHETAMINES  1000 ng/mL  BARBITURATES     200 ng/mL  BENZODIAZEPINES     200 ng/mL  COCAINE      300 ng/mL  METHADONE      300 ng/mL  OPIATES      300 ng/mL  PHENCYCLIDINE     25 ng/mL  THC       50 ng/mL      POCT pregnancy, urine [[id number]]  (Normal) Resulted:  08/13/19 1942    Lab Status:  Final result Updated:  08/13/19 1944     EXT PREG TEST UR (Ref: Negative) negaTIVE FOR PREG     Control CONTROL IS PRESENT   [id number]   12/31/2020                 No orders to display              Procedures  Procedures       ED Course  ED Course as of Aug 13 2244   Tue Aug 13, 2019   2008 Patient is medically cleared for [facility] Unit evaluation and possible admission  31 [phone] 12 ED care was transferred to Dr Claudean Sicilian  MDM  Number of Diagnoses or Management Options  Bipolar disorder ([facility] ): established and worsening     Amount and/or Complexity of Data Reviewed  Clinical lab tests: ordered and reviewed  Tests in the medicine section of CPT®: ordered and reviewed  Decide to obtain previous medical records or to obtain history from someone other than the patient: yes  Obtain history from someone other than the patient: yes  Review and summarize past medical records: yes    Risk of Complications, Morbidity, and/or Mortality  Presenting problems: moderate  Diagnostic procedures: moderate  Management options: moderate    Patient Progress  Patient progress: stable      Disposition  Final diagnoses:   Bipolar disorder ([facility] )     Time reflects when diagnosis was documented in both MDM as applicable and the Disposition within this note     Time User Action Codes Description Comment    8/13/2019  7:22 PM Authur Mean Add [F31 9] Bipolar disorder [facility])       ED Disposition     None      MD Documentation      Most Recent Value   Sending MD Bindu Butler MD      Follow-up Information    None         Patient's Medications   Discharge Prescriptions    No medications on file     No discharge procedures on file      ED Provider  Electronically Signed by           Naya Richardson MD  08/13/19 6084

## 2019-08-14 ENCOUNTER — HOSPITAL ENCOUNTER (INPATIENT)
Facility: HOSPITAL | Age: 24
LOS: 21 days | Discharge: HOME | DRG: 885 | End: 2019-09-04
Attending: PSYCHIATRY & NEUROLOGY | Admitting: PSYCHIATRY & NEUROLOGY
Payer: MEDICARE

## 2019-08-14 VITALS
WEIGHT: 171.96 LBS | RESPIRATION RATE: 16 BRPM | HEART RATE: 77 BPM | TEMPERATURE: 98 F | OXYGEN SATURATION: 98 % | BODY MASS INDEX: 29.52 KG/M2 | SYSTOLIC BLOOD PRESSURE: 108 MMHG | DIASTOLIC BLOOD PRESSURE: 82 MMHG

## 2019-08-14 DIAGNOSIS — F41.1 GAD (GENERALIZED ANXIETY DISORDER): Chronic | ICD-10-CM

## 2019-08-14 DIAGNOSIS — R29.818 EXTRAPYRAMIDAL SYMPTOM: ICD-10-CM

## 2019-08-14 DIAGNOSIS — J30.2 SEASONAL ALLERGIES: ICD-10-CM

## 2019-08-14 DIAGNOSIS — S06.9X9A TBI (TRAUMATIC BRAIN INJURY) (HCC): Chronic | ICD-10-CM

## 2019-08-14 DIAGNOSIS — F84.0 AUTISM SPECTRUM: Chronic | ICD-10-CM

## 2019-08-14 DIAGNOSIS — F31.9 BIPOLAR DISORDER (HCC): ICD-10-CM

## 2019-08-14 DIAGNOSIS — K21.9 GERD (GASTROESOPHAGEAL REFLUX DISEASE): Primary | ICD-10-CM

## 2019-08-14 DIAGNOSIS — F31.4 BIPOLAR 1 DISORDER, DEPRESSED, SEVERE (HCC): ICD-10-CM

## 2019-08-14 RX ORDER — IBUPROFEN 600 MG/1
600 TABLET ORAL EVERY 8 HOURS PRN
Status: CANCELLED | OUTPATIENT
Start: 2019-08-14

## 2019-08-14 RX ORDER — ACETAMINOPHEN 325 MG/1
650 TABLET ORAL ONCE
Status: COMPLETED | OUTPATIENT
Start: 2019-08-14 | End: 2019-08-14

## 2019-08-14 RX ORDER — TRAZODONE HYDROCHLORIDE 50 MG/1
50 TABLET ORAL
Status: DISCONTINUED | OUTPATIENT
Start: 2019-08-14 | End: 2019-09-04 | Stop reason: HOSPADM

## 2019-08-14 RX ORDER — LORAZEPAM 2 MG/ML
2 INJECTION INTRAMUSCULAR EVERY 6 HOURS PRN
Status: DISCONTINUED | OUTPATIENT
Start: 2019-08-14 | End: 2019-09-04 | Stop reason: HOSPADM

## 2019-08-14 RX ORDER — OLANZAPINE 10 MG/1
10 INJECTION, POWDER, LYOPHILIZED, FOR SOLUTION INTRAMUSCULAR EVERY 8 HOURS PRN
Status: CANCELLED | OUTPATIENT
Start: 2019-08-14

## 2019-08-14 RX ORDER — OLANZAPINE 10 MG/1
10 TABLET ORAL EVERY 8 HOURS PRN
Status: CANCELLED | OUTPATIENT
Start: 2019-08-14

## 2019-08-14 RX ORDER — QUETIAPINE FUMARATE 200 MG/1
200 TABLET, FILM COATED ORAL
Status: CANCELLED | OUTPATIENT
Start: 2019-08-14

## 2019-08-14 RX ORDER — BENZTROPINE MESYLATE 1 MG/1
1 TABLET ORAL EVERY 6 HOURS PRN
Status: CANCELLED | OUTPATIENT
Start: 2019-08-14

## 2019-08-14 RX ORDER — LORATADINE 10 MG/1
10 TABLET ORAL DAILY
Status: DISCONTINUED | OUTPATIENT
Start: 2019-08-15 | End: 2019-09-04 | Stop reason: HOSPADM

## 2019-08-14 RX ORDER — ACETAMINOPHEN 325 MG/1
350 TABLET ORAL EVERY 6 HOURS PRN
Status: CANCELLED | OUTPATIENT
Start: 2019-08-14

## 2019-08-14 RX ORDER — DIVALPROEX SODIUM 500 MG/1
1000 TABLET, EXTENDED RELEASE ORAL
Status: DISCONTINUED | OUTPATIENT
Start: 2019-08-14 | End: 2019-08-15

## 2019-08-14 RX ORDER — RISPERIDONE 1 MG/1
1 TABLET, ORALLY DISINTEGRATING ORAL
Status: CANCELLED | OUTPATIENT
Start: 2019-08-14

## 2019-08-14 RX ORDER — LORAZEPAM 2 MG/ML
2 INJECTION INTRAMUSCULAR EVERY 6 HOURS PRN
Status: CANCELLED | OUTPATIENT
Start: 2019-08-14

## 2019-08-14 RX ORDER — LORAZEPAM 1 MG/1
1 TABLET ORAL EVERY 6 HOURS PRN
Status: CANCELLED | OUTPATIENT
Start: 2019-08-14

## 2019-08-14 RX ORDER — ACETAMINOPHEN 325 MG/1
975 TABLET ORAL EVERY 6 HOURS PRN
Status: CANCELLED | OUTPATIENT
Start: 2019-08-14

## 2019-08-14 RX ORDER — BENZTROPINE MESYLATE 1 MG/1
1 TABLET ORAL EVERY 6 HOURS PRN
Status: DISCONTINUED | OUTPATIENT
Start: 2019-08-14 | End: 2019-09-04 | Stop reason: HOSPADM

## 2019-08-14 RX ORDER — BENZTROPINE MESYLATE 1 MG/ML
1 INJECTION INTRAMUSCULAR; INTRAVENOUS EVERY 6 HOURS PRN
Status: CANCELLED | OUTPATIENT
Start: 2019-08-14

## 2019-08-14 RX ORDER — DIVALPROEX SODIUM 250 MG/1
1000 TABLET, EXTENDED RELEASE ORAL
Status: CANCELLED | OUTPATIENT
Start: 2019-08-14

## 2019-08-14 RX ORDER — BENZTROPINE MESYLATE 1 MG/ML
1 INJECTION INTRAMUSCULAR; INTRAVENOUS EVERY 6 HOURS PRN
Status: DISCONTINUED | OUTPATIENT
Start: 2019-08-14 | End: 2019-09-04 | Stop reason: HOSPADM

## 2019-08-14 RX ORDER — ACETAMINOPHEN 325 MG/1
350 TABLET ORAL EVERY 6 HOURS PRN
Status: DISCONTINUED | OUTPATIENT
Start: 2019-08-14 | End: 2019-09-04 | Stop reason: HOSPADM

## 2019-08-14 RX ORDER — OLANZAPINE 10 MG/1
10 TABLET ORAL EVERY 8 HOURS PRN
Status: DISCONTINUED | OUTPATIENT
Start: 2019-08-14 | End: 2019-09-04 | Stop reason: HOSPADM

## 2019-08-14 RX ORDER — LORATADINE 10 MG/1
10 TABLET ORAL DAILY
Status: CANCELLED | OUTPATIENT
Start: 2019-08-14

## 2019-08-14 RX ORDER — IBUPROFEN 600 MG/1
600 TABLET ORAL EVERY 8 HOURS PRN
Status: DISCONTINUED | OUTPATIENT
Start: 2019-08-14 | End: 2019-09-04 | Stop reason: HOSPADM

## 2019-08-14 RX ORDER — QUETIAPINE FUMARATE 200 MG/1
200 TABLET, FILM COATED ORAL
Status: DISCONTINUED | OUTPATIENT
Start: 2019-08-14 | End: 2019-08-15

## 2019-08-14 RX ORDER — LORAZEPAM 1 MG/1
1 TABLET ORAL EVERY 6 HOURS PRN
Status: DISCONTINUED | OUTPATIENT
Start: 2019-08-14 | End: 2019-09-04 | Stop reason: HOSPADM

## 2019-08-14 RX ORDER — OLANZAPINE 10 MG/1
10 INJECTION, POWDER, LYOPHILIZED, FOR SOLUTION INTRAMUSCULAR EVERY 8 HOURS PRN
Status: DISCONTINUED | OUTPATIENT
Start: 2019-08-14 | End: 2019-09-04 | Stop reason: HOSPADM

## 2019-08-14 RX ORDER — RISPERIDONE 1 MG/1
1 TABLET, ORALLY DISINTEGRATING ORAL
Status: DISCONTINUED | OUTPATIENT
Start: 2019-08-14 | End: 2019-09-04 | Stop reason: HOSPADM

## 2019-08-14 RX ORDER — ACETAMINOPHEN 325 MG/1
975 TABLET ORAL EVERY 6 HOURS PRN
Status: DISCONTINUED | OUTPATIENT
Start: 2019-08-14 | End: 2019-09-04 | Stop reason: HOSPADM

## 2019-08-14 RX ORDER — TRAZODONE HYDROCHLORIDE 50 MG/1
50 TABLET ORAL
Status: CANCELLED | OUTPATIENT
Start: 2019-08-14

## 2019-08-14 RX ADMIN — ACETAMINOPHEN 650 MG: 325 TABLET ORAL at 11:47

## 2019-08-14 RX ADMIN — ACETAMINOPHEN 650 MG: 325 TABLET ORAL at 04:23

## 2019-08-14 RX ADMIN — DIVALPROEX SODIUM 1000 MG: 500 TABLET, FILM COATED, EXTENDED RELEASE ORAL at 21:06

## 2019-08-14 RX ADMIN — QUETIAPINE FUMARATE 200 MG: 200 TABLET ORAL at 21:06

## 2019-08-14 NOTE — ED NOTES
Patient requesting to speak with this crisis worker, " my anxiety is really bad and I can't sleep, how soon till I can go upstairs "     It was explained to patient no beds on unit at current, offered patient transfer to another campus, patient declined stating " I would like to stay here "

## 2019-08-14 NOTE — ED NOTES
Patient eating breakfast, offers no complaints at this time        Sav Polo, PRIYANKA  08/14/19 8071

## 2019-08-14 NOTE — ED NOTES
Patient gave Crisis permission to speak to her mother regarding her care and treatment  Patient's mother, Latisha Azevedo, called Crisis and explained she does not want Patient to return to her home  Latisha Jolly explained she does not know why Patient called the ambulance to take her to the ED  She said she thought Patient was having a good day but she does not feel safe in her home  Latisha Azevedo said the EMT told her Patient wanted to kill her and she feels Patient needs to be placed in a group home or assisted living  Crisis to f/u

## 2019-08-14 NOTE — ED NOTES
Crisis met with Patient in [facility]  [address]  Patient is a 31 y/o female presenting with homicidal ideations with a plan to strangle her mother with blankets or sheets  Patient stated she did not want to act out on these thoughts but does not feel safe outside a hospital at this time  Patient denied any suicidal ideations or hallucinations  She stated she has been flushing her nighttime meds and telling her mother she took them  Patient stated she doesn't feel herself and her emotions are "all over the place "  Patient is calm and cooperative and alert and oriented x4  Patient feels she is not meeting her short term or long term goals and feels her meds are not working  Patient reports increased depression/anxiety and poor concentration/motivation  Patient agreed to sign a 201 after rights were read to and reviewed with her as well as a detailed explanation of the 72 hr notice procedure

## 2019-08-14 NOTE — ED RE-EVALUATION NOTE
Margarita Faria is medically cleared for Behavioral Health Unit evaluation and possible admission       Louie Baig MD  08/13/19 2008

## 2019-08-14 NOTE — ED NOTES
Insurance Authorization for admission:   No pre-cert necessary, Medicare A& B is primary    Phone call placed to [facility]   ([address])  Phone number: [phone]  Spoke to [facility] requested that UR fax upon D/C     EVS (Eligibility Verification System) called - [phone]  Automated system indicates: Pt is eligible for MA, behavioral health - [facility]   Rec# [id number]    [id number] for Transportation:    No transportation auth necessary for [facility]

## 2019-08-14 NOTE — ED NOTES
Patient is accepted at [facility]  Patient is accepted by [facility] per Sravani Garg Specialist      Transportation is arranged with [facility]  Transportation is scheduled for p/u at  18:15  Patient may go to the floor upon arrival w/ EMS  Nurse report is to be called to [phone] prior to patient transfer

## 2019-08-15 LAB
BACTERIA UR QL AUTO: ABNORMAL /HPF
BILIRUB UR QL STRIP: ABNORMAL
CLARITY UR: ABNORMAL
COLOR UR: ABNORMAL
GLUCOSE UR STRIP-MCNC: NEGATIVE MG/DL
HGB UR QL STRIP.AUTO: ABNORMAL
KETONES UR STRIP-MCNC: ABNORMAL MG/DL
LEUKOCYTE ESTERASE UR QL STRIP: ABNORMAL
MUCOUS THREADS UR QL AUTO: ABNORMAL
NITRITE UR QL STRIP: NEGATIVE
NON-SQ EPI CELLS URNS QL MICRO: ABNORMAL /HPF
PH UR STRIP.AUTO: 5.5 [PH]
PROT UR STRIP-MCNC: ABNORMAL MG/DL
RBC #/AREA URNS AUTO: ABNORMAL /HPF
SP GR UR STRIP.AUTO: >=1.03 (ref 1–1.03)
UROBILINOGEN UR QL STRIP.AUTO: 1 E.U./DL
WBC #/AREA URNS AUTO: ABNORMAL /HPF

## 2019-08-15 PROCEDURE — 87147 CULTURE TYPE IMMUNOLOGIC: CPT | Performed by: PHYSICIAN ASSISTANT

## 2019-08-15 PROCEDURE — 99222 1ST HOSP IP/OBS MODERATE 55: CPT | Performed by: PHYSICIAN ASSISTANT

## 2019-08-15 PROCEDURE — 99223 1ST HOSP IP/OBS HIGH 75: CPT | Performed by: PSYCHIATRY & NEUROLOGY

## 2019-08-15 PROCEDURE — 81001 URINALYSIS AUTO W/SCOPE: CPT | Performed by: PHYSICIAN ASSISTANT

## 2019-08-15 PROCEDURE — 87086 URINE CULTURE/COLONY COUNT: CPT | Performed by: PHYSICIAN ASSISTANT

## 2019-08-15 RX ORDER — DIVALPROEX SODIUM 500 MG/1
1000 TABLET, EXTENDED RELEASE ORAL
Status: DISCONTINUED | OUTPATIENT
Start: 2019-08-15 | End: 2019-08-19

## 2019-08-15 RX ORDER — RISPERIDONE 2 MG/1
2 TABLET, FILM COATED ORAL
Status: DISCONTINUED | OUTPATIENT
Start: 2019-08-15 | End: 2019-08-25

## 2019-08-15 RX ADMIN — RISPERIDONE 2 MG: 2 TABLET ORAL at 21:04

## 2019-08-15 RX ADMIN — DIVALPROEX SODIUM 1000 MG: 500 TABLET, FILM COATED, EXTENDED RELEASE ORAL at 21:04

## 2019-08-15 RX ADMIN — LORATADINE 10 MG: 10 TABLET ORAL at 09:09

## 2019-08-15 NOTE — PROGRESS NOTES
30year old female  Adm to [facility] for homicidal ideation towards mother, now stated she loves her mother and would not hurt her, not med compliant at home history of autism, TBI and mental health  Issues c/o depression  Is pleasant and cooperative wearing a  Medical alert bracelet stated she has hearing aids at home but hears with out them

## 2019-08-15 NOTE — CONSULTS
Consult Note   Assessment:  Depression with a suicide ideation  Bipolar disorder  Autism spectrum disorder  Adjustment disorder  Anxiety  UDS-positive for [facility]    Plan:  Admitted in the hospital for psych evaluation treatment  Continue all medications  Labs are pending   ______________________________________________________________________    Consulting Service: Psychiatry    Chief Complaint:  Homicide ideations    HPI: Adrienne Lauren, a 30 y o  female with extensive psych history is admitted in the hospital secondary to with ideation towards her mother and possible suicide ideation  Patient denies any homicide or suicide ideation at present  She denies any auditory or visual hallucinations  Please refer to psych evaluation for more information  Patient denies any medical issues at present including chest pain, difficulty breathing, fever, chills, or any abdominal pain  Review of Systems:  General: negative  Cardiovascular: no chest pain or dyspnea on exertion  Respiratory: no cough, shortness of breath, or wheezing  Gastrointestinal: no abdominal pain, change in bowel habits, or black or bloody stools  Genitourinary ROS: no dysuria, trouble voiding, or hematuria  Musculoskeletal ROS: negative  Neurological ROS: no TIA or stroke symptoms  Hematological and Lymphatic ROS: negative  Dermatological ROS: negative  Psychological ROS: positive for - depression, hostility, irritability, suicidal ideation and homicidal ideation  Ophthalmic ROS: negative  ENT ROS: negative    Past Medical History:  Past Medical History:   Diagnosis Date    Adjustment disorder     Anxiety     Autism spectrum disorder     [facility]     Depression     Fracture of skull (H[facility])     Obsessive-compulsive disorder     Oppositional defiant disorder     Se[facility]ures [facility] )     Subarachnoid hemorrhage [facility]     Traumatic brain injury ([facility] )        Past Surgical History:  No past surgical history on file      Social History:  Social History     Substance and Sexual Activity   Alcohol Use No    Frequency: Never     Social History     Substance and Sexual Activity   Drug Use Not on file     Social History     Tobacco Use   Smoking Status Never Smoker   Smokeless Tobacco Never Used       Family History:  Family History   Adopted: Yes   Family history unknown: Yes       Allergies: Allergies   Allergen Reactions    Eggs Or Egg-Derived Products     Fdc Yellow [Yellow Dye]        Medications:  Current Facility-Administered Medications   Medication Dose Route Frequency    acetaminophen (TYLENOL) tablet 325 mg  325 mg Oral Q6H PRN    acetaminophen (TYLENOL) tablet 975 mg  975 mg Oral Q6H PRN    benztropine (COGENTIN) injection 1 mg  1 mg Intramuscular Q6H PRN    benztropine (COGENTIN) tablet 1 mg  1 mg Oral Q6H PRN    divalproex sodium (DEPAKOTE ER) 24 hr tablet 1,000 mg  1,000 mg Oral After Dinner    ibuprofen (MOTRIN) tablet 600 mg  600 mg Oral Q8H PRN    loratadine (CLARITIN) tablet 10 mg  10 mg Oral Daily    LORazepam (ATIVAN) 2 mg/mL injection 2 mg  2 mg Intramuscular Q6H PRN    LORazepam (ATIVAN) tablet 1 mg  1 mg Oral Q6H PRN    magnesium hydroxide (MILK OF MAGNESIA) 400 mg/5 mL oral suspension 30 mL  30 mL Oral Daily PRN    OLANZapine (ZyPREXA) IM injection 10 mg  10 mg Intramuscular Q8H PRN    OLANZapine (ZyPREXA) tablet 10 mg  10 mg Oral Q8H PRN    QUEtiapine (SEROquel) tablet 200 mg  200 mg Oral HS    risperiDONE (RisperDAL M-TABS) dispersible tablet 1 mg  1 mg Oral Q3H PRN    traZODone (DESYREL) tablet 50 mg  50 mg Oral HS PRN       Vitals:  /55 (08/15/19 0759)    Temp 98 7 °F (37 1 °C) (08/15/19 0759)    Pulse 71 (08/15/19 0759)   Resp 17 (08/15/19 0759)    SpO2        I/Os:  No intake/output data recorded  No intake/output data recorded      Lab Results and Cultures:   CBC with diff:   Lab Results   Component Value Date    WBC 5 10 06/12/2019    HGB 14 7 06/12/2019    HCT 42 5 06/12/2019    MCV 98 06/12/2019     06/12/2019    MCH 34 0 06/12/2019    MCHC 34 6 06/12/2019    RDW 12 8 06/12/2019    MPV 7 3 (L) 06/12/2019    NRBC 0 03/21/2019   ,   BMP/CMP:  Lab Results   Component Value Date     09/23/2015    K 4 8 06/13/2019    K 3 8 09/23/2015     06/13/2019     09/23/2015    CO2 30 06/13/2019    CO2 29 09/23/2015    ANIONGAP 9 09/23/2015    BUN 15 06/13/2019    BUN 6 09/23/2015    CREATININE 0 96 06/13/2019    CREATININE 0 51 (L) 09/23/2015    GLUCOSE 87 09/23/2015    CALCIUM 9 6 06/13/2019    CALCIUM 8 7 09/23/2015    AST 29 06/13/2019    AST 41 09/17/2015    ALT 27 06/13/2019    ALT 35 09/17/2015    ALKPHOS 34 (L) 06/13/2019    ALKPHOS 44 (L) 09/17/2015    PROT 6 2 (L) 09/17/2015    BILITOT 0 73 09/17/2015    EGFR 82 06/13/2019   ,   Lipid Panel: No results found for: CHOL,   Coags:   Lab Results   Component Value Date    PTT 22 (L) 09/16/2015    INR 1 14 09/17/2015   ,     Blood Culture: No results found for: BLOODCX,   Urinalysis:   Lab Results   Component Value Date    COLORU Yellow 08/01/2019    CLARITYU Cloudy (A) 08/01/2019    SPECGRAV >=1 030 (H) 08/01/2019    PHUR 5 5 08/01/2019    LEUKOCYTESUR Negative 08/01/2019    NITRITE Negative 08/01/2019    GLUCOSEU Negative 08/01/2019    KETONESU Trace (A) 08/01/2019    BILIRUBINUR 1+ (A) 08/01/2019    BLOODU Trace-Intact (A) 08/01/2019   ,   Urine Culture: No results found for: URINECX,   Wound Culure: No results found for: WOUNDCULT      Physical Exam:    General appearance: alert and oriented, in no acute distress, cooperative, pleasant   Head: Normocephalic, without obvious abnormality, atraumatic  Eyes: conjunctivae/corneas clear  PERRL, EOM's intact  Fundi benign  Throat: lips, mucosa, and tongue normal; teeth and gums normal  Neck: no adenopathy, no carotid bruit, no JVD, supple, symmetrical, trachea midline and thyroid not enlarged, symmetric, no tenderness/mass/nodules  Back: symmetric, no curvature   ROM normal  No CVA tenderness    Lungs: clear to auscultation bilaterally  Chest wall: no tenderness  Heart: regular rate and rhythm, S1, S2 normal, no murmur, click, rub or gallop  Abdomen: soft, non-tender; bowel sounds normal; no masses,  no organomegaly  Genitalia: deferred  Rectal: deferred  Extremities: extremities normal, warm and well-perfused; no cyanosis, clubbing, or edema  Skin: Skin color, texture, turgor normal  No rashes or lesions  Neurologic: Grossly normal      Valdo Talley PA-C  8/15/2019
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
## 2019-08-15 NOTE — PROGRESS NOTES
Pt seclusive to room throughout day  Declined to attend groups  Out of bed for meals  Denies SI/HI  Childlike in behavior  Repetitive in conversation  C/o feeling shaky and dizzy throughout day  Does not appear shaky  VSS  Encouraged fluids  Disheveled in appearance

## 2019-08-15 NOTE — PROGRESS NOTES
Pt is lying in bed this evening, awake, reports being tired since coming to hospital last night  Encouraged to get out and try groups this evening  Pt denies SI, HI, AVH  Calm, pleasant  Denies any needs at this time
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
## 2019-08-15 NOTE — TREATMENT PLAN
TREATMENT PLAN REVIEW - [address] Al[address] 30 y o  1995 female MRN: [id number]    [address] [place] [address] Room / [place]: [place] [address]/[facility] 278-63 Encounter: [id number]        Admit Date/Time:  8/14/2019  8:09 PM    Treatment Team: Attending Provider: Starr Faustin; Consulting Physician: Jess Drake MD; Registered Nurse: Carlos A Garcia RN; Care Manager: Karsten Roman RN; Occupational Therapy Assistant: KAYLAH Philip; : Bessie Torres; Medications RN: Lori Bowers RN; Patient Care Assistant: Sherman Torrez; Registered Nurse: Sharyle Scotland, RN; Resident: Tiki Alberto MD; Patient Care Technician: Karlene Maguire    Diagnosis: Principal Problem:    Bipolar 1 disorder, depressed, severe ([facility] )  Active Problems:    RADHA (generalized anxiety disorder)    TBI (traumatic brain injury) ([facility] )    Autism spectrum    Tourette's    Patient Strengths/Assets: cooperative, good past treatment response, motivated, motivation for treatment/growth, patient is on a voluntary commitment      Patient Barriers/Limitations: limited insight    Short Term Goals: decrease in depressive symptoms, decrease in anxiety symptoms, decrease in suicidal thoughts    Long Term Goals: improvement in depression, improvement in anxiety, stabilization of mood, free of suicidal thoughts    Progress Towards Goals: starting psychiatric medications as prescribed    Recommended Treatment: medication management, patient medication education, group therapy, milieu therapy, continued Behavioral Health psychiatric evaluation/assessment process     Treatment Frequency: daily medication monitoring, group and milieu therapy daily, monitoring through interdisciplinary rounds, monitoring through weekly patient care conferences    Expected Discharge Date: 7 days - 8/22/2019    Discharge Plan: referral for outpatient medication management with a psychiatrist, referral for outpatient psychotherapy    Treatment Plan Created/Updated By: Ely Villareal

## 2019-08-15 NOTE — PROGRESS NOTES
New Patient  Status: Pt is a [address] from [address]  She reported HI toward mom  Mom said Pt is not taking her medications & flushing them down the toilet  She does wear hearing aides but they are not here  Hx of Autism & TBI  Pt is pleasant this morning     Medication: to be reviewed  D/C: TBD

## 2019-08-15 NOTE — PLAN OF CARE
Problem: Ineffective Coping  Goal: Cooperates with admission process  Description  Interventions:   - Complete admission process  8/14/2019 2200 by All Aldrich RN  Outcome: Progressing  8/14/2019 2145 by All Aldrich RN  Outcome: Progressing  8/14/2019 2142 by All Aldrich RN  Outcome: Progressing  Goal: Identifies ineffective coping skills  8/14/2019 2200 by All Aldrich RN  Outcome: Progressing  8/14/2019 2145 by All Aldrich RN  Outcome: Progressing  8/14/2019 2142 by All Aldrich RN  Outcome: Progressing  Goal: Identifies healthy coping skills  8/14/2019 2200 by All Aldrich RN  Outcome: Progressing  8/14/2019 2145 by All Aldrich RN  Outcome: Progressing  8/14/2019 2142 by All Aldrich RN  Outcome: Progressing  Goal: Demonstrates healthy coping skills  8/14/2019 2200 by All Aldrich RN  Outcome: Progressing  8/14/2019 2145 by All Aldrich RN  Outcome: Progressing  8/14/2019 2142 by All Aldrich RN  Outcome: Progressing  Goal: Participates in unit activities  Description  Interventions:  - Provide therapeutic environment   - Provide required programming   - Redirect inappropriate behaviors   8/14/2019 2200 by All Aldrich RN  Outcome: Progressing  8/14/2019 2145 by All Aldrich RN  Outcome: Progressing  8/14/2019 2142 by All Aldrich RN  Outcome: Progressing  Goal: Patient/Family participate in treatment and DC plans  Description  Interventions:  - Provide therapeutic environment  8/14/2019 2200 by All Aldrich RN  Outcome: Progressing  8/14/2019 2145 by All lAdrich RN  Outcome: Progressing  8/14/2019 2142 by All Aldrich RN  Outcome: Progressing  Goal: Patient/Family verbalizes awareness of resources  8/14/2019 2200 by All Aldrich RN  Outcome: Progressing  8/14/2019 2145 by All Aldrich RN  Outcome: Progressing  8/14/2019 2142 by All Aldrich RN  Outcome: Progressing  Goal: Understands least restrictive measures  Description  Interventions:  - Utilize least restrictive behavior  8/14/2019 2200 by Edvin Li Gordon Montoya RN  Outcome: Progressing  8/14/2019 2145 by Jayesh Berg RN  Outcome: Progressing  8/14/2019 2142 by Jayesh Berg RN  Outcome: Progressing  Goal: Free from restraint events  Description  - Utilize least restrictive measures   - Provide behavioral interventions   - Redirect inappropriate behaviors   8/14/2019 2200 by Jayesh Berg RN  Outcome: Progressing  8/14/2019 2145 by Jayesh Berg RN  Outcome: Progressing  8/14/2019 2142 by Jayesh Berg RN  Outcome: Progressing

## 2019-08-15 NOTE — CASE MANAGEMENT
CM met with Pt who was resting in bed  She initially said she was tired & wanted to rest, but she agreed for CM to pull up a chair & talk to her  CM & Pt reviewed & signed IMM, & ROIs for Elsi Alvarado(mom), [facility]), [facility] MH/DS([facility])  Pt is a 29 y/o female who reported that she got into an argument with her mom  She said that they frequently argue over Pt not taking the dog out to the bathroom & not putting dishes in the   Pt said that she was having thoughts to harm her mom, & she called -  Pt said that she has had thoughts like this before, & sometimes she calls  or she will just go to her room  Pt said that sometimes she will stay in her room & only go downstairs to get something to eat, because she doesn't like feeling mad  Pt is single & lives with her mom & her dog  Pt said that her dad  in 1 & they having a hard time with this  Pt said that she was adopted at birth, so she doesn't know her real family  Pt reported that her mom is a support, however, they do argue too  Pt reported she had a hard time sleeping in the hospital last night, because she is use to [facility] & being in the [facility], she could hear the cars driving by outside & it would wake her up  Pt confirmed that she sees Dr Nisha Barba at [facility]  Pt said that she went to art therapy once there  CM asked Pt about the [address] program & Pt said that they talked about it, but she never went  Pt said that she thought she had a , but her mom was in charge of that & CM would need to ask her  Pt said that she is getting a new PCP because something happened around the time she was previous in the hospital(?)  Pt reported that she has a pillbox that is for  through Saturday, which her mom fills for her  Pt denied any issues with medication compliance, but said that she wasn't sure how much they really helped  Pt denied any drug, alcohol, or tobacco use, ever  Of note, Pt's UDS was positive for THC on 8/13/19 & 7/31/19, but prior to that it was negative  Pt reported that she is Demaris Laser, but denied any Sikh/spiritual request   She said that they mostly go to [employer] on [place] & [place]  Pt reported that she graduated high school & she didn't make it to college, but would like to one day  Pt said that she has never really had a job, accept in high school they had a paid work experience, but she didn't really like it  Pt denied any legal issues  Pt reported her goal for this hospitalization is to get help with her emotions; they are all over the place

## 2019-08-15 NOTE — PLAN OF CARE
Problem: Depression  Goal: Verbalize thoughts and feelings  Description  Interventions:  - Assess and re-assess patient's level of risk   - Engage patient in 1:1 interactions, daily, for a minimum of 15 minutes   - Encourage patient to express feelings, fears, frustrations, hopes   Outcome: Progressing

## 2019-08-15 NOTE — PROGRESS NOTES
Clinical Pharmacy Note: Valproic Acid    Kadie Domingo is a 30 y o  female who presents with bipolar disorder  Assessment/Plan:    VPA indication: mood disorder     Maria Antonia Klein is currently taking 1000 mg extended release tablet once daily  Valproic Acid concentration has not been drawn yet for this admission  It is recommended to draw a dose after three days of taking depakote on Saturday 8/17/19 before the bedtime dose (due to once daily dosing interval) so that there is an accurate steady state trough  Currently patient is receiving around 14mg/kg which is an appropriate starting dosage  Pharmacist has reviewed liver function tests, pancreatic enzymes, and pregnancy test (if drawn) or made recommendations for such tests YES    Pregnancy test is ordered   Need to change therapy if patient is pregnant  Follow up on 8/16  Pharmacy Recommendations: Follow up pregnancy test, if positive need to consider an alternative agent other than depakote  Patient needs blood work for this admission for depakote monitoring  Recommend drawing a depakote level before night time dose for true steady state after three days of therapy on Saturday 8/17/19                     Monitoring:    Valproic Acid:  0   Lab Value Date/Time    VALPROICTOT 61 7 07/16/2019 0621       Liver Function:  0   Lab Value Date/Time    ALB 3 9 06/13/2019 2213    ALB 4 2 06/12/2019 1051    ALB 3 5 03/21/2019 2120    ALB 3 1 (L) 09/17/2015 0436     0   Lab Value Date/Time    TBILI 0 30 06/13/2019 2213    TBILI 0 40 06/12/2019 1051    TBILI 0 20 03/21/2019 2120     0   Lab Value Date/Time    AST 29 06/13/2019 2213    AST 20 06/12/2019 1051    AST 24 03/21/2019 2120    AST 41 09/17/2015 0436     0   Lab Value Date/Time    ALT 27 06/13/2019 2213    ALT 16 06/12/2019 1051    ALT 51 03/21/2019 2120    ALT 35 09/17/2015 0436     0   Lab Value Date/Time    INR 1 14 09/17/2015 0436    INR 1 13 09/16/2015 1324       Platelets:  0   Lab Value Date/Time     06/12/2019 1051     09/22/2015 1248       Therapeutic valproic acid levels are  ug/mL, but target range varies by indication  Levels above 150 ug/mL indicate toxicity, although toxicity may be associated with levels within therapeutic ranges based on symptoms  If switching from delayed release to extended release formulation, increase dose by 8 to 20% and dose daily to maintain therapeutic serum levels  Monitor pancreatic enzymes if patient presents with abdominal pain consistent with pancreatitis which is a black box warning  Also, monitor liver function for black box warning of hepatotoxicity, check ammonia level if suspected  Suspect toxicity in stabilized patients if new-onset sedation, nausea, vomiting, diarrhea, and/or tremor  Reassess valproic acid level if liver function or mental status changes  May cause dose-related thrombocytopenia, inhibition of platelet aggregation, and bleeding  In some cases, platelet counts may be normalized with continued treatment; however, reduce dose or discontinue drug if patient develops evidence of hemorrhage, bruising, or a disorder of hemostasis/coagulation  Females of child bearing age should be on birth control due to very high teratogenic potential      Pharmacy will continue to follow patient with team  Thank you      Electronically signed by: Lacho Mullins, PharmD, [address], Clinical Pharmacist - Psychiatry

## 2019-08-16 LAB
ALBUMIN SERPL BCP-MCNC: 4.1 G/DL (ref 3.5–5)
ALP SERPL-CCNC: 77 U/L (ref 46–116)
ALT SERPL W P-5'-P-CCNC: 36 U/L (ref 12–78)
AMMONIA PLAS-SCNC: <10 UMOL/L (ref 11–35)
ANION GAP SERPL CALCULATED.3IONS-SCNC: 14 MMOL/L (ref 4–13)
AST SERPL W P-5'-P-CCNC: 33 U/L (ref 5–45)
BASOPHILS # BLD AUTO: 0 THOUSANDS/ΜL (ref 0–0.1)
BASOPHILS NFR BLD AUTO: 0 % (ref 0–1)
BILIRUB SERPL-MCNC: 0.3 MG/DL (ref 0.2–1)
BUN SERPL-MCNC: 14 MG/DL (ref 5–25)
CALCIUM SERPL-MCNC: 9.8 MG/DL (ref 8.3–10.1)
CHLORIDE SERPL-SCNC: 102 MMOL/L (ref 100–108)
CK MB SERPL-MCNC: 1.7 NG/ML (ref 0–5)
CK MB SERPL-MCNC: <1 % (ref 0–2.5)
CK SERPL-CCNC: 281 U/L (ref 26–192)
CO2 SERPL-SCNC: 25 MMOL/L (ref 21–32)
CREAT SERPL-MCNC: 0.72 MG/DL (ref 0.6–1.3)
EOSINOPHIL # BLD AUTO: 0 THOUSAND/ΜL (ref 0–0.61)
EOSINOPHIL NFR BLD AUTO: 0 % (ref 0–6)
ERYTHROCYTE [DISTWIDTH] IN BLOOD BY AUTOMATED COUNT: 12.1 % (ref 11.6–15.1)
GFR SERPL CREATININE-BSD FRML MDRD: 116 ML/MIN/1.73SQ M
GLUCOSE SERPL-MCNC: 107 MG/DL (ref 65–140)
HCT VFR BLD AUTO: 44.1 % (ref 34.8–46.1)
HGB BLD-MCNC: 14.6 G/DL (ref 11.5–15.4)
IMM GRANULOCYTES # BLD AUTO: 0.01 THOUSAND/UL (ref 0–0.2)
IMM GRANULOCYTES NFR BLD AUTO: 0 % (ref 0–2)
LYMPHOCYTES # BLD AUTO: 0.92 THOUSANDS/ΜL (ref 0.6–4.47)
LYMPHOCYTES NFR BLD AUTO: 14 % (ref 14–44)
MCH RBC QN AUTO: 32.4 PG (ref 26.8–34.3)
MCHC RBC AUTO-ENTMCNC: 33.1 G/DL (ref 31.4–37.4)
MCV RBC AUTO: 98 FL (ref 82–98)
MONOCYTES # BLD AUTO: 0.28 THOUSAND/ΜL (ref 0.17–1.22)
MONOCYTES NFR BLD AUTO: 4 % (ref 4–12)
NEUTROPHILS # BLD AUTO: 5.32 THOUSANDS/ΜL (ref 1.85–7.62)
NEUTS SEG NFR BLD AUTO: 82 % (ref 43–75)
NRBC BLD AUTO-RTO: 0 /100 WBCS
PLATELET # BLD AUTO: 167 THOUSANDS/UL (ref 149–390)
PMV BLD AUTO: 8.8 FL (ref 8.9–12.7)
POTASSIUM SERPL-SCNC: 4 MMOL/L (ref 3.5–5.3)
PROT SERPL-MCNC: 7.7 G/DL (ref 6.4–8.2)
RBC # BLD AUTO: 4.51 MILLION/UL (ref 3.81–5.12)
SODIUM SERPL-SCNC: 141 MMOL/L (ref 136–145)
WBC # BLD AUTO: 6.53 THOUSAND/UL (ref 4.31–10.16)

## 2019-08-16 PROCEDURE — 82550 ASSAY OF CK (CPK): CPT | Performed by: PSYCHIATRY & NEUROLOGY

## 2019-08-16 PROCEDURE — 80053 COMPREHEN METABOLIC PANEL: CPT | Performed by: PSYCHIATRY & NEUROLOGY

## 2019-08-16 PROCEDURE — 99232 SBSQ HOSP IP/OBS MODERATE 35: CPT | Performed by: PSYCHIATRY & NEUROLOGY

## 2019-08-16 PROCEDURE — 82553 CREATINE MB FRACTION: CPT | Performed by: PSYCHIATRY & NEUROLOGY

## 2019-08-16 PROCEDURE — 82140 ASSAY OF AMMONIA: CPT | Performed by: PSYCHIATRY & NEUROLOGY

## 2019-08-16 PROCEDURE — 85025 COMPLETE CBC W/AUTO DIFF WBC: CPT | Performed by: PSYCHIATRY & NEUROLOGY

## 2019-08-16 RX ORDER — ONDANSETRON 4 MG/1
4 TABLET, ORALLY DISINTEGRATING ORAL EVERY 6 HOURS PRN
Status: DISCONTINUED | OUTPATIENT
Start: 2019-08-16 | End: 2019-09-04 | Stop reason: HOSPADM

## 2019-08-16 RX ADMIN — DIVALPROEX SODIUM 1000 MG: 500 TABLET, FILM COATED, EXTENDED RELEASE ORAL at 21:12

## 2019-08-16 RX ADMIN — ONDANSETRON 4 MG: 4 TABLET, ORALLY DISINTEGRATING ORAL at 12:44

## 2019-08-16 RX ADMIN — RISPERIDONE 2 MG: 2 TABLET ORAL at 21:12

## 2019-08-16 RX ADMIN — LORATADINE 10 MG: 10 TABLET ORAL at 09:37

## 2019-08-16 RX ADMIN — ONDANSETRON 4 MG: 4 TABLET, ORALLY DISINTEGRATING ORAL at 19:10

## 2019-08-16 NOTE — PROGRESS NOTES
Treatment team mtg:  Discussed diagnosis and medications  Work with CM in community for planning  CM reached out to mom  Staff attempted labs and were unable to obtain  Patient reported nausea and vomited once  MD will order zofran

## 2019-08-16 NOTE — PROGRESS NOTES
Pt remains focused on somatic complaints  Stated she felt like she was going to vomit when MHT attempting AM blood work, then loudly vomited small amount of clear liquid onto bedroom floor  Pt then approached RN station multiple times stating she feels dizzy  Vitals remain stable  Pt ambulating back and forth to bedroom with steady gait  Pt very disheveled in appearance with poor ADLs  No SI/HI

## 2019-08-16 NOTE — CASE MANAGEMENT
CM returned a phone call to Pt's Suzi Navas, @ [address] MH/DS @ [phone] & left a message seeking a return call to discussing housing options for Pt

## 2019-08-16 NOTE — PROGRESS NOTES
Pt calm and cooperative during conversation  Reports continued nausea, no further vomiting  Pt has been encouraged to drink fluids as tolerated per physician recommendation  Provided with ice chips as well  Pt did not eat dinner d/t nausea  Given PRN Zofran  Will continue to monitor

## 2019-08-16 NOTE — PROGRESS NOTES
Pt appears to have had poor sleep overnight  Per rounding MHT, pt continues to look up when rounding is being completed q 7 minutes  Denies any needs

## 2019-08-16 NOTE — PROGRESS NOTES
Status: Pt out of bed yesterday only for meals & she refused dinner  She was cooperative when engaged & did attend wrap-up group  Pt slept overnight  Medication: Seroquel & Effexor discontinued   D/C: next week    Pt's mom is stating she cannot return home, & CM is waiting to talk to Pt' TCM regarding placement she has been working on       08/16/19 5530   Team Meeting   Meeting Type Daily Rounds   Team Members Present   Team Members Present Physician;Nurse;Occupational Therapist;   Physician Team Member Dr Patricia Chapman / Edwin Morrell Team Member Carmen Bowie / Leopoldo Berlin Management Team Member Cristel Rossi / Arleth Camacho   OT Team Member Frank   Patient/Family Present   Patient Present No   Patient's Family Present No

## 2019-08-16 NOTE — CASE MANAGEMENT
Voicemail received from Pt's ICM, Jayy Quinteros, that the higher management had a meeting today to brainstorm for placement options for Pt  She said that they outreached to [facility], TBI facilities & facilities, & at this time did not have any options for Pt

## 2019-08-16 NOTE — PROGRESS NOTES
Psychiatric Evaluation - [id number] [facility] [address] 30 y o  female MRN: [id number]  Unit/Bed#: [facility] 255-02 Encounter: [id number]    Assessment/Plan   Principal Problem:    Bipolar 1 disorder, depressed, severe ([facility] [address] )  Active Problems:    RADHA (generalized anxiety disorder)    TBI (traumatic brain injury) ([facility] [address] )    Autism spectrum    Tourette's    Plan:   Continue Depakote 1000 mg HS for mood stabilization  Continue risperidone 2 mg HS for management of impulsivity; will consider switching to Invega sustaina on discharge    Treatment options and alternatives were reviewed with the patient, who concurs with the above plan  Risks, benefits, and possible side effects of medications were explained to the patient, and she verbalizes understanding       -----------------------------------    Subjective: Dhara Dunbar has been compliant with medications  she reports nausea and vomiting this morning, stating that she was only able to eat a few slices speech is so that she was able to take her claratin  Patient states that she was feeling better for a few hours but then began with nausea again when attempting to eat lunch  She reports good sleep overnight with stable mood  Patient is consenting for safety on the unit      Psychiatric Review of Systems:  Behavior over the last 24 hours: unchanged  Sleep: improved  Appetite: decreased  Medication side effects: none  ROS: nausea, vomiting, GI upset, fatigue, anxiety      Meds/Allergies   all current active meds have been reviewed and current meds:   Current Facility-Administered Medications   Medication Dose Route Frequency    acetaminophen (TYLENOL) tablet 325 mg  325 mg Oral Q6H PRN    acetaminophen (TYLENOL) tablet 975 mg  975 mg Oral Q6H PRN    benztropine (COGENTIN) injection 1 mg  1 mg Intramuscular Q6H PRN    benztropine (COGENTIN) tablet 1 mg  1 mg Oral Q6H PRN    divalproex sodium (DEPAKOTE ER) 24 hr tablet 1,000 mg  1,000 mg Oral HS    ibuprofen (MOTRIN) tablet 600 mg 600 mg Oral Q8H PRN    loratadine (CLARITIN) tablet 10 mg  10 mg Oral Daily    LORazepam (ATIVAN) 2 mg/mL injection 2 mg  2 mg Intramuscular Q6H PRN    LORazepam (ATIVAN) tablet 1 mg  1 mg Oral Q6H PRN    magnesium hydroxide (MILK OF MAGNESIA) 400 mg/5 mL oral suspension 30 mL  30 mL Oral Daily PRN    OLANZapine (ZyPREXA) IM injection 10 mg  10 mg Intramuscular Q8H PRN    OLANZapine (ZyPREXA) tablet 10 mg  10 mg Oral Q8H PRN    ondansetron (ZOFRAN-ODT) dispersible tablet 4 mg  4 mg Oral Q6H PRN    risperiDONE (RisperDAL M-TABS) dispersible tablet 1 mg  1 mg Oral Q3H PRN    risperiDONE (RisperDAL) tablet 2 mg  2 mg Oral HS    traZODone (DESYREL) tablet 50 mg  50 mg Oral HS PRN     Allergies   Allergen Reactions    Eggs Or Egg-Derived Products     Fdc Yellow [Yellow Dye]        Objective    Vital signs in last 24 hours:  Temp:  [96 6 °F (35 9 °C)-97 5 °F (36 4 °C)] 96 8 °F (36 °C)  HR:  [] 104  Resp:  [16-18] 16  BP: (109-123)/(59-64) 123/64    No intake or output data in the 24 hours ending 08/16/19 1157    Mental Status Evaluation:  Appearance:  alert, casually dressed, appears stated age, appropriate grooming and hygiene, overweight and short curly blonde hair,    Behavior:  cooperative, pleasant, sitting comfortably in chair, fair eye contact, no abnormal movements and normal gait and balance   Speech:  spontaneous, clear, normal rate, soft and coherent   Mood:  dysphoric, anxious and decreased range   Affect:  mood-congruent, constricted, dysphoric and anxious   Thought Process:  coherent, perseverative, normal rate of thoughts   Thought Content: no verbalized delusions, no overt paranoia, no obsessive thinking, ruminating thoughts   Perceptual disturbances: no auditory hallucinations and no visual hallucinations   Risk Potential: Passive death wishes, Homicidal Ideation without plan, Low potential for aggression   Cognition: oriented to person, place, time, and situation, appears to be below average intelligence, impaired abstract reasoning and attention span appeared shorter than expected for age   Insight:  Limited   Judgment: Poor       Laboratory results:   I have personally reviewed all pertinent laboratory/tests results    Most Recent Labs:   Lab Results   Component Value Date    WBC 5 10 06/12/2019    RBC 4 33 06/12/2019    HGB 14 7 06/12/2019    HCT 42 5 06/12/2019     06/12/2019    RDW 12 8 06/12/2019    NEUTROABS 2 80 06/12/2019    SODIUM 138 06/13/2019    K 4 8 06/13/2019     06/13/2019    CO2 30 06/13/2019    BUN 15 06/13/2019    CREATININE 0 96 06/13/2019    GLUCOSE 87 09/23/2015    GLUC 103 (H) 06/13/2019    GLUF 95 07/16/2019    CALCIUM 9 6 06/13/2019    AST 29 06/13/2019    ALT 27 06/13/2019    ALKPHOS 34 (L) 06/13/2019    TP 6 5 06/13/2019    ALB 3 9 06/13/2019    TBILI 0 30 06/13/2019    CHOLESTEROL 180 07/16/2019    HDL 59 07/16/2019    TRIG 176 (H) 07/16/2019    LDLCALC 86 07/16/2019    NONHDLC 121 07/16/2019    VALPROICTOT 61 7 07/16/2019    BFJ3JVFGPVGQ 6 070 (H) 06/12/2019    FREET4 0 68 (L) 06/12/2019    T3FREE 2 44 06/12/2019    PREGSERUM Negative 09/16/2015    RPR Non-Reactive 06/13/2019    HGBA1C 5 3 06/13/2019     06/13/2019       Imaging Studies:   No orders to display

## 2019-08-16 NOTE — PLAN OF CARE
Problem: Ineffective Coping  Goal: Cooperates with admission process  Description  Interventions:   - Complete admission process  Outcome: Progressing  Goal: Identifies ineffective coping skills  Outcome: Progressing  Goal: Identifies healthy coping skills  Outcome: Progressing  Goal: Demonstrates healthy coping skills  Outcome: Progressing  Goal: Participates in unit activities  Description  Interventions:  - Provide therapeutic environment   - Provide required programming   - Redirect inappropriate behaviors   Outcome: Progressing  Goal: Patient/Family participate in treatment and DC plans  Description  Interventions:  - Provide therapeutic environment  Outcome: Progressing  Goal: Patient/Family verbalizes awareness of resources  Outcome: Progressing  Goal: Understands least restrictive measures  Description  Interventions:  - Utilize least restrictive behavior  Outcome: Progressing  Goal: Free from restraint events  Description  - Utilize least restrictive measures   - Provide behavioral interventions   - Redirect inappropriate behaviors   Outcome: Progressing     Problem: Depression  Goal: Treatment Goal: Demonstrate behavioral control of depressive symptoms, verbalize feelings of improved mood/affect, and adopt new coping skills prior to discharge  Outcome: Progressing  Goal: Verbalize thoughts and feelings  Description  Interventions:  - Assess and re-assess patient's level of risk   - Engage patient in 1:1 interactions, daily, for a minimum of 15 minutes   - Encourage patient to express feelings, fears, frustrations, hopes   Outcome: Progressing  Goal: Refrain from harming self  Description  Interventions:  - Monitor patient closely, per order   - Supervise medication ingestion, monitor effects and side effects   Outcome: Progressing  Goal: Refrain from isolation  Description  Interventions:  - Develop a trusting relationship   - Encourage socialization   Outcome: Progressing  Goal: Refrain from self-neglect  Outcome: Progressing  Goal: Attend and participate in unit activities, including therapeutic, recreational, and educational groups  Description  Interventions:  - Provide therapeutic and educational activities daily, encourage attendance and participation, and document same in the medical record   Outcome: Progressing  Goal: Complete daily ADLs, including personal hygiene independently, as able  Description  Interventions:  - Observe, teach, and assist patient with ADLS  -  Monitor and promote a balance of rest/activity, with adequate nutrition and elimination   Outcome: Progressing

## 2019-08-17 LAB — BACTERIA UR CULT: ABNORMAL

## 2019-08-17 PROCEDURE — 99232 SBSQ HOSP IP/OBS MODERATE 35: CPT | Performed by: PSYCHIATRY & NEUROLOGY

## 2019-08-17 RX ORDER — FAMOTIDINE 20 MG/1
20 TABLET, FILM COATED ORAL 2 TIMES DAILY
Status: DISCONTINUED | OUTPATIENT
Start: 2019-08-17 | End: 2019-09-04 | Stop reason: HOSPADM

## 2019-08-17 RX ADMIN — FAMOTIDINE 20 MG: 20 TABLET ORAL at 13:04

## 2019-08-17 RX ADMIN — ONDANSETRON 4 MG: 4 TABLET, ORALLY DISINTEGRATING ORAL at 18:16

## 2019-08-17 RX ADMIN — LORATADINE 10 MG: 10 TABLET ORAL at 09:17

## 2019-08-17 RX ADMIN — LORAZEPAM 1 MG: 1 TABLET ORAL at 03:29

## 2019-08-17 RX ADMIN — TRAZODONE HYDROCHLORIDE 50 MG: 50 TABLET ORAL at 00:39

## 2019-08-17 RX ADMIN — DIVALPROEX SODIUM 1000 MG: 500 TABLET, FILM COATED, EXTENDED RELEASE ORAL at 21:49

## 2019-08-17 RX ADMIN — FAMOTIDINE 20 MG: 20 TABLET ORAL at 17:36

## 2019-08-17 RX ADMIN — RISPERIDONE 2 MG: 2 TABLET ORAL at 21:49

## 2019-08-17 NOTE — PROGRESS NOTES
Daily rounds  No SI/HI  Pt in bed all day but appeared to be awake most of day  Difficulty sleeping overnight and received prns  Vomiting and c/o dizziness during the day  Received zofran  Blood work obtained  Fluids encouraged

## 2019-08-17 NOTE — PROGRESS NOTES
Pt has been awake since start of shift, sitting up in bed  Pt denies needs  Was offered and received PRN Trazodone 50mg po at 0039 for sleep  Provided with crackers and water  Pt continues to sit, awake on bed  Pleasant in conversation, denies needs  Reports feeling "a little bit" when asked if tired  Denies sleeping during day today  Will continue to monitor and support  Pt received PRN Ativan 1mg po at 0329 for anxiety and inability to sleep  Pt reports when trying to lie back, gets nauseous and gags  Staff made pt comfortable by placing pillows behind pt in an upright position  Pt was observed sleeping by 0430

## 2019-08-17 NOTE — PROGRESS NOTES
Progress Note - [address] [facility] V Jenny 30 y o  female MRN: @MRN   Unit/Bed#: Frances Reza 639-13 Encounter: [id number]    Per nursing report and sign out, patient can often be somatic, did vomit yesterday and has zofran  Encouraging fluids  No SI or HI  In bed all day yesterday, but did not sleep well last night    Kieran Lagos reports today that anxiety is "not that I know of at the moment", depression 2-3/10  No SI or HI  Hard to keep meds down  Has had nausea the last few days  Not drinking fluids much  Not eating much  Not throwing up, but feels still low appetite  She takes antacids at home regularly  Will try pepcid       Energy: low  Sleep: insomnia  Appetite: decreased  Medication side effects: no   ROS: N/V, some dizziness    Mental Status Evaluation:    Appearance:  age appropriate   Behavior:  pleasant, cooperative, with good eye contact   Speech:  Normal volume, regular rate and rhythm, latency, hyperverbal but not pressured   Mood:  euthymic   Affect:  dysphoric, anxious       Thought Process:  circumferential   Associations: intact associations   Thought Content:  normal and appropriate, obsessive   Perceptual Disturbances: no auditory or visual hallcunations   Risk Potential: Suicidal ideation - None  Homicidal ideation - None  Potential for aggression - No   Sensorium:  A&Ox3   Cognition:  grossly intact   Consciousness:  Alert & Awake   Memory:  recent and remote memory grossly intact   Attention: attention span and concentration appear shorter than expected for age           Insight:  limited   Judgment: fair   Muscle Strength  Muscle Tone normal  normal   Gait/Station: normal gait/station with good balance   Motor Activity: no abnormal movements       Vital signs in last 24 hours:    Temp:  [96 9 °F (36 1 °C)-98 °F (36 7 °C)] 98 °F (36 7 °C)  HR:  [] 94  Resp:  [16] 16  BP: (116-127)/(67-69) 116/67    Laboratory results:  I have personally reviewed all pertinent laboratory/tests results  Assessment/Plan   Principal Problem:    Bipolar 1 disorder, depressed, severe (HCC)  Active Problems:    RADHA (generalized anxiety disorder)    TBI (traumatic brain injury) ([facility] [address] )    Autism spectrum    Tourette's      Addison Robertson is still having upset stomach, no vomit today  Encouraged zofran, hydration,food as possible  Also will add pepcid to see if heartburn contributing (she states she has issues with heartburn at home)  Recommended Treatment:     Planned medication and treatment changes: All current active medications have been reviewed  Encourage group therapy, milieu therapy and occupational therapy  [address] checks as unit standard unless ordered or noted otherwise      Current Facility-Administered Medications:  acetaminophen 325 mg Oral Q6H PRN Lilibeth Beckford PA-C   acetaminophen 975 mg Oral Q6H PRN Lilibeth Beckford PA-C   benztropine 1 mg Intramuscular Q6H PRN NADINE Cardenas-IVNÁ   benztropine 1 mg Oral Q6H PRN Lilibeth Beckford PA-C   divalproex sodium 1,000 mg Oral HS Doreen Rendon MD   ibuprofen 600 mg Oral Q8H PRN Lilibeth Beckford PA-C   loratadine 10 mg Oral Daily Lilibeth Beckford PA-C   LORazepam 2 mg Intramuscular Q6H PRN NADINE Cardenas-IVÁN   LORazepam 1 mg Oral Q6H PRN Lilibeth Beckford, PA-IVÁN   magnesium hydroxide 30 mL Oral Daily PRN Lilibeth Beckford, PA-IVÁN   OLANZapine 10 mg Intramuscular Q8H PRN NADINE Cardenas-IVÁN   OLANZapine 10 mg Oral Q8H PRN NADINE Cardenas-IVÁN   ondansetron 4 mg Oral Q6H PRN Yoni Grant   risperiDONE 1 mg Oral Q3H PRN Lilibeth Beckford PA-C   risperiDONE 2 mg Oral HS Doreen Rendon MD   traZODone 50 mg Oral HS PRN Lilibeth Beckford PA-C       1) add pepcid  2) continue treatment otherwise  3) Continue to support patient and engage them in the programs available as feasible and appropriate  Continue case management support and therapy  Continue discharge planning      Risks / Benefits of Treatment:    Risks, benefits, and possible side effects of medications explained to patient and patient verbalizes understanding and agreement for treatment  Counseling / Coordination of Care:    Medication changes reviewed with nursing staff  Medications, treatment progress and treatment plan reviewed with patient        Jayy Kang III, DO  8/17/2019  7:48 AM

## 2019-08-17 NOTE — PROGRESS NOTES
Pt received PRN Zofran 4 mg PO at 1910 for nausea  PRN partially effective per pt, though pt declined to eat at snack  Pt encouraged to drink fluids

## 2019-08-17 NOTE — PROGRESS NOTES
Pt childlike  Pleasant on approach  Remains mostly seclusive to room  Pt went to dining room for breakfast and reports eating a small amount  Denies any episodes of vomiting today  No SI/HI  Encouraged fluids  Pt was provided with water and observed drinking some while writer present  Previous shift reported that pt had difficulty sleeping overnight  Pt does not appear drowsy at this time and has not been sleeping this morning

## 2019-08-18 PROCEDURE — 99232 SBSQ HOSP IP/OBS MODERATE 35: CPT | Performed by: PSYCHIATRY & NEUROLOGY

## 2019-08-18 RX ADMIN — DIVALPROEX SODIUM 1000 MG: 500 TABLET, FILM COATED, EXTENDED RELEASE ORAL at 21:33

## 2019-08-18 RX ADMIN — RISPERIDONE 2 MG: 2 TABLET ORAL at 21:33

## 2019-08-18 RX ADMIN — FAMOTIDINE 20 MG: 20 TABLET ORAL at 09:11

## 2019-08-18 RX ADMIN — FAMOTIDINE 20 MG: 20 TABLET ORAL at 17:20

## 2019-08-18 RX ADMIN — LORAZEPAM 1 MG: 1 TABLET ORAL at 17:22

## 2019-08-18 RX ADMIN — LORATADINE 10 MG: 10 TABLET ORAL at 09:11

## 2019-08-18 NOTE — PROGRESS NOTES
Pt remains seclusive to room  No SI/HI  Denies any episodes of vomiting this morning  Ate breakfast  MHT assisted pt with showering

## 2019-08-18 NOTE — PROGRESS NOTES
Progress Note - [address] [facility] MAITE Alvarado 30 y o  female MRN: @MRN   Unit/Bed#: Dea Carbajal 200-47 Encounter: [id number]    Per nursing report and sign out, patient is seclusive, awake though  No vomiting but dry heaving at times  No SI or HI    Tameka Alvarado reports today that still has some nausea  Depression not a big issue today  Anxiety not a big issues  No SI or HI  Energy: a little better  Sleep: improved  Appetite: poor but maybe improved  Medication side effects: No   ROS: less dizzy, but still sometimes  Nausea there, but not vomiting    Mental Status Evaluation:    Appearance:  disheveled   Behavior:  pleasant, cooperative, with good eye contact   Speech:  Normal volume, regular rate and rhythm   Mood:  euthymic   Affect:  dysphoric, blunted       Thought Process:  circumferential, concrete   Associations: intact associations   Thought Content:  negative thinking and cognitive distortions   Perceptual Disturbances: no auditory or visual hallcunations   Risk Potential: Suicidal ideation - None at present  Homicidal ideation - None  Potential for aggression - No   Sensorium:  A&Ox3   Cognition:  grossly intact   Consciousness:  Alert & Awake   Memory:  recent and remote memory grossly intact   Attention: attention span and concentration appear shorter than expected for age           Insight:  limited   Judgment: limited   Muscle Strength  Muscle Tone normal  normal   Gait/Station: normal gait/station with good balance but slow   Motor Activity: no abnormal movements       Vital signs in last 24 hours:    Temp:  [97 °F (36 1 °C)-97 7 °F (36 5 °C)] 97 °F (36 1 °C)  HR:  [101-103] 103  Resp:  [16] 16  BP: (115-125)/(57-62) 115/57    Laboratory results:  I have personally reviewed all pertinent laboratory/tests results      Assessment/Plan   Principal Problem:    Bipolar 1 disorder, depressed, severe (HCC)  Active Problems:    RADHA (generalized anxiety disorder)    [facility]    Autism spectrum    Tourette's      Simón Johnson is still focused on somatic concerns  Will add ensure to help with PO intake  Recommended Treatment:     Planned medication and treatment changes: All current active medications have been reviewed  Encourage group therapy, milieu therapy and occupational therapy  [address] checks as unit standard unless ordered or noted otherwise      Current Facility-Administered Medications:  acetaminophen 325 mg Oral Q6H PRN NADINE Funez-IVÁN   acetaminophen 975 mg Oral Q6H PRN NADINE Funez-IVÁN   benztropine 1 mg Intramuscular Q6H PRN TonNADINE Ames-IVÁN   benztropine 1 mg Oral Q6H PRN TonNADINE Ames-IVÁN   divalproex sodium 1,000 mg Oral HS Shai Nieves MD   famotidine 20 mg Oral BID Gutierrez Blu III, DO   ibuprofen 600 mg Oral Q8H PRN NADINE Funez-IVÁN   loratadine 10 mg Oral Daily Tonbarb Abreu PA-C   LORazepam 2 mg Intramuscular Q6H PRN NADINE Funez-IVÁN   LORazepam 1 mg Oral Q6H PRN NADINE Funez-IVÁN   magnesium hydroxide 30 mL Oral Daily PRN NADINE Funez-IVÁN   OLANZapine 10 mg Intramuscular Q8H PRN Dashawn Abreu PA-C   OLANZapine 10 mg Oral Q8H PRN Dashawn Abreu PA-C   ondansetron 4 mg Oral Q6H PRN Yoni Grant   risperiDONE 1 mg Oral Q3H PRN NADINE Funez-IVÁN   risperiDONE 2 mg Oral HS Shai Nieves MD   traZODone 50 mg Oral HS PRN Dashawn Abreu PA-C       1) Add Vanilla Ensure TID  2) continue current treatment  3) Continue to support patient and engage them in the programs available as feasible and appropriate  Continue case management support and therapy  Continue discharge planning  Risks / Benefits of Treatment:    Risks, benefits, and possible side effects of medications explained to patient and patient verbalizes understanding and agreement for treatment  Counseling / Coordination of Care:    Medication changes reviewed with nursing staff    Medications, treatment progress and treatment plan reviewed with patient        Brynn Robertson III,   8/18/2019  8:37 AM

## 2019-08-18 NOTE — PROGRESS NOTES
Seclusive to room most of this latter PM  Appropriate upon approach  Reports that she feels somewhat better this evening; only over-heard with dry heaves x 1  Denies SI & HI when asked  Also denying increased anxiety or depression  No A/V hallucinations endorsed  Did not attend group, despite strong encouragement to do so  Denies questions/concerns when asked  Will continue to monitor

## 2019-08-18 NOTE — PROGRESS NOTES
Pt requested and received Ativan 1 mg PO for anxiety at 1722, Benitez score = 17  Pt reports that she is very afraid of thunder  PRN effective

## 2019-08-18 NOTE — PROGRESS NOTES
Pt received Zofran 4 mg PO at 1816 for nausea  Pt heard dry heaving multiple times but denies vomiting  PRN partially effective per pt

## 2019-08-18 NOTE — PROGRESS NOTES
Daily rounds  Pt remains seclusive to room during the day  Per shift report pt slept well overnight  No SI/HI  Slow speech and difficulty expressing her thoughts

## 2019-08-18 NOTE — PLAN OF CARE
Problem: Ineffective Coping  Goal: Identifies ineffective coping skills  Outcome: Progressing  Goal: Identifies healthy coping skills  Outcome: Progressing  Goal: Demonstrates healthy coping skills  Outcome: Progressing  Goal: Patient/Family participate in treatment and DC plans  Description  Interventions:  - Provide therapeutic environment  Outcome: Progressing  Goal: Patient/Family verbalizes awareness of resources  Outcome: Progressing     Problem: Depression  Goal: Treatment Goal: Demonstrate behavioral control of depressive symptoms, verbalize feelings of improved mood/affect, and adopt new coping skills prior to discharge  Outcome: Progressing  Goal: Verbalize thoughts and feelings  Description  Interventions:  - Assess and re-assess patient's level of risk   - Engage patient in 1:1 interactions, daily, for a minimum of 15 minutes   - Encourage patient to express feelings, fears, frustrations, hopes   Outcome: Progressing  Goal: Refrain from harming self  Description  Interventions:  - Monitor patient closely, per order   - Supervise medication ingestion, monitor effects and side effects   Outcome: Progressing  Goal: Refrain from isolation  Description  Interventions:  - Develop a trusting relationship   - Encourage socialization   Outcome: Progressing  Goal: Refrain from self-neglect  Outcome: Progressing  Goal: Attend and participate in unit activities, including therapeutic, recreational, and educational groups  Description  Interventions:  - Provide therapeutic and educational activities daily, encourage attendance and participation, and document same in the medical record   Outcome: Progressing  Goal: Complete daily ADLs, including personal hygiene independently, as able  Description  Interventions:  - Observe, teach, and assist patient with ADLS  -  Monitor and promote a balance of rest/activity, with adequate nutrition and elimination   Outcome: Progressing     Problem: DISCHARGE PLANNING  Goal: Discharge to home or other facility with appropriate resources  Description  INTERVENTIONS:  - Identify barriers to discharge w/patient and caregiver  - Arrange for needed discharge resources and transportation as appropriate  - Identify discharge learning needs (meds, wound care, etc )  - Arrange for interpretive services to assist at discharge as needed  - Refer to Case Management Department for coordinating discharge planning if the patient needs post-hospital services based on physician/advanced practitioner order or complex needs related to functional status, cognitive ability, or social support system  Outcome: Progressing

## 2019-08-19 LAB
ALBUMIN SERPL BCP-MCNC: 3.9 G/DL (ref 3.5–5)
ALP SERPL-CCNC: 74 U/L (ref 46–116)
ALT SERPL W P-5'-P-CCNC: 58 U/L (ref 12–78)
ANION GAP SERPL CALCULATED.3IONS-SCNC: 12 MMOL/L (ref 4–13)
AST SERPL W P-5'-P-CCNC: 39 U/L (ref 5–45)
BASOPHILS # BLD AUTO: 0 THOUSANDS/ΜL (ref 0–0.1)
BASOPHILS NFR BLD AUTO: 0 % (ref 0–1)
BILIRUB SERPL-MCNC: 0.4 MG/DL (ref 0.2–1)
BUN SERPL-MCNC: 12 MG/DL (ref 5–25)
CALCIUM SERPL-MCNC: 9.7 MG/DL (ref 8.3–10.1)
CHLORIDE SERPL-SCNC: 101 MMOL/L (ref 100–108)
CO2 SERPL-SCNC: 26 MMOL/L (ref 21–32)
CREAT SERPL-MCNC: 0.69 MG/DL (ref 0.6–1.3)
EOSINOPHIL # BLD AUTO: 0 THOUSAND/ΜL (ref 0–0.61)
EOSINOPHIL NFR BLD AUTO: 0 % (ref 0–6)
ERYTHROCYTE [DISTWIDTH] IN BLOOD BY AUTOMATED COUNT: 12 % (ref 11.6–15.1)
GFR SERPL CREATININE-BSD FRML MDRD: 121 ML/MIN/1.73SQ M
GLUCOSE P FAST SERPL-MCNC: 102 MG/DL (ref 65–99)
GLUCOSE SERPL-MCNC: 102 MG/DL (ref 65–140)
HCG SERPL QL: NEGATIVE
HCT VFR BLD AUTO: 44.7 % (ref 34.8–46.1)
HGB BLD-MCNC: 15 G/DL (ref 11.5–15.4)
IMM GRANULOCYTES # BLD AUTO: 0.01 THOUSAND/UL (ref 0–0.2)
IMM GRANULOCYTES NFR BLD AUTO: 0 % (ref 0–2)
LYMPHOCYTES # BLD AUTO: 1.48 THOUSANDS/ΜL (ref 0.6–4.47)
LYMPHOCYTES NFR BLD AUTO: 40 % (ref 14–44)
MCH RBC QN AUTO: 32.3 PG (ref 26.8–34.3)
MCHC RBC AUTO-ENTMCNC: 33.6 G/DL (ref 31.4–37.4)
MCV RBC AUTO: 96 FL (ref 82–98)
MONOCYTES # BLD AUTO: 0.32 THOUSAND/ΜL (ref 0.17–1.22)
MONOCYTES NFR BLD AUTO: 9 % (ref 4–12)
NEUTROPHILS # BLD AUTO: 1.9 THOUSANDS/ΜL (ref 1.85–7.62)
NEUTS SEG NFR BLD AUTO: 51 % (ref 43–75)
NRBC BLD AUTO-RTO: 0 /100 WBCS
PLATELET # BLD AUTO: 164 THOUSANDS/UL (ref 149–390)
PMV BLD AUTO: 9 FL (ref 8.9–12.7)
POTASSIUM SERPL-SCNC: 3.7 MMOL/L (ref 3.5–5.3)
PROT SERPL-MCNC: 7.2 G/DL (ref 6.4–8.2)
RBC # BLD AUTO: 4.65 MILLION/UL (ref 3.81–5.12)
SODIUM SERPL-SCNC: 139 MMOL/L (ref 136–145)
TSH SERPL DL<=0.05 MIU/L-ACNC: 2.75 UIU/ML (ref 0.36–3.74)
VALPROATE SERPL-MCNC: 101 UG/ML (ref 50–100)
WBC # BLD AUTO: 3.71 THOUSAND/UL (ref 4.31–10.16)

## 2019-08-19 PROCEDURE — 85025 COMPLETE CBC W/AUTO DIFF WBC: CPT | Performed by: PSYCHIATRY & NEUROLOGY

## 2019-08-19 PROCEDURE — 99232 SBSQ HOSP IP/OBS MODERATE 35: CPT | Performed by: PSYCHIATRY & NEUROLOGY

## 2019-08-19 PROCEDURE — 84703 CHORIONIC GONADOTROPIN ASSAY: CPT | Performed by: PSYCHIATRY & NEUROLOGY

## 2019-08-19 PROCEDURE — 80053 COMPREHEN METABOLIC PANEL: CPT | Performed by: PSYCHIATRY & NEUROLOGY

## 2019-08-19 PROCEDURE — 80164 ASSAY DIPROPYLACETIC ACD TOT: CPT | Performed by: PSYCHIATRY & NEUROLOGY

## 2019-08-19 PROCEDURE — 84443 ASSAY THYROID STIM HORMONE: CPT | Performed by: PSYCHIATRY & NEUROLOGY

## 2019-08-19 RX ADMIN — RISPERIDONE 2 MG: 2 TABLET ORAL at 21:52

## 2019-08-19 RX ADMIN — FAMOTIDINE 20 MG: 20 TABLET ORAL at 17:27

## 2019-08-19 RX ADMIN — ONDANSETRON 4 MG: 4 TABLET, ORALLY DISINTEGRATING ORAL at 16:01

## 2019-08-19 RX ADMIN — DIVALPROEX SODIUM 750 MG: 500 TABLET, FILM COATED, EXTENDED RELEASE ORAL at 21:52

## 2019-08-19 RX ADMIN — FAMOTIDINE 20 MG: 20 TABLET ORAL at 09:29

## 2019-08-19 RX ADMIN — LORATADINE 10 MG: 10 TABLET ORAL at 09:29

## 2019-08-19 NOTE — PROGRESS NOTES
Status: Pt continued with vomiting over the weekend & received Zofran  Pt reports decreased appetite, & was observed dry heaving this morning  Pt is seclusive to her room  Medication: prn Ativan administered last evening due to Pt being scared of the thunder  D/C:  No medical criteria as of tomorrow, however, Pt's mom is stating she cannot return home  CM has not had a chance to speak directly to Pt's TCM regarding housing referrals that have been made        08/19/19 0834   Team Meeting   Meeting Type Daily Rounds   Team Members Present   Team Members Present Physician;Nurse;;Occupational Therapist   Physician Team Member Dr Marek Elkins / Uzma Chin Team Member [facility]   Care Management Team Member Asim Sabillon / Luisana Beaver   OT Team Member Gordon   Patient/Family Present   Patient Present No   Patient's Family Present No

## 2019-08-19 NOTE — PROGRESS NOTES
Psychiatric Evaluation [address]  Jenny 30 y o  female MRN: [id number]  Unit/Bed#: [facility] 255-02 Encounter: [id number]    Assessment/Plan   Principal Problem:    Bipolar 1 disorder, depressed, severe ([facility] )  Active Problems:    RADHA (generalized anxiety disorder)    TBI (traumatic brain injury) ([facility] )    Autism spectrum    Tourette's    Plan:   Decrease Depakote given supratherapeutic level, 750 mg HS, for mood stabilization  Recheck Depakote level in 3 days  Continue risperidone 2 mg HS for management of impulsivity; will consider switching to Invega sustaina on discharge  Continue Pepcid 20 mg b i d  For management of nausea  Morning labsshowing decline in WBC, will recheck in 3 days, likely secondary to combination of Depakote and risperidone, will monitor    Treatment options and alternatives were reviewed with the patient, who concurs with the above plan  Risks, benefits, and possible side effects of medications were explained to the patient, and she verbalizes understanding       -----------------------------------    Subjective: [address] has been compliant with medications  she reports nausea and dry heaving this morning, stating that she was only able to eat a few crackers at breakfast   She also reports dizziness and low fluid intake  Encouraged patient to increase fluids and p  O  Intake as tolerated to prevent lightheadedness  She reports good sleep overnight with stable mood  Patient is consenting for safety on the unit      Psychiatric Review of Systems:  Behavior over the last 24 hours: improved  Sleep: improved  Appetite: low  Medication side effects: none  ROS: nausea, GI upset, fatigue, anxiety      Meds/Allergies   all current active meds have been reviewed and current meds:   Current Facility-Administered Medications   Medication Dose Route Frequency    acetaminophen (TYLENOL) tablet 325 mg  325 mg Oral Q6H PRN    acetaminophen (TYLENOL) tablet 975 mg  975 mg Oral Q6H PRN    benztropine (COGENTIN) injection 1 mg  1 mg Intramuscular Q6H PRN    benztropine (COGENTIN) tablet 1 mg  1 mg Oral Q6H PRN    divalproex sodium (DEPAKOTE ER) 24 hr tablet 1,000 mg  1,000 mg Oral HS    famotidine (PEPCID) tablet 20 mg  20 mg Oral BID    ibuprofen (MOTRIN) tablet 600 mg  600 mg Oral Q8H PRN    loratadine (CLARITIN) tablet 10 mg  10 mg Oral Daily    LORazepam (ATIVAN) 2 mg/mL injection 2 mg  2 mg Intramuscular Q6H PRN    LORazepam (ATIVAN) tablet 1 mg  1 mg Oral Q6H PRN    magnesium hydroxide (MILK OF MAGNESIA) 400 mg/5 mL oral suspension 30 mL  30 mL Oral Daily PRN    OLANZapine (ZyPREXA) IM injection 10 mg  10 mg Intramuscular Q8H PRN    OLANZapine (ZyPREXA) tablet 10 mg  10 mg Oral Q8H PRN    ondansetron (ZOFRAN-ODT) dispersible tablet 4 mg  4 mg Oral Q6H PRN    risperiDONE (RisperDAL M-TABS) dispersible tablet 1 mg  1 mg Oral Q3H PRN    risperiDONE (RisperDAL) tablet 2 mg  2 mg Oral HS    traZODone (DESYREL) tablet 50 mg  50 mg Oral HS PRN     Allergies   Allergen Reactions    Eggs Or Egg-Derived Products     Fdc Yellow [Yellow Dye]        Objective    Vital signs in last 24 hours:  Temp:  [97 4 °F (36 3 °C)] 97 4 °F (36 3 °C)  HR:  [99] 99  Resp:  [18] 18  BP: (128)/(67) 128/67    No intake or output data in the 24 hours ending 08/19/19 1210    Mental Status Evaluation:  Appearance:  alert, casually dressed, appears stated age, appropriate grooming and hygiene, overweight and short curly blonde hair,    Behavior:  cooperative, pleasant, sitting comfortably in chair, fair eye contact, no abnormal movements and normal gait and balance   Speech:  spontaneous, clear, normal rate, soft and coherent   Mood:  dysphoric, anxious and decreased range   Affect:  mood-congruent, constricted, dysphoric and anxious   Thought Process:  coherent, perseverative, normal rate of thoughts   Thought Content: no verbalized delusions, no overt paranoia, ruminating thoughts, somatic preoccupation Perceptual disturbances: no auditory hallucinations and no visual hallucinations   Risk Potential: Passive death wishes, No active homicidal ideation, Low potential for aggression   Cognition: oriented to person, place, time, and situation, appears to be below average intelligence, impaired abstract reasoning and attention span appeared shorter than expected for age   Insight:  Limited   Judgment: [address]       Laboratory results:   I have personally reviewed all pertinent laboratory/tests results    Most Recent Labs:   Lab Results   Component Value Date    WBC 3 71 (L) 08/19/2019    RBC 4 65 08/19/2019    HGB 15 0 08/19/2019    HCT 44 7 08/19/2019     08/19/2019    RDW 12 0 08/19/2019    NEUTROABS 1 90 08/19/2019    SODIUM 139 08/19/2019    K 3 7 08/19/2019     08/19/2019    CO2 26 08/19/2019    BUN 12 08/19/2019    CREATININE 0 69 08/19/2019    GLUCOSE 87 09/23/2015    GLUC 102 08/19/2019    GLUF 102 (H) 08/19/2019    CALCIUM 9 7 08/19/2019    AST 39 08/19/2019    ALT 58 08/19/2019    ALKPHOS 74 08/19/2019    TP 7 2 08/19/2019    ALB 3 9 08/19/2019    TBILI 0 40 08/19/2019    CHOLESTEROL 180 07/16/2019    HDL 59 07/16/2019    TRIG 176 (H) 07/16/2019    LDLCALC 86 07/16/2019    NONHDLC 121 07/16/2019    VALPROICTOT 101 (H) 08/19/2019    AMMONIA <10 (L) 08/16/2019    LNQ8UVBHZDNH 2 755 08/19/2019    FREET4 0 68 (L) 06/12/2019    T3FREE 2 44 06/12/2019    PREGSERUM Negative 08/19/2019    RPR Non-Reactive 06/13/2019    HGBA1C 5 3 06/13/2019     06/13/2019       Imaging Studies:   No orders to display
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
## 2019-08-19 NOTE — PROGRESS NOTES
Pt cont's to have somatic c/o mostly nausea and feeling weak  Pt states she cannot eat  Ate no bfst but did eat 75% of lunch  Responds to reassurance from staff  Cooperative with medications  Noted to be OOR a little more this shift
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
## 2019-08-19 NOTE — PROGRESS NOTES
Pt is seclusive to room, mostly sitting on bed  Pleasant and talkative during interaction  Focused on somatic complaints  States she was unable to eat very much over the weekend  Receiving Zofran PRN for nausea  Provided with ice chips and Ensure from dinner tray  Pt was also assisted to use phone to call her mother  States to staff she would like to learn more life skills

## 2019-08-20 PROCEDURE — 99232 SBSQ HOSP IP/OBS MODERATE 35: CPT | Performed by: PSYCHIATRY & NEUROLOGY

## 2019-08-20 RX ADMIN — FAMOTIDINE 20 MG: 20 TABLET ORAL at 17:30

## 2019-08-20 RX ADMIN — FAMOTIDINE 20 MG: 20 TABLET ORAL at 09:22

## 2019-08-20 RX ADMIN — RISPERIDONE 2 MG: 2 TABLET ORAL at 21:52

## 2019-08-20 RX ADMIN — LORATADINE 10 MG: 10 TABLET ORAL at 09:22

## 2019-08-20 RX ADMIN — DIVALPROEX SODIUM 750 MG: 500 TABLET, FILM COATED, EXTENDED RELEASE ORAL at 21:52

## 2019-08-20 NOTE — PROGRESS NOTES
Psychiatric Evaluation - [id number] [place] [employer] Jenny 30 y o  female MRN: [id number]  Unit/Bed#: [facility] 255-02 Encounter: [id number]    Assessment/Plan   Principal Problem:    Bipolar 1 disorder, depressed, severe ([facility] 75 )  Active Problems:    RADHA (generalized anxiety disorder)    TBI (traumatic brain injury) ([facility] 75 )    Autism spectrum    Tourette's    Plan:   Continue Decrease Depakote, 750 mg HS, for mood stabilization  Recheck Depakote level on Thursday morning  Continue risperidone 2 mg HS for management of impulsivity  Continue Pepcid 20 mg b i d  For management of nausea    Treatment options and alternatives were reviewed with the patient, who concurs with the above plan  Risks, benefits, and possible side effects of medications were explained to the patient, and she verbalizes understanding       -----------------------------------    Subjective: Vanita Gannon has been compliant with medications  she reports no nausea or dry heaving this morning, stating that she will try to eat more than she has been at breakfast  She reports increased fluid intake She reports good sleep overnight with stable mood  She states she would like to return home with her mother on discharge  Patient is consenting for safety on the unit      Psychiatric Review of Systems:  Behavior over the last 24 hours: improved  Sleep: improved  Appetite: low  Medication side effects: none  ROS: anxiety      Meds/Allergies   all current active meds have been reviewed and current meds:   Current Facility-Administered Medications   Medication Dose Route Frequency    acetaminophen (TYLENOL) tablet 325 mg  325 mg Oral Q6H PRN    acetaminophen (TYLENOL) tablet 975 mg  975 mg Oral Q6H PRN    benztropine (COGENTIN) injection 1 mg  1 mg Intramuscular Q6H PRN    benztropine (COGENTIN) tablet 1 mg  1 mg Oral Q6H PRN    divalproex sodium (DEPAKOTE ER) 24 hr tablet 750 mg  750 mg Oral HS    famotidine (PEPCID) tablet 20 mg  20 mg Oral BID    ibuprofen (MOTRIN) tablet 600 mg  600 mg Oral Q8H PRN    loratadine (CLARITIN) tablet 10 mg  10 mg Oral Daily    LORazepam (ATIVAN) 2 mg/mL injection 2 mg  2 mg Intramuscular Q6H PRN    LORazepam (ATIVAN) tablet 1 mg  1 mg Oral Q6H PRN    magnesium hydroxide (MILK OF MAGNESIA) 400 mg/5 mL oral suspension 30 mL  30 mL Oral Daily PRN    OLANZapine (ZyPREXA) IM injection 10 mg  10 mg Intramuscular Q8H PRN    OLANZapine (ZyPREXA) tablet 10 mg  10 mg Oral Q8H PRN    ondansetron (ZOFRAN-ODT) dispersible tablet 4 mg  4 mg Oral Q6H PRN    risperiDONE (RisperDAL M-TABS) dispersible tablet 1 mg  1 mg Oral Q3H PRN    risperiDONE (RisperDAL) tablet 2 mg  2 mg Oral HS    traZODone (DESYREL) tablet 50 mg  50 mg Oral HS PRN     Allergies   Allergen Reactions    Eggs Or Egg-Derived Products     Fdc Yellow [Yellow Dye]        Objective    Vital signs in last 24 hours:  Temp:  [97 5 °F (36 4 °C)-97 7 °F (36 5 °C)] 97 7 °F (36 5 °C)  HR:  [] 87  Resp:  [18] 18  BP: (110-115)/(62-63) 110/62    No intake or output data in the 24 hours ending 08/20/19 0952    Mental Status Evaluation:  Appearance:  alert, casually dressed, appears stated age, appropriate grooming and hygiene, overweight and short curly blonde hair,    Behavior:  cooperative, pleasant, sitting comfortably in chair, fair eye contact, no abnormal movements and normal gait and balance   Speech:  spontaneous, clear, normal rate, soft and coherent   Mood:  dysphoric, anxious and decreased range   Affect:  mood-congruent, constricted, dysphoric and anxious   Thought Process:  coherent, perseverative, normal rate of thoughts   Thought Content: no verbalized delusions, no overt paranoia, ruminating thoughts, somatic preoccupation   Perceptual disturbances: no auditory hallucinations and no visual hallucinations   Risk Potential: Passive death wishes, No active homicidal ideation, Low potential for aggression   Cognition: oriented to person, place, time, and situation, appears to be below average intelligence, impaired abstract reasoning and attention span appeared shorter than expected for age   Insight:  Limited   Judgment: [address]       Laboratory results:   I have personally reviewed all pertinent laboratory/tests results    Most Recent Labs:   Lab Results   Component Value Date    WBC 3 71 (L) 08/19/2019    RBC 4 65 08/19/2019    HGB 15 0 08/19/2019    HCT 44 7 08/19/2019     08/19/2019    RDW 12 0 08/19/2019    NEUTROABS 1 90 08/19/2019    SODIUM 139 08/19/2019    K 3 7 08/19/2019     08/19/2019    CO2 26 08/19/2019    BUN 12 08/19/2019    CREATININE 0 69 08/19/2019    GLUCOSE 87 09/23/2015    GLUC 102 08/19/2019    GLUF 102 (H) 08/19/2019    CALCIUM 9 7 08/19/2019    AST 39 08/19/2019    ALT 58 08/19/2019    ALKPHOS 74 08/19/2019    TP 7 2 08/19/2019    ALB 3 9 08/19/2019    TBILI 0 40 08/19/2019    CHOLESTEROL 180 07/16/2019    HDL 59 07/16/2019    TRIG 176 (H) 07/16/2019    LDLCALC 86 07/16/2019    NONHDLC 121 07/16/2019    VALPROICTOT 101 (H) 08/19/2019    AMMONIA <10 (L) 08/16/2019    SVC8EEMFTCXX 2 755 08/19/2019    FREET4 0 68 (L) 06/12/2019    T3FREE 2 44 06/12/2019    PREGSERUM Negative 08/19/2019    RPR Non-Reactive 06/13/2019    HGBA1C 5 3 06/13/2019     06/13/2019       Imaging Studies:   No orders to display

## 2019-08-20 NOTE — PLAN OF CARE
Pt reporting improvement in her depression, however, her mom is now in the hospital & Pt cannot return home alone

## 2019-08-20 NOTE — PLAN OF CARE
Problem: Ineffective Coping  Goal: Identifies ineffective coping skills  Outcome: Progressing  Goal: Identifies healthy coping skills  Outcome: Progressing  Goal: Demonstrates healthy coping skills  Outcome: Progressing  Goal: Participates in unit activities  Description  Interventions:  - Provide therapeutic environment   - Provide required programming   - Redirect inappropriate behaviors   Outcome: Progressing  Goal: Patient/Family participate in treatment and DC plans  Description  Interventions:  - Provide therapeutic environment  Outcome: Progressing  Goal: Patient/Family verbalizes awareness of resources  Outcome: Progressing     Problem: Depression  Goal: Treatment Goal: Demonstrate behavioral control of depressive symptoms, verbalize feelings of improved mood/affect, and adopt new coping skills prior to discharge  Outcome: Progressing  Goal: Verbalize thoughts and feelings  Description  Interventions:  - Assess and re-assess patient's level of risk   - Engage patient in 1:1 interactions, daily, for a minimum of 15 minutes   - Encourage patient to express feelings, fears, frustrations, hopes   Outcome: Progressing  Goal: Refrain from harming self  Description  Interventions:  - Monitor patient closely, per order   - Supervise medication ingestion, monitor effects and side effects   Outcome: Progressing  Goal: Refrain from isolation  Description  Interventions:  - Develop a trusting relationship   - Encourage socialization   Outcome: Progressing  Goal: Refrain from self-neglect  Outcome: Progressing  Goal: Attend and participate in unit activities, including therapeutic, recreational, and educational groups  Description  Interventions:  - Provide therapeutic and educational activities daily, encourage attendance and participation, and document same in the medical record   Outcome: Progressing  Goal: Complete daily ADLs, including personal hygiene independently, as able  Description  Interventions:  - Observe, teach, and assist patient with ADLS  -  Monitor and promote a balance of rest/activity, with adequate nutrition and elimination   Outcome: Progressing     Problem: Risk for Violence/Aggression Toward Others  Goal: Treatment Goal: Refrain from acts of violence/aggression during length of stay, and demonstrate improved impulse control at the time of discharge  Outcome: Progressing  Goal: Refrain from harming others  Outcome: Progressing

## 2019-08-20 NOTE — PROGRESS NOTES
Status: Pt seculsive to her room, sits on her bed & just stares  Pt has been selective with her meals & was awake until 12:30 AM, but then slept     Medications: Depakote decreased, prn Zofran given in the afternoon  D/C: Thursday, after Depakote level check     08/20/19 3906   Team Meeting   Meeting Type Daily Rounds   Team Members Present   Team Members Present Physician;Nurse;;Occupational Therapist   Physician Team Member Dr Zach Whitaker / Irais Israel Team Member Ankush Rodriguez / Sherrell Silva Management Team Member Bari Boggs / Donald Mora   OT Team Member Gordon   Patient/Family Present   Patient Present No   Patient's Family Present No

## 2019-08-20 NOTE — PROGRESS NOTES
Pt was awake in early part of shift, sitting up in bed  Denies needs, declined PRN sleep aid, water, snack  Pt was later observed sleeping and has appeared to sleep throughout the night

## 2019-08-20 NOTE — PROGRESS NOTES
Pt observed sitting in bed throughout morning  Scant during interaction  Denies any somatic complaints  No SI/HI  Calm during conversation  Disheveled appearance  Denies any questions/concerns

## 2019-08-20 NOTE — CASE MANAGEMENT
CM met with Pt to talk about PHP & she isn't really interested in this  Pt agreed that she would like to get out of the house during the day & said maybe find a job, but she doesn't know if she could because she has never worked  CM mentioned [facility] & Pt said that they tried to work with [facility], however, they only had 1 worker that was willing to work with Pt & she was in a wheelchair & had issues with getting over the step into the home  Pt said that her mom got fed-up with all the issues/excusses & ended services  CM asked if Pt has ever stayed with another family member or friend & she said no  She said in the past if her mom got sick & needed to go away, her dad was there to care for her  Pt said that she has an [employer], however, contacting her sometimes upsets Pt's mom  Pt said that her [employer] will come into the home & try to run things  Pt said that she also then feels like she is in the middle of the two sisters(Aunt [address] )  CM inquired if Pt has spoken with her mom & she said no, she has tried to call a few times but she hasn't answered  CM asked if there was a cell phone number for mom, but Pt said her mom has an emergency cell phone, but keeps it turned off unless there is an emergency  Pt reporting she is still having nausea, but is keeping food down now & was able to get to a group  Pt agreeable to continue to work with the treatment team on her aftercare plans

## 2019-08-20 NOTE — CASE MANAGEMENT
CM received a phone call from [facility] MH/DS TCM supervisor, Tiffanie Francisco, who reported they had been made aware that Pt's mom is now in the hospital  Ya Jimenez said that they were concerned that Pt would be discharged today & she has never been alone & do they do feel she can be alone in the home  CM said that she would outreach to [facility] to see if a stepdown to crisis residence may be an options  CM contacted [employer] @ [phone] & reached voicemail for [place] Nena Joaquin, which stated she was out of the office until Tues(8/27)  CM dialed 0 for the  & left a message seeking a returned call as soon as possible  CM contacted [facility] MH/DS TCM supervisor, Ya Jimenez, @ [phone] & left a message to inquire who is the emergency contact for Pt & what has happened in the past if Pt's mother required emergency care  CM contacted [facility] @ [phone] & spoke with King Kruse about possible need for stepdown to crisis res, as Pt is stable enough for discharge, however, her mother is now in the hospital   King Kruse said that did not present a mental health crisis & it would not be an appropriate referral      CM contacted Pt's mom, Luanne, @ [phone] but reached voicmeail  CM left a message seeking to provide an update on Pt & requesting a returned call

## 2019-08-20 NOTE — PROGRESS NOTES
Orthopedics Pt remains seclusive to room throughout afternoon  Pt out of bed throughout day to attend groups then returned to room  Scant during interaction  Ate meals in room and observed to be drinking ensure as well  Pt denies all symptoms when asked  Calm and pleasant at this time

## 2019-08-20 NOTE — CASE MANAGEMENT
AQUILES received a return call from [facility], José Miguel Ordonez, who reported that Pt's mom does not receive any compensation for having Pt in the home  She said that when Pt was kicked out of the TBI facility for her behavior, Pt's mom took her back on an emergency type basis  José Miguel Ordonez said that they have been working on placement, however, have not found anything for Pt  José Miguel Ordonez said that no TBI facilities will take Pt because they say they feel her mental health is not stable, and non of the mental health facilities will place her because of her TBI  Pt is not eligible for DS services because the TBI occurred after Pt's 19st birthday  José Miguel Ordonez said that they have outreached to a [facility] in the area that typically will work with their patients, but they declined the referral  CM agreed to keep working on identifying options for Pt & asked if José Miguel Ordonez had a cell phone number for Pt's mom, as CM only had the house number, but she did not  CM contacted Scar Solis  Marguerite Merino [phone] & left a message for the admission's department to inquire if they accept OBRA waiver

## 2019-08-21 PROCEDURE — 99231 SBSQ HOSP IP/OBS SF/LOW 25: CPT | Performed by: PSYCHIATRY & NEUROLOGY

## 2019-08-21 RX ADMIN — LORATADINE 10 MG: 10 TABLET ORAL at 09:46

## 2019-08-21 RX ADMIN — FAMOTIDINE 20 MG: 20 TABLET ORAL at 09:46

## 2019-08-21 RX ADMIN — FAMOTIDINE 20 MG: 20 TABLET ORAL at 17:59

## 2019-08-21 RX ADMIN — DIVALPROEX SODIUM 750 MG: 500 TABLET, FILM COATED, EXTENDED RELEASE ORAL at 21:32

## 2019-08-21 RX ADMIN — RISPERIDONE 2 MG: 2 TABLET ORAL at 21:33

## 2019-08-21 NOTE — CASE MANAGEMENT
CM returned a phone call to Renee Zhao, , at [facility] @ [phone]  He was not familiar with Pt's case, but reported that a note entered on 8/12 reported that Valerie Brody from [facility] MH/DS had called & said that Pt would like to switch providers for supports coordination  A list of providers was sent to Valerie Brody from Pt to choose from  Nubia Booth scanned through previous notes but said that no plan was initiated for this esthela year; previous plan ended December 31, 2018

## 2019-08-21 NOTE — PROGRESS NOTES
Pt reports poor night sleep last night because she has a new roommate and she has to get used to her and also she snores  Pt states she feels better and hopes she can be discharged soon  She remains calm and cooperative  Denies any desire to hurt others  No mood lability or angry outbursts  She has been appropriate on the unit, seclusive to her room much of the time, sitting on the edge of her bed  No somatic complaints so far this shift

## 2019-08-21 NOTE — PROGRESS NOTES
Psychiatric Evaluation - [address] [place] [employer]  Jenny 30 y o  female MRN: [id number]  Unit/Bed#: [facility] 255-02 Encounter: [id number]    Assessment/Plan   Principal Problem:    Bipolar 1 disorder, depressed, severe ([facility] 75 )  Active Problems:    RADHA (generalized anxiety disorder)    TBI (traumatic brain injury) ([facility] 75 )    Autism spectrum    Tourette's    Plan:   Continue Decrease Depakote, 750 mg HS, for mood stabilization  Recheck Depakote level on Thursday morning  Continue risperidone 2 mg HS for management of impulsivity  Continue Pepcid 20 mg b i d  For management of nausea    Treatment options and alternatives were reviewed with the patient, who concurs with the above plan  Risks, benefits, and possible side effects of medications were explained to the patient, and she verbalizes understanding       -----------------------------------    Subjective: Bhavesh Kennedy has been compliant with medications  she reports no nausea while at lunch today  She denies nausea or dry heaving this morning, stating that she was able to eat well at breakfast  She reports increased fluid intake She reports poor sleep overnight due to her new roommates   She reports stable mood  She states she would like to return home with her mother on discharge  Patient is consenting for safety on the unit  Psychiatric Review of Systems:  Behavior over the last 24 hours: improved  Sleep: improved  Appetite: Improved  Medication side effects: none  ROS: Complete review of systems is negative except as noted above        Meds/Allergies   all current active meds have been reviewed and current meds:   Current Facility-Administered Medications   Medication Dose Route Frequency    acetaminophen (TYLENOL) tablet 325 mg  325 mg Oral Q6H PRN    acetaminophen (TYLENOL) tablet 975 mg  975 mg Oral Q6H PRN    benztropine (COGENTIN) injection 1 mg  1 mg Intramuscular Q6H PRN    benztropine (COGENTIN) tablet 1 mg  1 mg Oral Q6H PRN    divalproex sodium (DEPAKOTE ER) 24 hr tablet 750 mg  750 mg Oral HS    famotidine (PEPCID) tablet 20 mg  20 mg Oral BID    ibuprofen (MOTRIN) tablet 600 mg  600 mg Oral Q8H PRN    loratadine (CLARITIN) tablet 10 mg  10 mg Oral Daily    LORazepam (ATIVAN) 2 mg/mL injection 2 mg  2 mg Intramuscular Q6H PRN    LORazepam (ATIVAN) tablet 1 mg  1 mg Oral Q6H PRN    magnesium hydroxide (MILK OF MAGNESIA) 400 mg/5 mL oral suspension 30 mL  30 mL Oral Daily PRN    OLANZapine (ZyPREXA) IM injection 10 mg  10 mg Intramuscular Q8H PRN    OLANZapine (ZyPREXA) tablet 10 mg  10 mg Oral Q8H PRN    ondansetron (ZOFRAN-ODT) dispersible tablet 4 mg  4 mg Oral Q6H PRN    risperiDONE (RisperDAL M-TABS) dispersible tablet 1 mg  1 mg Oral Q3H PRN    risperiDONE (RisperDAL) tablet 2 mg  2 mg Oral HS    traZODone (DESYREL) tablet 50 mg  50 mg Oral HS PRN     Allergies   Allergen Reactions    Eggs Or Egg-Derived Products     Fdc Yellow [Yellow Dye]        Objective    Vital signs in last 24 hours:  Temp:  [97 3 °F (36 3 °C)-97 9 °F (36 6 °C)] 97 9 °F (36 6 °C)  HR:  [88-97] 88  Resp:  [17] 17  BP: (115-135)/(80) 115/80    No intake or output data in the 24 hours ending 08/21/19 1235    Mental Status Evaluation:  Appearance:  alert, casually dressed, appears stated age, appropriate grooming and hygiene, overweight and short curly blonde hair,    Behavior:  cooperative, pleasant, sitting comfortably in chair, fair eye contact, no abnormal movements and normal gait and balance   Speech:  spontaneous, clear, normal rate, soft and coherent   Mood:  dysphoric, anxious and decreased range   Affect:  mood-congruent, constricted, dysphoric and anxious   Thought Process:  coherent, perseverative, normal rate of thoughts   Thought Content: no verbalized delusions, no overt paranoia, ruminating thoughts, somatic preoccupation   Perceptual disturbances: no auditory hallucinations and no visual hallucinations   Risk Potential: Passive death wishes, No active homicidal ideation, Low potential for aggression   Cognition: oriented to person, place, time, and situation, appears to be below average intelligence, impaired abstract reasoning and attention span appeared shorter than expected for age   Insight:  Limited   Judgment: [address]       Laboratory results:   I have personally reviewed all pertinent laboratory/tests results    Most Recent Labs:   Lab Results   Component Value Date    WBC 3 71 (L) 08/19/2019    RBC 4 65 08/19/2019    HGB 15 0 08/19/2019    HCT 44 7 08/19/2019     08/19/2019    RDW 12 0 08/19/2019    NEUTROABS 1 90 08/19/2019    SODIUM 139 08/19/2019    K 3 7 08/19/2019     08/19/2019    CO2 26 08/19/2019    BUN 12 08/19/2019    CREATININE 0 69 08/19/2019    GLUCOSE 87 09/23/2015    GLUC 102 08/19/2019    GLUF 102 (H) 08/19/2019    CALCIUM 9 7 08/19/2019    AST 39 08/19/2019    ALT 58 08/19/2019    ALKPHOS 74 08/19/2019    TP 7 2 08/19/2019    ALB 3 9 08/19/2019    TBILI 0 40 08/19/2019    CHOLESTEROL 180 07/16/2019    HDL 59 07/16/2019    TRIG 176 (H) 07/16/2019    LDLCALC 86 07/16/2019    NONHDLC 121 07/16/2019    VALPROICTOT 101 (H) 08/19/2019    AMMONIA <10 (L) 08/16/2019    [id number] 2 755 08/19/2019    FREET4 0 68 (L) 06/12/2019    T3FREE 2 44 06/12/2019    PREGSERUM Negative 08/19/2019    RPR Non-Reactive 06/13/2019    HGBA1C 5 3 06/13/2019     06/13/2019       Imaging Studies:   No orders to display

## 2019-08-21 NOTE — CASE MANAGEMENT
CM received a voicemail from a woman who identified herself as Pt's aunt  She said that Pt's mom was in the hospital & not available for a few days  She said that messages could be left at [phone]

## 2019-08-21 NOTE — CASE MANAGEMENT
CM contacted [facility] MH/DS CHIPPS Coordintor, Carmen Obregon, @ [phone] & left a message seeking a returned call  CM received a call from [facility] MH/DS TCM Supervisor, Paty Martinez, who reported that she outreached to [employer] yesterday & left a message & has not received a response yet either  CM reported no updates but that Pt was doing well on the unit  CM returned a phone call to Renee at [facility], [facility] @ [phone] & left a message for the , Nuzhat Brito, seeking a returned call about referring Pt to [facility]  Marguerite [employer]

## 2019-08-21 NOTE — PROGRESS NOTES
Status: Scant, seclussive, pleasant  Pt was out for groups, drank ensure, & slept overnight  Medication: No changes  D/C: Thurs(?) CM notified yesterday that Pt's mom is in the hospital; it is unknown what for  Crisis res will not take her  The supports coordinator is out of the office until Tues, but messages were left with the  for someone to call CM to see about respite  08/21/19 0730   Team Meeting   Meeting Type Daily Rounds   Team Members Present   Team Members Present Physician;Nurse;; Occupational Therapist   Physician Team Member Dr Ailyn Kong / Trinity Johnson Team Member Hercules Homans / Paty Chadwick Management Team Member Corie Salazar / Millie Singh   OT Team Member Gordon   Patient/Family Present   Patient Present No   Patient's Family Present No

## 2019-08-22 LAB
BASOPHILS # BLD AUTO: 0.01 THOUSANDS/ΜL (ref 0–0.1)
BASOPHILS NFR BLD AUTO: 0 % (ref 0–1)
EOSINOPHIL # BLD AUTO: 0 THOUSAND/ΜL (ref 0–0.61)
EOSINOPHIL NFR BLD AUTO: 0 % (ref 0–6)
ERYTHROCYTE [DISTWIDTH] IN BLOOD BY AUTOMATED COUNT: 11.9 % (ref 11.6–15.1)
HCT VFR BLD AUTO: 44 % (ref 34.8–46.1)
HGB BLD-MCNC: 14.6 G/DL (ref 11.5–15.4)
IMM GRANULOCYTES # BLD AUTO: 0.02 THOUSAND/UL (ref 0–0.2)
IMM GRANULOCYTES NFR BLD AUTO: 0 % (ref 0–2)
LYMPHOCYTES # BLD AUTO: 1.72 THOUSANDS/ΜL (ref 0.6–4.47)
LYMPHOCYTES NFR BLD AUTO: 29 % (ref 14–44)
MCH RBC QN AUTO: 32.6 PG (ref 26.8–34.3)
MCHC RBC AUTO-ENTMCNC: 33.2 G/DL (ref 31.4–37.4)
MCV RBC AUTO: 98 FL (ref 82–98)
MONOCYTES # BLD AUTO: 0.44 THOUSAND/ΜL (ref 0.17–1.22)
MONOCYTES NFR BLD AUTO: 8 % (ref 4–12)
NEUTROPHILS # BLD AUTO: 3.66 THOUSANDS/ΜL (ref 1.85–7.62)
NEUTS SEG NFR BLD AUTO: 63 % (ref 43–75)
NRBC BLD AUTO-RTO: 0 /100 WBCS
PLATELET # BLD AUTO: 177 THOUSANDS/UL (ref 149–390)
PMV BLD AUTO: 9.1 FL (ref 8.9–12.7)
RBC # BLD AUTO: 4.48 MILLION/UL (ref 3.81–5.12)
VALPROATE SERPL-MCNC: 63 UG/ML (ref 50–100)
WBC # BLD AUTO: 5.85 THOUSAND/UL (ref 4.31–10.16)

## 2019-08-22 PROCEDURE — 80164 ASSAY DIPROPYLACETIC ACD TOT: CPT | Performed by: STUDENT IN AN ORGANIZED HEALTH CARE EDUCATION/TRAINING PROGRAM

## 2019-08-22 PROCEDURE — 99232 SBSQ HOSP IP/OBS MODERATE 35: CPT | Performed by: PSYCHIATRY & NEUROLOGY

## 2019-08-22 PROCEDURE — 85025 COMPLETE CBC W/AUTO DIFF WBC: CPT | Performed by: STUDENT IN AN ORGANIZED HEALTH CARE EDUCATION/TRAINING PROGRAM

## 2019-08-22 RX ORDER — BENZTROPINE MESYLATE 1 MG/1
1 TABLET ORAL 2 TIMES DAILY
Status: DISCONTINUED | OUTPATIENT
Start: 2019-08-22 | End: 2019-09-04 | Stop reason: HOSPADM

## 2019-08-22 RX ADMIN — RISPERIDONE 2 MG: 2 TABLET ORAL at 21:55

## 2019-08-22 RX ADMIN — LORATADINE 10 MG: 10 TABLET ORAL at 10:01

## 2019-08-22 RX ADMIN — DIVALPROEX SODIUM 750 MG: 500 TABLET, FILM COATED, EXTENDED RELEASE ORAL at 21:55

## 2019-08-22 RX ADMIN — BENZTROPINE MESYLATE 1 MG: 1 TABLET ORAL at 10:02

## 2019-08-22 RX ADMIN — FAMOTIDINE 20 MG: 20 TABLET ORAL at 10:01

## 2019-08-22 RX ADMIN — BENZTROPINE MESYLATE 1 MG: 1 TABLET ORAL at 18:20

## 2019-08-22 RX ADMIN — FAMOTIDINE 20 MG: 20 TABLET ORAL at 18:20
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
## 2019-08-22 NOTE — PROGRESS NOTES
Pt observed sitting in bed awake throughout day  Pt did not attend morning groups or breakfast/lunch  Pt said that she has been drinking her ensure supplements  Pt denies all symptoms  Calm and appropriate  Pt said she is tired today and this is why she has been staying in room

## 2019-08-22 NOTE — CASE MANAGEMENT
AQUILES returned a phone call to [address] of [place] @ [phone] & left a message seeking a returned call  CM received the application for [facility] & was able to complete it & sent it to [email or web address] as instructed on the application  CM met with Pt who reported feeling better  CM reviewed that she was able to submit the application for [facility] & Pt is hopeful for placement in a facility

## 2019-08-22 NOTE — PLAN OF CARE
Pt hopeful for placement  Referral made to [facility] & [facility]DS are also working on placement options

## 2019-08-22 NOTE — CASE MANAGEMENT
CM receivded a residential services referral form from Val Vargas, Pt's TCM through [facility] MH/DS  CM met with Pt to review the application & she signed it; it was sent back to Val Saleh to submit  CM & Pt talked about looking for residential placement & Pt is in agreement  CM returned a phone call to Stas Baum of [place] [address] @ [phone] & requested a return call

## 2019-08-22 NOTE — CASE MANAGEMENT
AQUILES received a phone call from [id number] [facility] MH/DS, seeking an update  CM reviewed that Pt is doing well on the unit & there have been no issues  CM reviewed that she received a referral form for Juan Francisco Gonzalez [address] [place] [address], however, due to time constraints, AQUILES is unsure if it will be completed today  CM said that she may also need assistance from Pt's TCM, as she has worked with her for years, & CM only has worked with Pt for a few days  Lester Humphrey said that she would be off tomorrow & CM should contact Jessika Lopes directly  Maria Eugenia reported they were working on a housing option for Pt as well & there was a 50/50 chance she would be accepted  Maria Eugenia said that they also had spoken with Pt's mom recently & she is not ready to make any decisions regarding options like an apartment for Pt with staffing

## 2019-08-22 NOTE — PROGRESS NOTES
Pt pleasant on approach  Reports feeling better this evening and ate dinner in dining room  Encouraged pt to shower soon  Declined at this time but said she will try to in the morning  Pt did not attend groups throughout day, said she was not feeling well but was not specific regarding symptoms  Pt's only complaint during the day was some drowsiness  Pt denies all symptoms and denies any concerns at this time

## 2019-08-22 NOTE — PROGRESS NOTES
Progress Note - [address] [employer] Jenny 30 y o  female MRN: [id number]  Unit/Bed#: [facility] 255-02 Encounter: [id number]    Assessment/Plan   Principal Problem:    Bipolar 1 disorder, depressed, severe ([facility] )  Active Problems:    RADHA (generalized anxiety disorder)    TBI (traumatic brain injury) ([facility] )    Autism spectrum    Tourette's      Subjective:  Patient guarded and seclusive to her room  Reported she had poor sleep last night secondary to restlessness  States she has tremors in her hands, feels internally she is trembling, jaw stiffness, and teeth grinding  Will add Cogentin  Depakote level today was 63  States today she woke up angry  Could not give me a reason why she is angry  States she is not angry anyone  Appeared more constricted and flat  Reports depression symptom of sadness and reduction of passive death wishes  Contracts for safety  Appeared mildly anxious in regards to her disposition planning  Denied psychosis  Does not endorse signs of darien  Denied delusional material   Medication compliant  Denied additional somatic complaints      Current Medications:  Current Facility-Administered Medications   Medication Dose Route Frequency    acetaminophen (TYLENOL) tablet 325 mg  325 mg Oral Q6H PRN    acetaminophen (TYLENOL) tablet 975 mg  975 mg Oral Q6H PRN    benztropine (COGENTIN) injection 1 mg  1 mg Intramuscular Q6H PRN    benztropine (COGENTIN) tablet 1 mg  1 mg Oral Q6H PRN    benztropine (COGENTIN) tablet 1 mg  1 mg Oral BID    divalproex sodium (DEPAKOTE ER) 24 hr tablet 750 mg  750 mg Oral HS    famotidine (PEPCID) tablet 20 mg  20 mg Oral BID    ibuprofen (MOTRIN) tablet 600 mg  600 mg Oral Q8H PRN    loratadine (CLARITIN) tablet 10 mg  10 mg Oral Daily    LORazepam (ATIVAN) 2 mg/mL injection 2 mg  2 mg Intramuscular Q6H PRN    LORazepam (ATIVAN) tablet 1 mg  1 mg Oral Q6H PRN    magnesium hydroxide (MILK OF MAGNESIA) 400 mg/5 mL oral suspension 30 mL  30 mL Oral Daily PRN    OLANZapine (ZyPREXA) IM injection 10 mg  10 mg Intramuscular Q8H PRN    OLANZapine (ZyPREXA) tablet 10 mg  10 mg Oral Q8H PRN    ondansetron (ZOFRAN-ODT) dispersible tablet 4 mg  4 mg Oral Q6H PRN    risperiDONE (RisperDAL M-TABS) dispersible tablet 1 mg  1 mg Oral Q3H PRN    risperiDONE (RisperDAL) tablet 2 mg  2 mg Oral HS    traZODone (DESYREL) tablet 50 mg  50 mg Oral HS PRN       Behavioral Health Medications: all current active meds have been reviewed and continue current psychiatric medications  Vitals:  Vitals:    08/22/19 0737   BP: 122/55   Pulse: 101   Resp: 16   Temp: (!) 96 1 °F (35 6 °C)   SpO2:        Laboratory results:    I have personally reviewed all pertinent laboratory/tests results    Most Recent Labs:   Lab Results   Component Value Date    WBC 5 85 08/22/2019    RBC 4 48 08/22/2019    HGB 14 6 08/22/2019    HCT 44 0 08/22/2019     08/22/2019    RDW 11 9 08/22/2019    NEUTROABS 3 66 08/22/2019    SODIUM 139 08/19/2019    K 3 7 08/19/2019     08/19/2019    CO2 26 08/19/2019    BUN 12 08/19/2019    CREATININE 0 69 08/19/2019    GLUCOSE 87 09/23/2015    GLUC 102 08/19/2019    GLUF 102 (H) 08/19/2019    CALCIUM 9 7 08/19/2019    AST 39 08/19/2019    ALT 58 08/19/2019    ALKPHOS 74 08/19/2019    TP 7 2 08/19/2019    ALB 3 9 08/19/2019    TBILI 0 40 08/19/2019    CHOLESTEROL 180 07/16/2019    HDL 59 07/16/2019    TRIG 176 (H) 07/16/2019    LDLCALC 86 07/16/2019    NONHDLC 121 07/16/2019    VALPROICTOT 63 08/22/2019    AMMONIA <10 (L) 08/16/2019    [id number] 2 755 08/19/2019    FREET4 0 68 (L) 06/12/2019    T3FREE 2 44 06/12/2019    PREGSERUM Negative 08/19/2019    RPR Non-Reactive 06/13/2019    HGBA1C 5 3 06/13/2019     06/13/2019       Psychiatric Review of Systems:  Behavior over the last 24 hours:  unchanged  Sleep: poor  Appetite: normal  Medication side effects: Yes, shaky, internal trembling, jaw stiffness, grinding of teeth  ROS: no complaints    Mental Status Evaluation:  Appearance:  casually dressed   Behavior:  guarded and seclusive   Speech:  soft   Mood:  angry   Affect:  constricted, flat and mood-incongruent   Language sparse   Thought Process:  concrete   Thought Content:  obsessions   Perceptual Disturbances: None   Risk Potential: Reduced passive death wishes  Denied HI  Potential for aggression: No   Sensorium:  person and place   Cognition:  grossly intact   Consciousness:  alert and awake    Recent and Remote Memory limited   Attention: attention span appeared shorter than expected for age   Insight:  limited   Judgment: limited   Gait/Station: normal gait/station and normal balance   Motor Activity: no abnormal movements     Progress Toward Goals: unchanged    Recommended Treatment: Continue with group therapy, milieu therapy and occupational therapy  1   Add Cogentin 1mg BID for reported EPS  2  Continue other medications  3  Disposition planning     Risks, benefits and possible side effects of Medications:   Risks, benefits, and possible side effects of medications explained to patient and patient verbalizes understanding        Ankita Shaikh PA-C

## 2019-08-23 PROCEDURE — 99232 SBSQ HOSP IP/OBS MODERATE 35: CPT | Performed by: PSYCHIATRY & NEUROLOGY

## 2019-08-23 RX ADMIN — LORATADINE 10 MG: 10 TABLET ORAL at 09:06

## 2019-08-23 RX ADMIN — BENZTROPINE MESYLATE 1 MG: 1 TABLET ORAL at 09:06

## 2019-08-23 RX ADMIN — DIVALPROEX SODIUM 750 MG: 500 TABLET, FILM COATED, EXTENDED RELEASE ORAL at 22:13

## 2019-08-23 RX ADMIN — FAMOTIDINE 20 MG: 20 TABLET ORAL at 17:52

## 2019-08-23 RX ADMIN — LORAZEPAM 1 MG: 1 TABLET ORAL at 13:55

## 2019-08-23 RX ADMIN — RISPERIDONE 2 MG: 2 TABLET ORAL at 22:14

## 2019-08-23 RX ADMIN — FAMOTIDINE 20 MG: 20 TABLET ORAL at 09:06

## 2019-08-23 RX ADMIN — BENZTROPINE MESYLATE 1 MG: 1 TABLET ORAL at 17:52

## 2019-08-23 NOTE — PROGRESS NOTES
Status: Pt not wanting to eat meals but is drinking ensure  Pt did eat her dinner  She is seculsive to her room, but pleasant & cooperative  Medication: Cogentin added / no prns  D/C: TBD - referral made to [facility] & [facility] MH/DS is working on placement as well      08/23/19 3490   Team Meeting   Meeting Type Daily Rounds   Team Members Present   Team Members Present Physician;Nurse;;Occupational Therapist   Physician Team Member Dr Tomasa Robins / Dejan Merchant Team Member Bisi Arellano / Oma Peña Management Team Member Thompson / [address] [place] [address]   OT Team Member Gordon   Patient/Family Present   Patient Present No   Patient's Family Present No

## 2019-08-23 NOTE — CASE MANAGEMENT
CM & Pt contacted [facility] via conference call @ [phone] opt [phone]#  On the phone was Kandice Hernandez, Clinical Director, Sheila Malhotra [facility] 76  Martínez explained their program & asked Pt how she did living with other people, & Pt reported she never lived anywhere else  They asked her about [facility] & Pt said that she didn't live there but went to groups there  Martínez asked if Pt had ever lived anywhere besides with her mom & dad & Pt said no  Martínez asked how Pt felt about living with other people in the hospital & Pt said that it was hard  Tabatha Morales spoke to Pt about coming to tour the facility to see if she liked it & she said she would prefer to do this & would like for her mom to bring her  CM reviewed there was a barrier of getting in contact with mom at this time  Pt was asked about her mom & if she was in the hospital, but Pt said no, she was probably just tired & at home & not answering the phone  Pt was pressed a little more about living away from mom & she became tearful & put her head down on the desk  Pt asking if the call could end & CM asked for a few minutes & muted the phone  Pt stating she didn't want to talk any more, & nursing came to assist Pt back to her room  CM continued the call & Theodora Pavon said that they have 2 TBI units, 8 people per unit, but it's inside [facility] which may have up to 80 people  Pt would have her own bedroom & own bathroom in her room, but shared shower  Tabatha Morales said that due to Pt becoming upset regarding leaving mom, they would definitely prefer she come & do a tour before she would be accepted  Theodora Pavon said that they could also come out to the hospital to meet with her in person  CM agreed to review all this with the treatment team & follow-up on Monday  CM met with Pt who had spoken with nursing & received prn medication  Pt was calm & agreed for CM to talk more to her about [facility] HEART AND L[facility]G [place]    Pt said that she would like to go tour the facility but would really like it if her mom could go with her  CM said that she needed to talk more with the treatment team about all of this & they would discuss it in more detail on Monday, & Pt agreeable  CM received a call from [employer], manager for [employer]  She said that her understanding was that Pt was kicked out of [facility], where she had lived for 2 years, due to behavior & not complying with treatment goals  Pt's mom took her home, just so that she wouldn't be homeless  Her waiver cover cognitive therapy services, however, there are no therapist in the county Pt lives in   CM asked about Pt's wavier for a TBI Unit like [facility]  [address] was able to confirm that Pt does have an independence waiver  She said that Pt was eligible for  services which would be able to help with bathing, dressing, meal prep, etc, however, Pt wanted them to be able to color with her which they are not able to do  Pt & her mother decided they did not want an aide  Liz said that Pt's supports coordinator, Veronika Vargas, is back on Tuesday & encouraged CM to outreach to her regarding the [facility] [employer]

## 2019-08-23 NOTE — PROGRESS NOTES
Pt left conference call and went to room crying  States the idea of going to a group home is "terrifying"  "I've always lived at home with my Mom"  Pt doesn't understand why she can't just go home  Acknowledges anger at home when her Mom asks her to take out the dog  Worries about her Mom getting older and what will happen to her when she dies  Overwhelmed with anxiety  Able to accept comfort and reassurance from nurse  Encouraged to look at positives in her life and see new experiences as opportunities for things to get better  Pt given positive feedback for appropriately expressing her feelings of sadness and anxiety without acting out  Emotions validated and pt had a good cry and felt better  Did take a prn ativan at that time at her request  Currently resting in bed

## 2019-08-23 NOTE — PROGRESS NOTES
Psychiatric Evaluation - [address] [employer] MAITE Alvarado 30 y o  female MRN: [id number]  Unit/Bed#: [facility] 255-02 Encounter: [id number]    Assessment/Plan   Principal Problem:    Bipolar 1 disorder, depressed, severe ([facility] [address] )  Active Problems:    RADHA (generalized anxiety disorder)    TBI (traumatic brain injury) ([facility] 75 )    Autism spectrum    Tourette's    Plan:   Continue Depakote, 750 mg HS, for mood stabilization; level therapeutic  Continue risperidone 2 mg HS for management of impulsivity  Continue Pepcid 20 mg b i d  For management of nausea  Continue cogentin 1mg bid for management of antipsychotic-related side effects    Treatment options and alternatives were reviewed with the patient, who concurs with the above plan  Risks, benefits, and possible side effects of medications were explained to the patient, and she verbalizes understanding       -----------------------------------    Subjective: [place] has been compliant with medications  she reports no nausea so far today  She states she was able to eat cereal and rosario at breakfast  She reports frustration regarding the orange juice that has been on her meal tray for the last few days  Patient stated that orange juice "or orange drink" should be on her allergy list because it causes "rages," which she describes as "major meltdowns" in which she will put herself on the floor and bang her fists  She states she first discovered this reaction in the first grade and that her parents have attempted to give her orange juice another couple of times in the past to test their theory that her behavior is due to the juice, at which times she has had additional such behaviors  Patient is consenting for safety on the unit  Psychiatric Review of Systems:  Behavior over the last 24 hours: improved  Sleep: improved  Appetite: Improved  Medication side effects: none  ROS: Complete review of systems is negative except as noted above        Meds/Allergies   all current active meds have been reviewed and current meds:   Current Facility-Administered Medications   Medication Dose Route Frequency    acetaminophen (TYLENOL) tablet 325 mg  325 mg Oral Q6H PRN    acetaminophen (TYLENOL) tablet 975 mg  975 mg Oral Q6H PRN    benztropine (COGENTIN) injection 1 mg  1 mg Intramuscular Q6H PRN    benztropine (COGENTIN) tablet 1 mg  1 mg Oral Q6H PRN    benztropine (COGENTIN) tablet 1 mg  1 mg Oral BID    divalproex sodium (DEPAKOTE ER) 24 hr tablet 750 mg  750 mg Oral HS    famotidine (PEPCID) tablet 20 mg  20 mg Oral BID    ibuprofen (MOTRIN) tablet 600 mg  600 mg Oral Q8H PRN    loratadine (CLARITIN) tablet 10 mg  10 mg Oral Daily    LORazepam (ATIVAN) 2 mg/mL injection 2 mg  2 mg Intramuscular Q6H PRN    LORazepam (ATIVAN) tablet 1 mg  1 mg Oral Q6H PRN    magnesium hydroxide (MILK OF MAGNESIA) 400 mg/5 mL oral suspension 30 mL  30 mL Oral Daily PRN    OLANZapine (ZyPREXA) IM injection 10 mg  10 mg Intramuscular Q8H PRN    OLANZapine (ZyPREXA) tablet 10 mg  10 mg Oral Q8H PRN    ondansetron (ZOFRAN-ODT) dispersible tablet 4 mg  4 mg Oral Q6H PRN    risperiDONE (RisperDAL M-TABS) dispersible tablet 1 mg  1 mg Oral Q3H PRN    risperiDONE (RisperDAL) tablet 2 mg  2 mg Oral HS    traZODone (DESYREL) tablet 50 mg  50 mg Oral HS PRN     Allergies   Allergen Reactions    Eggs Or Egg-Derived Products     Fdc Yellow [Yellow Dye]        Objective    Vital signs in last 24 hours:  Temp:  [96 9 °F (36 1 °C)-98 1 °F (36 7 °C)] 96 9 °F (36 1 °C)  HR:  [95-98] 98  Resp:  [14-17] 14  BP: (114-137)/(60-75) 114/75    No intake or output data in the 24 hours ending 08/23/19 1034    Mental Status Evaluation:  Appearance:  alert, casually dressed, appears stated age, appropriate grooming and hygiene, overweight and short curly blonde hair,    Behavior:  cooperative, pleasant, sitting comfortably in chair, fair eye contact, no abnormal movements and normal gait and balance   Speech:  spontaneous, clear, normal rate, soft and coherent   Mood:  anxious and irritable   Affect:  mood-congruent, constricted, anxious and irritable   Thought Process:  coherent, perseverative, normal rate of thoughts   Thought Content: no overt paranoia, obsessive thoughts, ruminating thoughts, somatic preoccupation   Perceptual disturbances: no auditory hallucinations and no visual hallucinations   Risk Potential: Passive death wishes, No active homicidal ideation, Low potential for aggression   Cognition: oriented to person, place, time, and situation, appears to be below average intelligence, impaired abstract reasoning and attention span appeared shorter than expected for age   Insight:  Limited   Judgment: [address]       Laboratory results:   I have personally reviewed all pertinent laboratory/tests results    Most Recent Labs:   Lab Results   Component Value Date    WBC 5 85 08/22/2019    RBC 4 48 08/22/2019    HGB 14 6 08/22/2019    HCT 44 0 08/22/2019     08/22/2019    RDW 11 9 08/22/2019    NEUTROABS 3 66 08/22/2019    SODIUM 139 08/19/2019    K 3 7 08/19/2019     08/19/2019    CO2 26 08/19/2019    BUN 12 08/19/2019    CREATININE 0 69 08/19/2019    GLUCOSE 87 09/23/2015    GLUC 102 08/19/2019    GLUF 102 (H) 08/19/2019    CALCIUM 9 7 08/19/2019    AST 39 08/19/2019    ALT 58 08/19/2019    ALKPHOS 74 08/19/2019    TP 7 2 08/19/2019    ALB 3 9 08/19/2019    TBILI 0 40 08/19/2019    CHOLESTEROL 180 07/16/2019    HDL 59 07/16/2019    TRIG 176 (H) 07/16/2019    LDLCALC 86 07/16/2019    NONHDLC 121 07/16/2019    VALPROICTOT 63 08/22/2019    AMMONIA <10 (L) 08/16/2019    [id number] 2 755 08/19/2019    FREET4 0 68 (L) 06/12/2019    T3FREE 2 44 06/12/2019    PREGSERUM Negative 08/19/2019    RPR Non-Reactive 06/13/2019    HGBA1C 5 3 06/13/2019     06/13/2019       Imaging Studies:   No orders to display

## 2019-08-23 NOTE — PROGRESS NOTES
Pt mostly seclusive throughout morning  Out of bed for breakfast  Declined lunch  Attended one AM group  Pt calm and pleasant  Denies any concerns today

## 2019-08-24 PROCEDURE — 99232 SBSQ HOSP IP/OBS MODERATE 35: CPT | Performed by: STUDENT IN AN ORGANIZED HEALTH CARE EDUCATION/TRAINING PROGRAM

## 2019-08-24 RX ADMIN — LORAZEPAM 1 MG: 1 TABLET ORAL at 22:18

## 2019-08-24 RX ADMIN — TRAZODONE HYDROCHLORIDE 50 MG: 50 TABLET ORAL at 23:47

## 2019-08-24 RX ADMIN — DIVALPROEX SODIUM 750 MG: 500 TABLET, FILM COATED, EXTENDED RELEASE ORAL at 21:07

## 2019-08-24 RX ADMIN — FAMOTIDINE 20 MG: 20 TABLET ORAL at 17:01

## 2019-08-24 RX ADMIN — BENZTROPINE MESYLATE 1 MG: 1 TABLET ORAL at 09:31

## 2019-08-24 RX ADMIN — LORATADINE 10 MG: 10 TABLET ORAL at 09:31

## 2019-08-24 RX ADMIN — BENZTROPINE MESYLATE 1 MG: 1 TABLET ORAL at 17:01

## 2019-08-24 RX ADMIN — RISPERIDONE 2 MG: 2 TABLET ORAL at 21:08

## 2019-08-24 RX ADMIN — FAMOTIDINE 20 MG: 20 TABLET ORAL at 09:31

## 2019-08-24 NOTE — PLAN OF CARE
Problem: Ineffective Coping  Goal: Identifies ineffective coping skills  Outcome: Progressing  Goal: Identifies healthy coping skills  Outcome: Progressing  Goal: Demonstrates healthy coping skills  Outcome: Progressing  Goal: Participates in unit activities  Description  Interventions:  - Provide therapeutic environment   - Provide required programming   - Redirect inappropriate behaviors   Outcome: Progressing  Goal: Patient/Family participate in treatment and DC plans  Description  Interventions:  - Provide therapeutic environment  Outcome: Progressing  Goal: Patient/Family verbalizes awareness of resources  Outcome: Progressing     Problem: Depression  Goal: Treatment Goal: Demonstrate behavioral control of depressive symptoms, verbalize feelings of improved mood/affect, and adopt new coping skills prior to discharge  Outcome: Progressing  Goal: Verbalize thoughts and feelings  Description  Interventions:  - Assess and re-assess patient's level of risk   - Engage patient in 1:1 interactions, daily, for a minimum of 15 minutes   - Encourage patient to express feelings, fears, frustrations, hopes   Outcome: Progressing  Goal: Refrain from harming self  Description  Interventions:  - Monitor patient closely, per order   - Supervise medication ingestion, monitor effects and side effects   Outcome: Progressing  Goal: Refrain from isolation  Description  Interventions:  - Develop a trusting relationship   - Encourage socialization   Outcome: Progressing  Goal: Refrain from self-neglect  Outcome: Progressing  Goal: Attend and participate in unit activities, including therapeutic, recreational, and educational groups  Description  Interventions:  - Provide therapeutic and educational activities daily, encourage attendance and participation, and document same in the medical record   Outcome: Progressing  Goal: Complete daily ADLs, including personal hygiene independently, as able  Description  Interventions:  - Observe, teach, and assist patient with ADLS  -  Monitor and promote a balance of rest/activity, with adequate nutrition and elimination   Outcome: Progressing     Problem: Risk for Violence/Aggression Toward Others  Goal: Treatment Goal: Refrain from acts of violence/aggression during length of stay, and demonstrate improved impulse control at the time of discharge  Outcome: Progressing  Goal: Refrain from harming others  Outcome: Progressing     Problem: DISCHARGE PLANNING  Goal: Discharge to home or other facility with appropriate resources  Description  INTERVENTIONS:  - Identify barriers to discharge w/patient and caregiver  - Arrange for needed discharge resources and transportation as appropriate  - Identify discharge learning needs (meds, wound care, etc )  - Arrange for interpretive services to assist at discharge as needed  - Refer to Case Management Department for coordinating discharge planning if the patient needs post-hospital services based on physician/advanced practitioner order or complex needs related to functional status, cognitive ability, or social support system  Outcome: Progressing     Problem: Nutrition/Hydration-ADULT  Goal: Nutrient/Hydration intake appropriate for improving, restoring or maintaining nutritional needs  Description  Monitor and assess patient's nutrition/hydration status for malnutrition  Collaborate with interdisciplinary team and initiate plan and interventions as ordered  Monitor patient's weight and dietary intake as ordered or per policy  Utilize nutrition screening tool and intervene as necessary  Determine patient's food preferences and provide high-protein, high-caloric foods as appropriate       INTERVENTIONS:  - Monitor oral intake, urinary output, labs, and treatment plans  - Assess nutrition and hydration status and recommend course of action  - Evaluate amount of meals eaten  - Assist patient with eating if necessary   - Allow adequate time for meals  - Recommend/ encourage appropriate diets, oral nutritional supplements, and vitamin/mineral supplements  - Order, calculate, and assess calorie counts as needed  - Recommend, monitor, and adjust tube feedings and TPN/PPN based on assessed needs  - Assess need for intravenous fluids  - Provide specific nutrition/hydration education as appropriate  - Include patient/family/caregiver in decisions related to nutrition  Outcome: Progressing

## 2019-08-24 NOTE — PROGRESS NOTES
Progress Note - [id number] [address] MENA PRESTIGE V Jenny 30 y o  female MRN: [id number]  Unit/Bed#: Suzanna Harley 255-02 Encounter: [id number]    Subjective:    Per nursing, patient is pleasant and cooperative during interaction, attending groups, seclusive to her room, appeared a bit depressed this afternoon       Per patient, patient reports things have been going better  She reports that she has been in hospital for awhile  Patient reports her mood has been "pretty decent," a little depressed, denying anger or irritability  Patient reports that she is sleeping okay, appetite has been good  Patient denies any passive or active suicidal or homicidal ideation, intent, or plan  Patient denies any auditory or visual hallucinations, denies feelings of paranoia  Behavior over the last 24 hours:  improved  Medication side effects: No  ROS: Dizziness    Objective:    Temp:  [97 2 °F (36 2 °C)-97 9 °F (36 6 °C)] 97 9 °F (36 6 °C)  HR:  [104-109] 109  Resp:  [16] 16  BP: (108-115)/(69-74) 115/74    Mental Status Evaluation:  Appearance:  sitting comfortably in chair, dressed in casual clothing, cooperative with interview, fair eye contact, unkempt   Behavior:  No tics, tremors, or behaviors observed   Speech: Fast rate, rhythm, and volume   Mood:  "Pretty decent, a little depressed"   Affect:  Appears mildly constricted in depressed range, stable, mood-congruent   Thought Process:  Perseverative   Associations perseverative   Thought Content:  No passive or active suicidal or homicidal ideation, intent, or plan  Perceptual Disturbances: Denies any auditory or visual hallucinations   Sensorium:  Oriented to person, place, time, and situation   Memory:  recent and remote memory grossly intact   Consciousness:  alert and awake   Attention: attention span and concentration were age appropriate   Insight:  Limited   Judgment: fair   Gait/Station: normal gait/station   Motor Activity: no abnormal movements       Labs:    I have personally reviewed all pertinent laboratory/tests results  Labs in last 72 hours:   Recent Labs     08/22/19  0937 08/22/19  0938   WBC 5 85  --    RBC 4 48  --    HGB 14 6  --    HCT 44 0  --      --    RDW 11 9  --    NEUTROABS 3 66  --    VALPROICTOT  --  63       Progress Toward Goals: Progressing    Recommended Treatment: Continue with group therapy, milieu therapy and occupational therapy  Risks, benefits and possible side effects of Medications:   Risks, benefits, and possible side effects of medications explained to patient and patient verbalizes understanding  Medications: all current active meds have been reviewed      Current Facility-Administered Medications:  acetaminophen 325 mg Oral Q6H PRN Johnny Magyar, PA-C   acetaminophen 975 mg Oral Q6H PRN Johnny Magyar, PA-C   benztropine 1 mg Intramuscular Q6H PRN Johnny Magyar, PA-C   benztropine 1 mg Oral Q6H PRN Johnny Magyar, PA-C   benztropine 1 mg Oral BID Johnny Magyar, PA-C   divalproex sodium 750 mg Oral HS Rich Soto MD   famotidine 20 mg Oral BID Kathaleen Katy III, DO   ibuprofen 600 mg Oral Q8H PRN Johnny Magyar, PA-C   loratadine 10 mg Oral Daily Johnny Magyar, PA-C   LORazepam 2 mg Intramuscular Q6H PRN Johnny Magyar, PA-C   LORazepam 1 mg Oral Q6H PRN Johnny Magyar, PA-C   magnesium hydroxide 30 mL Oral Daily PRN Johnny Magyar, PA-C   OLANZapine 10 mg Intramuscular Q8H PRN Johnny Magyar, PA-C   OLANZapine 10 mg Oral Q8H PRN Johnny Magyar, PA-C   ondansetron 4 mg Oral Q6H PRN Yoni Grant   risperiDONE 1 mg Oral Q3H PRN Johnny Magyar, PA-C   risperiDONE 2 mg Oral HS Mer Pate MD   traZODone 50 mg Oral HS PRN Johnny Magyar, PA-C           Assessment/Plan   Principal Problem:    Bipolar 1 disorder, depressed, severe (HCC)  Active Problems:    RADHA (generalized anxiety disorder)    TBI (traumatic brain injury) ([facility] [address] )    Autism spectrum    Tourette's    29 y/o Female with bipolar I d/o, ASD, TBI- some improvement in mood symptoms, mild depressive symptoms, in fair behavioral control, no current SI    Plan:  -Continue current med regimen   -Continue dispo planning

## 2019-08-24 NOTE — PROGRESS NOTES
Pt woke overnight believing it was the morning  Was told the time and pt then returned to room  Had difficulty returning back to sleep and appeared anxious however pt denies any concerns  Remains pleasant  Able to sleep overall about 4-5 hours

## 2019-08-24 NOTE — PROGRESS NOTES
Pt out in dayroom  Social with peers  She was watching tv and talking with male peer  Pt more comfortable in milieu  Denied any concerns, waiting for placement    Denied SI

## 2019-08-24 NOTE — PROGRESS NOTES
Daily rounds    Denies all sx except anxiety  PRN Ativan yesterday after finding out she cannot return home  Looking for housing placement  Cogentin effective fore tremors

## 2019-08-24 NOTE — PROGRESS NOTES
Pt is pleasant and cooperative during interaction  Attends groups  Pt seclusive, lying in bed this afternoon  Pt appeared depressed but declined to speak to writer   Denies SI

## 2019-08-25 PROCEDURE — 99232 SBSQ HOSP IP/OBS MODERATE 35: CPT | Performed by: STUDENT IN AN ORGANIZED HEALTH CARE EDUCATION/TRAINING PROGRAM

## 2019-08-25 RX ADMIN — RISPERIDONE 3 MG: 2 TABLET ORAL at 22:25

## 2019-08-25 RX ADMIN — DIVALPROEX SODIUM 750 MG: 500 TABLET, FILM COATED, EXTENDED RELEASE ORAL at 22:24

## 2019-08-25 RX ADMIN — FAMOTIDINE 20 MG: 20 TABLET ORAL at 09:37

## 2019-08-25 RX ADMIN — RISPERIDONE 1 MG: 1 TABLET, ORALLY DISINTEGRATING ORAL at 11:30

## 2019-08-25 RX ADMIN — LORAZEPAM 1 MG: 1 TABLET ORAL at 11:30

## 2019-08-25 RX ADMIN — BENZTROPINE MESYLATE 1 MG: 1 TABLET ORAL at 17:18

## 2019-08-25 RX ADMIN — BENZTROPINE MESYLATE 1 MG: 1 TABLET ORAL at 09:37

## 2019-08-25 RX ADMIN — FAMOTIDINE 20 MG: 20 TABLET ORAL at 17:18

## 2019-08-25 RX ADMIN — LORATADINE 10 MG: 10 TABLET ORAL at 09:37

## 2019-08-25 NOTE — PROGRESS NOTES
Pt reports being upset during morning group and left group due to peer  Says she is feeling better following PRN Risperdal and Ativan  She expresses fatigue due to poor sleep last night and that she is having difficulty keeping her eyes open  Informed her that the PRN meds can add to the fatigue  Encouraged patient to rest for a bit but not too long so it doesn't interfere with her sleep tonight  Pt agreeable  Denies SI/AVH

## 2019-08-25 NOTE — PROGRESS NOTES
Pt   Given  Ativan  1 mg  Po  For  Severe  Anxiety  At 2218  Scale [address]  Med effective  Pt  Kamari Sifuentes

## 2019-08-25 NOTE — PROGRESS NOTES
Pt woke up at 2340 after receiving PRN dose of Ativan 1mg at 2218  Pt has been heard talking in sleep, talking to self  Pt was given PRN Trazodone 50mg po at 2347 for sleep  Medication ineffective  Pt has been unable to return to sleep  Awake in room, folding/organizing clothing, tangential, difficulty finishing thought before the next  Pt is oriented to time, place, person, no slurred speech  Pt has sound machine in room, has been provided with water, offered/declined snack  Pt's roommate is now awake and yelling came from roommate  Staff responded and pt was in hallway, escorted to [address] [place] [address] to talk  Pt reports offering roommate leftover snack and that roommate began rambling, Pt looked over at roommate and turned away, sitting on edge of bed looking toward window  States roommate began rambling loudly and then yelling  Pt was able to be redirected in Quiet Room, and will try to sleep there this evening  Will continue to monitor and support throughout the night

## 2019-08-25 NOTE — PROGRESS NOTES
Pt was in and out of Quiet Room, pt thinking it is 'time for the day room to be open' or 'time for breakfast'  Pt was reoriented to place and time frequently throughout the night  No irritability with redirect  Pt had to be shown back to Quiet Room and encouraged to rest  Pt had been coloring at times  Currently laying down heard talking loudly to self

## 2019-08-25 NOTE — PROGRESS NOTES
Pt yelled out from room, MHT and this writer responded to pt's room  Pt is talking loudly, appears to be asleep  Did respond to name when writer called to pt  Pt making statements about someone making pt put shoes on, not knowing where going to  Says they told pt the word "skeleton" which frightened pt  Pt denied AH prior to this episode  Gave pt stuffed bunny to hug, rubbing pt's back and talking to help soothe pt  Pt in agreement to take PRN Ativan, recently received scheduled Risperdal  Pt appears to be tired, eyes fluttering during conversation, afraid to fall back to sleep  Pt showing some improvement with talking with staff members  Will continue to monitor and support

## 2019-08-25 NOTE — PROGRESS NOTES
Progress Note - [id number] [employer] V Jenny 30 y o  female MRN: [id number]  Unit/Bed#: Sigifredo Mcdonnell 255-02 Encounter: [id number]    Subjective:    Per nursing, patient is upset during morning group, trouble sleeping at night, confused about time of day, period of agitation this morning requiring prn medication, poor sleep throughout the night  Per patient, patient reports not feeling well  She reports her mood is "not too bad" but is confused about day and night  She reports feeling badly about getting agitated but reports finding it hard to get the words out at times  Denies any passive or active SI, itent, or plan  Denies feeling anxious  Denies any physical symptoms  Behavior over the last 24 hours:  unchanged  Medication side effects: No  ROS: no complaints    Objective:    Temp:  [95 8 °F (35 4 °C)-97 9 °F (36 6 °C)] 95 8 °F (35 4 °C)  HR:  [] 95  Resp:  [16] 16  BP: (111-115)/(63-74) 111/69    Mental Status Evaluation:  Appearance:  Appears anxious, a bit unkempt, comfortable, cooperative with interview   Behavior:  restless and fidgety   Speech:  Normal rate, rhythm, and volume   Mood:  "not too bad"   Affect:  Appears dysphoric, anxious   Thought Process: Tangential   Associations tangential associations   Thought Content:  No passive or active suicidal or homicidal ideation, intent, or plan  Perceptual Disturbances: Denies any auditory or visual hallucinations   Sensorium:  Oriented to person, place, disoriented to time   Memory:  recent and remote memory grossly intact   Consciousness:  alert and awake   Attention: attention span and concentration were age appropriate   Insight:  Limited   Judgment: fair   Gait/Station: normal gait/station   Motor Activity: no abnormal movements       Progress Toward Goals: Minimal progress    Recommended Treatment: Continue with group therapy, milieu therapy and occupational therapy        Risks, benefits and possible side effects of Medications:   Risks, benefits, and possible side effects of medications explained to patient and patient verbalizes understanding  Medications: all current active meds have been reviewed  Current Facility-Administered Medications:  acetaminophen 325 mg Oral Q6H PRN Mary Huff, PA-IVÁN   acetaminophen 975 mg Oral Q6H PRN Mary Huff, PA-C   benztropine 1 mg Intramuscular Q6H PRN Mary Huff, PA-C   benztropine 1 mg Oral Q6H PRN Mary Huff, PA-C   benztropine 1 mg Oral BID Mary Huff, PA-C   divalproex sodium 750 mg Oral HS Rj Reina MD   famotidine 20 mg Oral BID Berry Sachs III, DO   ibuprofen 600 mg Oral Q8H PRN Mary Huff, PA-C   loratadine 10 mg Oral Daily Mary Huff, PA-C   LORazepam 2 mg Intramuscular Q6H PRN Mary Huff, PA-C   LORazepam 1 mg Oral Q6H PRN Mary Huff, PA-C   magnesium hydroxide 30 mL Oral Daily PRN Mary Huff, PA-C   OLANZapine 10 mg Intramuscular Q8H PRN Mary Huff, PA-C   OLANZapine 10 mg Oral Q8H PRN Mary Huff, PA-C   ondansetron 4 mg Oral Q6H PRN Yoni Grant   risperiDONE 1 mg Oral Q3H PRN Mary Huff, PA-C   risperiDONE 2 mg Oral HS Nitesh Haile MD   traZODone 50 mg Oral HS PRN Mary Huff, PA-C           Assessment/Plan   Principal Problem:    Bipolar 1 disorder, depressed, severe (HCC)  Active Problems:    RADHA (generalized anxiety disorder)    TBI (traumatic brain injury) ([facility] [address] )    Autism spectrum    Tourette's    31 y/o Female with bipolar I d/o, RADHA, ASD, Tourette's- continues to have some mood instability, some disorientation at times, getting agitated this morning during group  Plan:  -Will titrate Risperdal to 3 mg qhs    -Continue rest of med regimen

## 2019-08-25 NOTE — PROGRESS NOTES
Visible in dayroom, writing letter to mother throughout afternoon  Brighter affect  Socializing with peers  Attended group  Able to discuss past experiences and stressors appropriately

## 2019-08-25 NOTE — PROGRESS NOTES
Pt began yelling out from [address], writer responded  Pt yelling about "the tubes, They have these tubes all over me " Pt was agitated, yelling about hitting this writer, when asked orientation  Pt able to be calmed, but having a hard time verbally expressing self  No medication changes  PRN Ativan and Trazodone given at bedtime, ineffective for sleep, anxiety  Pt reports unsure if outbursts are Tourette's related or TBI  Pt allowed writer to touch arms to let pt know that there were no tubes connected to pt, this helped pt calm  Vitals are stable, afebrile  Continue to monitor

## 2019-08-25 NOTE — PROGRESS NOTES
Pt given PRN Risperdal and Ativan for increased anxiety and agitation after leaving group  Pt reports effectiveness  Appears comfortable and is coloring in room  Pleasant during interaction

## 2019-08-26 PROCEDURE — 99232 SBSQ HOSP IP/OBS MODERATE 35: CPT | Performed by: PSYCHIATRY & NEUROLOGY

## 2019-08-26 RX ADMIN — DIVALPROEX SODIUM 750 MG: 500 TABLET, FILM COATED, EXTENDED RELEASE ORAL at 21:03

## 2019-08-26 RX ADMIN — RISPERIDONE 3 MG: 2 TABLET ORAL at 21:03

## 2019-08-26 RX ADMIN — ACETAMINOPHEN 975 MG: 325 TABLET, FILM COATED ORAL at 01:55

## 2019-08-26 RX ADMIN — LORAZEPAM 1 MG: 1 TABLET ORAL at 00:01

## 2019-08-26 NOTE — PROGRESS NOTES
Status: Pt had difficulty over the weekend & was yelling & cursing  Pt had some confusion during the night  Pt reported other patients are making fun of her, but staff have been present in the day room & said that this is not happening  Medication: Risperdal increased to 3 mg / PRN Ativan, Risperdal, & Trazadone given over the weekend  D/C: TBD - [facility] would like to schedule a tour/visit for Pt    CM will work on coordinating this      08/26/19 7325   Team Meeting   Meeting Type Daily Rounds   Team Members Present   Team Members Present Physician;Nurse;;Occupational Therapist   Physician Team Member Dr Connie Clark / Reyes Spitz Team Member Paramjit Brooks / Edouard Fortune Management Team Member [id number] / Dash Cervantes   OT Team Member Gordon   Patient/Family Present   Patient Present No   Patient's Family Present No

## 2019-08-26 NOTE — PROGRESS NOTES
Progress Note - [address] [address] Jenny 30 y o  female MRN: [id number]  Unit/Bed#: -02 Encounter: [id number]    Assessment/Plan   Principal Problem:    Bipolar 1 disorder, depressed, severe ([facility] [address] )  Active Problems:    RADHA (generalized anxiety disorder)    TBI (traumatic brain injury) ([facility] )    Autism spectrum    Tourette's    31 y/o Female with bipolar I d/o, RADHA, ASD, Tourette's- continues to have some mood instability, some disorientation to time of day and agitation  No signs of EPS  Plan:  -Con't current medication regimen    Recommended Treatment:   Continue current medication regimen  Continue with group therapy, milieu therapy and occupational therapy  Risks, benefits and possible side effects of Medications: Risks, benefits, and possible side effects of medications have been explained to the patient, who verbalizes understanding  Progress Toward Goals: minimal improvement    ------------------------------------------------------------    Subjective: Enrique Perdue has been compliant with medications without acute side effects  She reports her mood as "ok & the same," with decreased energy and increased sleepiness  Even after lunch, the patient states she was so drowsy that she was unable to open her eyes to speak with me  She states her appetite is ok and denies any SI/HI intent or plan  The patient denies any muscle stiffness or further physical symptoms at this time  Patient is consenting for safety on the unit      Psychiatric Review of Systems:  Behavior over the last 24 hours: unchanged  Sleep: hypersomnia  Appetite: good, normal  Medication side effects: none  ROS: increased daytime sleepiness    Vital signs in last 24 hours:  Temp:  [96 °F (35 6 °C)-97 °F (36 1 °C)] 96 °F (35 6 °C)  HR:  [] 66  Resp:  [16-18] 16  BP: (103-117)/(58-65) 103/65    Mental Status Evaluation:  Appearance:  drowsy, appears stated age and disheveled   Behavior:  laying in bed, barely arousable   Speech: slurred and garbled   Mood:  "ok, the same"   Affect:  mood-congruent and irritable   Thought Process:  decreased rate of thoughts   Thought Content: no verbalized delusions, no overt paranoia, no obsessive thinking   Perceptual disturbances: no auditory hallucinations and no visual hallucinations   Risk Potential: No active or passive suicidal or homicidal ideation, Low potential for aggression   Cognition: attention span appeared shorter than expected for age   Insight:  Limited   Judgment: Limited       Current Medications:    Current Facility-Administered Medications:  acetaminophen 325 mg Oral Q6H PRN Trey Tsang, PA-C   acetaminophen 975 mg Oral Q6H PRN Trey Tsang, PA-C   benztropine 1 mg Intramuscular Q6H PRN Trey Tsang, PA-C   benztropine 1 mg Oral Q6H PRN Trey Tsang, PA-C   benztropine 1 mg Oral BID Trey Tsang, PA-C   divalproex sodium 750 mg Oral HS Cort Nyhan, MD   famotidine 20 mg Oral BID Juan Fairbanks III DO   ibuprofen 600 mg Oral Q8H PRN Trey Tsang, PA-C   loratadine 10 mg Oral Daily Trey Tsang, PA-C   LORazepam 2 mg Intramuscular Q6H PRN Trey Tsang, PA-C   LORazepam 1 mg Oral Q6H PRN Trey Tsang, PA-C   magnesium hydroxide 30 mL Oral Daily PRN Trey Tsang, PA-C   OLANZapine 10 mg Intramuscular Q8H PRN Trey Tsang, PA-C   OLANZapine 10 mg Oral Q8H PRN Trey Tsang, PA-C   ondansetron 4 mg Oral Q6H PRN Yoni Grant   risperiDONE 1 mg Oral Q3H PRN Trey Tsang, PA-C   risperiDONE 3 mg Oral HS Ben Duval MD   traZODone 50 mg Oral HS PRN Trey Tsang, PA-C       Behavioral Health Medications: all current active meds have been reviewed  Changes as above  Laboratory results:  I have personally reviewed all pertinent laboratory/tests results  No results found for this or any previous visit (from the past 48 hour(s))  This note has been constructed using a voice recognition system   There may be translation, syntax, or grammatical errors  If you have any questions, please contact the dictating provider

## 2019-08-26 NOTE — CASE MANAGEMENT
CM contacted Pt's TCM through [facility], Mckenna Anderson, @ [phone] to review how the phone interview went on Friday  CM asked if Mckenna Anderson or someone from the team would be able to accompany Pt & she said that she would have to talk to her supervisor & call CM back  CM emailed Michael Thomas of [place]  [facility] to make her aware that the treatment team was in agreement with Pt receiving a day pass to tour the facility, however, CM was waiting to hear back if Pt's TCM team could accompany her  CM received a voicemail from Vel Baldwin at [employer] who reported that she does not think they can do the initial plan, as Pt was being transferred from Zone 2 to Zone 3  She said that she thinks that [address] will need to do the initial plan, but that CM should follow-up with Pt's Supports Coordinator, Paty Martinez, when she returns to the office on Tuesday

## 2019-08-26 NOTE — PROGRESS NOTES
Pt is in room, talking to self loudly  Writer went to talk with pt, pt can be irritable, but calms quickly  Pt holding Left hand tightly closed  Pt opened hand stating "its my pills " Hand was observed to be empty  Pt then began talking about charging a cell phone for mom  Pt oriented to person and place, "I'm in the hospital " does have trouble with the time of day  Pt received PRN Ativan 1mg at 0001 for increased anxiety  Pt is in room next to nurse's station  Will continue to monitor and support

## 2019-08-26 NOTE — PROGRESS NOTES
Pt has been in bed all day  Refused to go to bfst or lunch  Pt mumbles answers but does not open eyes to talk to RN  Says she's tired and wants to sleep  At times not answering questions  Would not engage in conversation with this writer

## 2019-08-26 NOTE — PLAN OF CARE
Problem: Ineffective Coping  Goal: Identifies ineffective coping skills  Outcome: Progressing  Goal: Identifies healthy coping skills  Outcome: Progressing  Goal: Demonstrates healthy coping skills  Outcome: Progressing  Goal: Participates in unit activities  Description  Interventions:  - Provide therapeutic environment   - Provide required programming   - Redirect inappropriate behaviors   Outcome: Progressing  Goal: Patient/Family participate in treatment and DC plans  Description  Interventions:  - Provide therapeutic environment  Outcome: Progressing  Goal: Patient/Family verbalizes awareness of resources  Outcome: Progressing     Problem: Depression  Goal: Treatment Goal: Demonstrate behavioral control of depressive symptoms, verbalize feelings of improved mood/affect, and adopt new coping skills prior to discharge  Outcome: Progressing  Goal: Verbalize thoughts and feelings  Description  Interventions:  - Assess and re-assess patient's level of risk   - Engage patient in 1:1 interactions, daily, for a minimum of 15 minutes   - Encourage patient to express feelings, fears, frustrations, hopes   Outcome: Progressing  Goal: Refrain from harming self  Description  Interventions:  - Monitor patient closely, per order   - Supervise medication ingestion, monitor effects and side effects   Outcome: Progressing  Goal: Refrain from isolation  Description  Interventions:  - Develop a trusting relationship   - Encourage socialization   Outcome: Progressing  Goal: Refrain from self-neglect  Outcome: Progressing  Goal: Attend and participate in unit activities, including therapeutic, recreational, and educational groups  Description  Interventions:  - Provide therapeutic and educational activities daily, encourage attendance and participation, and document same in the medical record   Outcome: Progressing  Goal: Complete daily ADLs, including personal hygiene independently, as able  Description  Interventions:  - Observe, teach, and assist patient with ADLS  -  Monitor and promote a balance of rest/activity, with adequate nutrition and elimination   Outcome: Progressing

## 2019-08-26 NOTE — PROGRESS NOTES
Pt visible on unit, social with peers  Awake in dayroom throughout shift,  participates appropriately in group

## 2019-08-26 NOTE — PROGRESS NOTES
Patient up and awake at 0155  Patient assessed by this writer  Patient stating HA frontal pain level 7/10  Patient received acetaminophen 975mg for pain  Patient was observed sleeping within the hour and remains asleep at this hour

## 2019-08-27 PROCEDURE — 99232 SBSQ HOSP IP/OBS MODERATE 35: CPT | Performed by: PSYCHIATRY & NEUROLOGY

## 2019-08-27 RX ADMIN — LORAZEPAM 1 MG: 1 TABLET ORAL at 09:30

## 2019-08-27 RX ADMIN — FAMOTIDINE 20 MG: 20 TABLET ORAL at 09:27

## 2019-08-27 RX ADMIN — DIVALPROEX SODIUM 750 MG: 500 TABLET, FILM COATED, EXTENDED RELEASE ORAL at 21:04

## 2019-08-27 RX ADMIN — LORATADINE 10 MG: 10 TABLET ORAL at 09:27

## 2019-08-27 RX ADMIN — BENZTROPINE MESYLATE 1 MG: 1 TABLET ORAL at 09:27

## 2019-08-27 RX ADMIN — RISPERIDONE 3 MG: 2 TABLET ORAL at 21:04

## 2019-08-27 RX ADMIN — FAMOTIDINE 20 MG: 20 TABLET ORAL at 21:03

## 2019-08-27 RX ADMIN — BENZTROPINE MESYLATE 1 MG: 1 TABLET ORAL at 21:03

## 2019-08-27 NOTE — PROGRESS NOTES
Status: Pt is seclusive to her room & denies SI/HI  She was refusing meal but with encouragement ate 25% of dinner  There was no confusion noted in the evening  Medication: no PRN since 12:01 AM Ativan  D/C: TBD - CM is waiting to hear back from TCM to see if she can take Pt to [address] [facility]        08/27/19 3159   Team Meeting   Meeting Type Daily Rounds   Team Members Present   Team Members Present Physician;Nurse;;Occupational Therapist   Physician Team Member Dr Sera Foreman / Nikky Bush Team Member Anabelle Mathis / Vy Lundberg Management Team Member [id number] / Carlos Rodas   OT Team Member Gordon   Patient/Family Present   Patient Present No   Patient's Family Present No

## 2019-08-27 NOTE — PROGRESS NOTES
Progress Note - [id number] [employer] Jenny 30 y o  female MRN: [id number]  Unit/Bed#: U 255-02 Encounter: [id number]    Assessment/Plan   Principal Problem:    Bipolar 1 disorder, depressed, severe ([facility] )  Active Problems:    RADHA (generalized anxiety disorder)    TBI (traumatic brain injury) ([facility] )    Autism spectrum    Tourette's    29 y/o Female with bipolar I d/o, RADHA, ASD, Tourette's-  She is compliant with medications and reports slow improvement with mood and anxiety along with disorientation to time of day  No signs of EPS, continues to deny SI or HI  Recommended Treatment:   Continue current medical regimen  Continue with group therapy, milieu therapy and occupational therapy  Risks, benefits and possible side effects of Medications: Risks, benefits, and possible side effects of medications have been explained to the patient, who verbalizes understanding  Progress Toward Goals: slow improvement    ------------------------------------------------------------    Subjective: Jena Virgen has been compliant with medications without acute side effects  She reports improving appetite and energy, but states her mood is "not good at all " She states she is more afraid because the men in group tend to make inappropriate jokes and she cannot tell if they're joking or not  She also reports feeling "sick to her stomach" because she has not been able to get a hold of her mom even after leaving a couple of messages  She is more alert and active in group today  She denies any nausea/vomitting/diarrhea today, but reports feeling "extra shaky today" and "feeling like her heart is beating out of her chest " She continues to have confusion with the time of day, and cannot tell if it's day or night  She denies any suicidal or homicidal ideations  Patient is consenting for safety on the unit      Psychiatric Review of Systems:  Behavior over the last 24 hours: improved  Sleep: improved  Appetite: good, improving  Medication side effects: none  ROS: Complete review of systems is negative except as noted above      Vital signs in last 24 hours:  Temp:  [97 8 °F (36 6 °C)] 97 8 °F (36 6 °C)  HR:  [80] 80  Resp:  [16] 16  BP: (120)/(59) 120/59    Mental Status Evaluation:  Appearance:  alert, child-like and appropriate grooming and hygiene   Behavior:  cooperative, pleasant, sitting comfortably in chair, good eye contact, no abnormal movements and normal gait and balance   Speech:  spontaneous, clear, normal rate and normal volume   Mood:  euthymic   Affect:  mood-congruent, appropriate range and euthymic   Thought Process:  decreased rate of thoughts   Thought Content: no verbalized delusions, no overt paranoia, no obsessive thinking   Perceptual disturbances: no auditory hallucinations and no visual hallucinations   Risk Potential: No active or passive suicidal or homicidal ideation, Low potential for aggression   Cognition: attention span appeared shorter than expected for age   Insight:  Limited   Judgment: Limited       Current Medications:    Current Facility-Administered Medications:  acetaminophen 325 mg Oral Q6H PRN Elham Signs, PA-C   acetaminophen 975 mg Oral Q6H PRN Elham Signs, PA-C   benztropine 1 mg Intramuscular Q6H PRN Elham Signs, PA-C   benztropine 1 mg Oral Q6H PRN Elham Signs, PA-C   benztropine 1 mg Oral BID Elham Signs, PA-C   divalproex sodium 750 mg Oral HS Chika Posada MD   famotidine 20 mg Oral BID Verita Labor III, DO   ibuprofen 600 mg Oral Q8H PRN Elham Signs, PA-C   loratadine 10 mg Oral Daily Elham Signs, PA-C   LORazepam 2 mg Intramuscular Q6H PRN Elham Signs, PA-C   LORazepam 1 mg Oral Q6H PRN Elham Signs, PA-C   magnesium hydroxide 30 mL Oral Daily PRN Elham Signs, PA-C   OLANZapine 10 mg Intramuscular Q8H PRN Elham Signs, PA-C   OLANZapine 10 mg Oral Q8H PRN Elham Signs, PA-C   ondansetron 4 mg Oral Q6H PRN Yoni Grant   risperiDONE 1 mg Oral Q3H PRN Kadie Ayoub PA-C   risperiDONE 3 mg Oral HS Yolanda Benson MD   traZODone 50 mg Oral HS PRN Kadie Ayoub PA-C       Behavioral Health Medications: all current active meds have been reviewed  Changes as above  Laboratory results:  I have personally reviewed all pertinent laboratory/tests results  No results found for this or any previous visit (from the past 48 hour(s))  This note has been constructed using a voice recognition system  There may be translation, syntax, or grammatical errors  If you have any questions, please contact the dictating provider

## 2019-08-27 NOTE — PROGRESS NOTES
Pt reports feeling anxious today  Reports feeling very anxious to discharge stating "I wish I could leave right now"  Pt reports social anxiety and feels she does not "get along" with some of her peers  Pt expresses feeling sad that she cannot return to live with her mom  Administered PRN Ativan per request for anxiety  Pt continues to sit on her bed  Reports minimal effectiveness from Ativan  States she feels safe, denies any needs at this time

## 2019-08-27 NOTE — CASE MANAGEMENT
CM returned a phone call to Pt's supports coordinator, Zac Cornell, @ [phone] & left a message seeking a returned call to review referral to [facility] [place]  [facility]  AQUILES received a voicemail from Pt's TCM, [place], who reported she would be available on Weds or Thurs to take Pt to tour [facility]  CM contacted [facility] @ [facility] @ [phone] & left a message seeking to schedule a tour for Pt for tomorrow or Thurs & requesting a returned call

## 2019-08-27 NOTE — PROGRESS NOTES
Pt slept majority of the night however would wake for short intervals  Able to fall asleep soon after

## 2019-08-28 PROCEDURE — 99232 SBSQ HOSP IP/OBS MODERATE 35: CPT | Performed by: PSYCHIATRY & NEUROLOGY

## 2019-08-28 RX ADMIN — DIVALPROEX SODIUM 750 MG: 500 TABLET, FILM COATED, EXTENDED RELEASE ORAL at 20:59

## 2019-08-28 RX ADMIN — BENZTROPINE MESYLATE 1 MG: 1 TABLET ORAL at 17:04

## 2019-08-28 RX ADMIN — BENZTROPINE MESYLATE 1 MG: 1 TABLET ORAL at 09:29

## 2019-08-28 RX ADMIN — RISPERIDONE 3 MG: 2 TABLET ORAL at 21:00

## 2019-08-28 RX ADMIN — LORATADINE 10 MG: 10 TABLET ORAL at 09:29

## 2019-08-28 RX ADMIN — FAMOTIDINE 20 MG: 20 TABLET ORAL at 17:04

## 2019-08-28 RX ADMIN — FAMOTIDINE 20 MG: 20 TABLET ORAL at 09:29

## 2019-08-28 NOTE — CASE MANAGEMENT
CM received a return call from Michael Thomas at [place]  [employer], who reported they had agreed for the tour at 2 PM tomorrow  She said that Pt's TCM, Tavares Patterson, would be bringing her  CM outreached to Tavares Patterson, who confirmed this & said she would  Pt between 1/1:30 PM   Tavares Patterson said that she didn't really know Pt & had only met her once

## 2019-08-28 NOTE — CASE MANAGEMENT
CM received the following email from Pt's TCM, Iftikhar Quintero:  Edinson Baca, as of 6:05pm, Lyle Zuniga of [facility] indicated that someone from their facility will pick Tameka up from hospital for the tour and return her to hospital  They said their facility is only 20minutes away

## 2019-08-28 NOTE — PROGRESS NOTES
Status: Pt reports being depressed & misses her mom  Pt was upset with her peers  Pt is scheduled to [address] today at 2 PM     Medications:prn Ativan   D/C: TBD     08/28/19 0735   Team Meeting   Meeting Type Daily Rounds   Team Members Present   Team Members Present Physician;Nurse;;Occupational Therapist   Physician Team Member Dr Tomasa Robins / Dejan Merchant Team Member Bisi Arellano / Oma Peña Management Team Member Zoe Leigh / Garcia Estrada   OT Team Member Gordon   Patient/Family Present   Patient Present No   Patient's Family Present No

## 2019-08-28 NOTE — PROGRESS NOTES
Pt remains scant in conversation however polite with staff  Seclusive to room and refused breakfast  Encouraged pt to walk to dining room to see what was on her tray however pt politely declined  Encouraged out of bed to walk halls, attend groups however pt declined this as well  Pt states "I just feel too tired to right now " pt denies SI/HI however does admit to depression  Denies current anxiety at this time  Pt shrugged her shoulders and states "I'm just down right now " Reports missing home however understands she cannot discharge to there  States she is waiting to speak to  to discuss discharge planning/upcoming meeting  Denies concerns/questions at this time

## 2019-08-28 NOTE — CASE MANAGEMENT
CM outreached to Michael Thomas of [facility], who reported she had just found out that they were going to be needing to make transportation arrangements for Pt for today  CM reviewed email received from Quincy Arana & had responded to ask if she was planning to meet Pt at the facility, but she had not responded yet  Michael Thomas said that the clinical director was not feeling well & they were wondering if the visit could be rescheduled for tomorrow, as they also need to figure out transportation? CM reviewed that Pt had not been told of the visit yet, so it should be okay,however, the treatment team would like to keep d/c planning moving forward  Michael Thomas said she was waiting to hear back from Quincy Arana to see if this would be okay for her, as Michael William was under the impression she would be meeting Pt at [facility], just not providing transportation  CM was notified by Michael Thomas that Pt's ICM,Irina, was not planning to attend at all, so [facility] would prefer to reschedule for tomorrow at 2 PM; CM agreed  Michael Thomas said that Quincy Arana had said she had only met Pt once before, & it wasn't necessarily a positive experience  CM outreached to [facility] MH/DS CHIPPS Coordinator, Julio C Schwartz, & reviewed the above, that as Pt's primary community support, they should be participating  William Chase reviewed that he was outreaching to the director of TCM services & would follow-up  CM reviewed the tour had been postponed to tomorrow at 2 PM   CM received a call from Vinny Parnell of [facility] MH/DS who reported that Pt's TCM, Quincy Arana, & her supervisor, Yasemin Rao would be picking Pt up & taking her for the tour tomorrow  CM later notified by Michael Thomas that [facility] had requested it be moved to 11:30 AM & CM reported that would be fine  CM met with Pt who was pleasant & happy & said she was having a good day so far    CM & Pt talked about her not eating breakfast & she said that at home, she often would sleep until 11:30 AM & has never been a big breakfast eater

## 2019-08-28 NOTE — PLAN OF CARE
Pt's TCM & TCM Supervisor will take her to [facility] [place] [address]  [facility]

## 2019-08-28 NOTE — PROGRESS NOTES
Pt is pleasant and cooperative during interaction  States she ate well at dinner and is seen drinking Ensure  Pt denies any needs at this time  Pt appears anxious when talking [facility]

## 2019-08-28 NOTE — PLAN OF CARE
Problem: Ineffective Coping  Goal: Identifies ineffective coping skills  Outcome: Progressing  Goal: Identifies healthy coping skills  Outcome: Progressing  Goal: Demonstrates healthy coping skills  Outcome: Progressing  Goal: Patient/Family participate in treatment and DC plans  Description  Interventions:  - Provide therapeutic environment  Outcome: Progressing  Goal: Patient/Family verbalizes awareness of resources  Outcome: Progressing     Problem: Depression  Goal: Treatment Goal: Demonstrate behavioral control of depressive symptoms, verbalize feelings of improved mood/affect, and adopt new coping skills prior to discharge  Outcome: Progressing  Goal: Verbalize thoughts and feelings  Description  Interventions:  - Assess and re-assess patient's level of risk   - Engage patient in 1:1 interactions, daily, for a minimum of 15 minutes   - Encourage patient to express feelings, fears, frustrations, hopes   Outcome: Progressing  Goal: Refrain from harming self  Description  Interventions:  - Monitor patient closely, per order   - Supervise medication ingestion, monitor effects and side effects   Outcome: Progressing  Goal: Refrain from self-neglect  Outcome: Progressing  Goal: Complete daily ADLs, including personal hygiene independently, as able  Description  Interventions:  - Observe, teach, and assist patient with ADLS  -  Monitor and promote a balance of rest/activity, with adequate nutrition and elimination   Outcome: Progressing     Problem: Risk for Violence/Aggression Toward Others  Goal: Treatment Goal: Refrain from acts of violence/aggression during length of stay, and demonstrate improved impulse control at the time of discharge  Outcome: Progressing  Goal: Refrain from harming others  Outcome: Progressing     Problem: Nutrition/Hydration-ADULT  Goal: Nutrient/Hydration intake appropriate for improving, restoring or maintaining nutritional needs  Description  Monitor and assess patient's nutrition/hydration status for malnutrition  Collaborate with interdisciplinary team and initiate plan and interventions as ordered  Monitor patient's weight and dietary intake as ordered or per policy  Utilize nutrition screening tool and intervene as necessary  Determine patient's food preferences and provide high-protein, high-caloric foods as appropriate       INTERVENTIONS:  - Monitor oral intake, urinary output, labs, and treatment plans  - Assess nutrition and hydration status and recommend course of action  - Evaluate amount of meals eaten  - Assist patient with eating if necessary   - Allow adequate time for meals  - Recommend/ encourage appropriate diets, oral nutritional supplements, and vitamin/mineral supplements  - Order, calculate, and assess calorie counts as needed  - Recommend, monitor, and adjust tube feedings and TPN/PPN based on assessed needs  - Assess need for intravenous fluids  - Provide specific nutrition/hydration education as appropriate  - Include patient/family/caregiver in decisions related to nutrition  Outcome: Progressing     Problem: Ineffective Coping  Goal: Participates in unit activities  Description  Interventions:  - Provide therapeutic environment   - Provide required programming   - Redirect inappropriate behaviors   Outcome: Not Progressing     Problem: Depression  Goal: Refrain from isolation  Description  Interventions:  - Develop a trusting relationship   - Encourage socialization   Outcome: Not Progressing  Goal: Attend and participate in unit activities, including therapeutic, recreational, and educational groups  Description  Interventions:  - Provide therapeutic and educational activities daily, encourage attendance and participation, and document same in the medical record   Outcome: Not Progressing

## 2019-08-28 NOTE — PROGRESS NOTES
Progress Note - [id number] [employer] Jenny 30 y o  female MRN: [id number]  Unit/Bed#: U 255-02 Encounter: [id number]    Assessment/Plan   Principal Problem:    Bipolar 1 disorder, depressed, severe ([facility] )  Active Problems:    RADHA (generalized anxiety disorder)    TBI (traumatic brain injury) ([facility] )    Autism spectrum    Tourette's    31 y/o Female with bipolar I d/o, RADHA, ASD, Tourette's-  She is compliant with medications with no acute side effects  She is showing slow improvement in mood and anxiety  No signs of EPS, she continues to deny Suicidal ideation/intent/plan or Homicidal ideation/intent/plan  Recommended Treatment:   Continue Current Medical Regimen  Continue with group therapy, milieu therapy and occupational therapy  Plan for day visit to [facility] tomorrow at 11:30am  Disposition planning is pending      Risks, benefits and possible side effects of Medications: Risks, benefits, and possible side effects of medications have been explained to the patient, who verbalizes understanding  Progress Toward Goals: slow improvement    ------------------------------------------------------------    Subjective: Douglas Saini has been compliant with medications without acute side effects  She reports good sleep overnight, but remains confined to her room today and reports feeling tired  She states she is "feeling down" today because she has not heard from her mom  She mailed her out a letter to her and is hopeful about getting a response  She admits to decreased appetite and energy today  She denies any Suicidal ideations/intent/plan or Homicidal ideations/intent/plan  Patient is consenting for safety on the unit  Psychiatric Review of Systems:  Behavior over the last 24 hours: regressed  Sleep: normal  Appetite: good, decreased  Medication side effects: none  ROS: Complete review of systems is negative except as noted above      Vital signs in last 24 hours:  Temp:  [97 5 °F (36 4 °C)-97 6 °F (36 4 °C)] 97 6 °F (36 4 °C)  HR:  [] 83  Resp:  [17-18] 18  BP: (113-115)/(62-71) 113/62    Mental Status Evaluation:  Appearance:  alert, child-like, appropriate grooming and hygiene and smiling   Behavior:  cooperative, pleasant, good eye contact, no abnormal movements and normal gait and balance   Speech:  spontaneous, clear, normal rate, normal volume and coherent   Mood:  "feeling down"   Affect:  mood-congruent and depressed   Thought Process:  decreased rate of thoughts   Thought Content: no verbalized delusions, no overt paranoia, no obsessive thinking   Perceptual disturbances: no auditory hallucinations and no visual hallucinations   Risk Potential: No active or passive suicidal or homicidal ideation, Low potential for aggression   Cognition: appears to be below average intelligence and attention span appeared shorter than expected for age   Insight:  Limited   Judgment: Limited       Current Medications:    Current Facility-Administered Medications:  acetaminophen 325 mg Oral Q6H PRN Viona Prom, PA-C   acetaminophen 975 mg Oral Q6H PRN Viona Prom, PA-C   benztropine 1 mg Intramuscular Q6H PRN Viona Prom, PA-C   benztropine 1 mg Oral Q6H PRN Viona Prom, PA-C   benztropine 1 mg Oral BID Viona Prom, PA-C   divalproex sodium 750 mg Oral HS Erika Ruiz MD   famotidine 20 mg Oral BID Marlou Citrin III, DO   ibuprofen 600 mg Oral Q8H PRN Viona Prom, PA-C   loratadine 10 mg Oral Daily Viona Prom, PA-C   LORazepam 2 mg Intramuscular Q6H PRN Viona Prom, PA-C   LORazepam 1 mg Oral Q6H PRN Viona Prom, PA-C   magnesium hydroxide 30 mL Oral Daily PRN Viona Prom, PA-C   OLANZapine 10 mg Intramuscular Q8H PRN Viona Prom, PA-C   OLANZapine 10 mg Oral Q8H PRN Viona Prom, PA-C   ondansetron 4 mg Oral Q6H PRN Yoni Grant   risperiDONE 1 mg Oral Q3H PRN Viona Prom, PA-C   risperiDONE 3 mg Oral HS Marjorie Deleon MD traZODone 50 mg Oral HS PRN Merritt Christina PA-C       Behavioral Health Medications: all current active meds have been reviewed  Changes as above  Laboratory results:  I have personally reviewed all pertinent laboratory/tests results  No results found for this or any previous visit (from the past 48 hour(s))  This note has been constructed using a voice recognition system  There may be translation, syntax, or grammatical errors  If you have any questions, please contact the dictating provider

## 2019-08-28 NOTE — PROGRESS NOTES
Pt appears to be sleeping throughout shift, denies attending group  Ate snack in room  Pt reports mood is "not good", "everything has been bothering me"  Pt reports she did mail a letter to mother  Pt reassured, encouraged to participate in activities  Appropriate verbal interactions, calm demeanor during conversation

## 2019-08-29 PROCEDURE — 99232 SBSQ HOSP IP/OBS MODERATE 35: CPT | Performed by: PSYCHIATRY & NEUROLOGY

## 2019-08-29 RX ADMIN — DIVALPROEX SODIUM 750 MG: 500 TABLET, FILM COATED, EXTENDED RELEASE ORAL at 21:21

## 2019-08-29 RX ADMIN — FAMOTIDINE 20 MG: 20 TABLET ORAL at 18:12

## 2019-08-29 RX ADMIN — BENZTROPINE MESYLATE 1 MG: 1 TABLET ORAL at 18:12

## 2019-08-29 RX ADMIN — BENZTROPINE MESYLATE 1 MG: 1 TABLET ORAL at 10:03

## 2019-08-29 RX ADMIN — FAMOTIDINE 20 MG: 20 TABLET ORAL at 10:03

## 2019-08-29 RX ADMIN — RISPERIDONE 3 MG: 2 TABLET ORAL at 21:21

## 2019-08-29 RX ADMIN — LORATADINE 10 MG: 10 TABLET ORAL at 10:02

## 2019-08-29 NOTE — CASE MANAGEMENT
AQUILES spoke with Rebecca at [facility] who reported they could have Pt for the tour on Weds at 2 PM   She said that she had outreach to [employer] ICM, Catalina Madrigal, to see if she was available  Rebecca said that she would let AQUILES know once Catalina Madrigal confirms, so the hospital can make plans

## 2019-08-29 NOTE — PROGRESS NOTES
Status: Pt seclusive to her room  Medication: no changes / no prns   D/C: TBD - tour for [facility] rescheduled for today     08/29/19 0731   Team Meeting   Meeting Type Daily Rounds   Team Members Present   Team Members Present Physician;Nurse;;Occupational Therapist   Physician Team Member Dr Jasson Barrett / Adarsh Mathew Team Member Raquel Lei / Lavinia Crouch Management Team Member Tyesha Monroy / Aron Prader   OT Team Member Gordon   Patient/Family Present   Patient Present No   Patient's Family Present No

## 2019-08-29 NOTE — PLAN OF CARE
Problem: Depression  Goal: Verbalize thoughts and feelings  Description  Interventions:  - Assess and re-assess patient's level of risk   - Engage patient in 1:1 interactions, daily, for a minimum of 15 minutes   - Encourage patient to express feelings, fears, frustrations, hopes   Outcome: Progressing  Goal: Refrain from harming self  Description  Interventions:  - Monitor patient closely, per order   - Supervise medication ingestion, monitor effects and side effects   Outcome: Progressing

## 2019-08-29 NOTE — CASE MANAGEMENT
CM met with Pt who was resting in bed  CM reviewed with Pt that her ICM, Fanny Chapman, & the ICM Supervisor, Mathew, were going to be picking her up around 11 today, to take her to [address] [facility] [employer]  CM suggested that Pt get up & showered & eat some breakfast so she is ready to go when they arrive  CM acknowledged Pt's feelings of anxiousness & fear of not returning to live with her mom & validated them  Pt said she is a little excited & got up & got her things together to get a shower

## 2019-08-29 NOTE — PROGRESS NOTES
Pt lying in bed, depressed affect  Reports being exhausted from trip to group home today  States being "terrified", discussed how change can be scary, pt may feel more comfortable as time goes by  Denies SI, HI, AVH at this time  Poor appetite, no dinner tonight

## 2019-08-29 NOTE — PROGRESS NOTES
Progress Note - [id number] Jenny 30 y o  female MRN: [id number]  Unit/Bed#: U 255-02 Encounter: [id number]    Assessment/Plan   Principal Problem:    Bipolar 1 disorder, depressed, severe ([address] )  Active Problems:    RADHA (generalized anxiety disorder)    TBI (traumatic brain injury) ([address] )    Autism spectrum    Tourette's    29 y/o Female with bipolar I d/o, RADHA, ASD, Tourette's-  She is compliant with medications with no acute side effects  She is showing slow improvement in mood and anxiety  No signs of EPS, she continues to deny Suicidal ideation/intent/plan or Homicidal ideation/intent/plan      Recommended Treatment:   Continue Current Medical Regimen  Continue with group therapy, milieu therapy and occupational therapy  Day visit to [facility] today  Disposition planning is pending      Risks, benefits and possible side effects of Medications: Risks, benefits, and possible side effects of medications have been explained to the patient, who verbalizes understanding  Progress Toward Goals: slow improvement    ------------------------------------------------------------    Subjective: Enrique Perdue has been compliant with medications without acute side effects  She reports good sleep overnight, improving appetite, good energy and brighter mood  She denies any suicidal or homicidal ideation/intent/plan  She states she is excited to [place] today, although she reports still being worried about hearing back from her mom  She reports good participation in group and states she is feeling more comfortable  Patient is consenting for safety on the unit  Psychiatric Review of Systems:  Behavior over the last 24 hours: improved  Sleep: normal  Appetite: good, adequate  Medication side effects: none  ROS: Complete review of systems is negative except as noted above      Vital signs in last 24 hours:  Temp:  [97 4 °F (36 3 °C)-98 °F (36 7 °C)] 97 4 °F (36 3 °C)  HR:  [] 82  Resp: [16-17] 16  BP: (112-115)/(56-59) 115/56    Mental Status Evaluation:  Appearance:  alert, child-like and appropriate grooming and hygiene   Behavior:  cooperative, pleasant, sitting comfortably in chair, good eye contact, no abnormal movements and normal gait and balance   Speech:  spontaneous, clear, normal rate, normal volume and coherent   Mood:  "feeling awesome"   Affect:  mood-congruent, appropriate range and euthymic   Thought Process:  decreased rate of thoughts   Thought Content: no verbalized delusions, no overt paranoia, no obsessive thinking   Perceptual disturbances: no auditory hallucinations and no visual hallucinations   Risk Potential: No active or passive suicidal or homicidal ideation, Low potential for aggression   Cognition: appears to be below average intelligence and attention span appeared shorter than expected for age   Insight:  Limited   Judgment: Limited       Current Medications:    Current Facility-Administered Medications:  acetaminophen 325 mg Oral Q6H PRN Juana Alias, PA-C   acetaminophen 975 mg Oral Q6H PRN Juana Alias, PA-C   benztropine 1 mg Intramuscular Q6H PRN Juana Alias, PA-C   benztropine 1 mg Oral Q6H PRN Juana Alias, PA-C   benztropine 1 mg Oral BID Juana Alias, PA-C   divalproex sodium 750 mg Oral HS Janeen Escoto MD   famotidine 20 mg Oral BID Flavio Ave III, DO   ibuprofen 600 mg Oral Q8H PRN Juana Alias, PA-C   loratadine 10 mg Oral Daily Juana Alias, PA-C   LORazepam 2 mg Intramuscular Q6H PRN Juana Alias, PA-C   LORazepam 1 mg Oral Q6H PRN Juana Alias, PA-C   magnesium hydroxide 30 mL Oral Daily PRN Juana Alias, PA-C   OLANZapine 10 mg Intramuscular Q8H PRN Juana Alias, PA-C   OLANZapine 10 mg Oral Q8H PRN Juana Alias, PA-C   ondansetron 4 mg Oral Q6H PRN Yoni Grant   risperiDONE 1 mg Oral Q3H PRN Juana Alias, PA-C   risperiDONE 3 mg Oral HS Sandy Casey MD   traZODone 50 mg Oral LEORA Haddad PA-C       Behavioral Health Medications: all current active meds have been reviewed  Changes as above  Laboratory results:  I have personally reviewed all pertinent laboratory/tests results  No results found for this or any previous visit (from the past 48 hour(s))  This note has been constructed using a voice recognition system  There may be translation, syntax, or grammatical errors  If you have any questions, please contact the dictating provider

## 2019-08-29 NOTE — PROGRESS NOTES
Pt denies SI/HI/AVH  Has a brighter effect  States she is feeling "awesome" because she is going to look at a housing facility today  Reports sleeping well  Declined breakfast  Denies having any questions or concerns

## 2019-08-29 NOTE — CASE MANAGEMENT
CM met with ICM Ponce Christopher & her supervisor, Arlyn Schirmer, when they arrived to take Pt to [facility]  CM reviewed that they had requested Pt's lunch tray be held, so that she can eat when she returns  CM reviewed that Pt does get upset when the topic is on mom & no longer living with mom, however, she is able to manage her emotions  Pt was brought off the unit & left her staff

## 2019-08-29 NOTE — PROGRESS NOTES
Patient observed sitting up quietly in bed at one point throughout the night during rounding  Patient was able and observed sleeping for several hours in between wakeful periods and currently remains asleep

## 2019-08-30 PROCEDURE — 99232 SBSQ HOSP IP/OBS MODERATE 35: CPT | Performed by: PSYCHIATRY & NEUROLOGY

## 2019-08-30 RX ADMIN — LORATADINE 10 MG: 10 TABLET ORAL at 10:07

## 2019-08-30 RX ADMIN — FAMOTIDINE 20 MG: 20 TABLET ORAL at 18:04

## 2019-08-30 RX ADMIN — RISPERIDONE 3 MG: 2 TABLET ORAL at 21:10

## 2019-08-30 RX ADMIN — DIVALPROEX SODIUM 750 MG: 500 TABLET, FILM COATED, EXTENDED RELEASE ORAL at 21:10

## 2019-08-30 RX ADMIN — FAMOTIDINE 20 MG: 20 TABLET ORAL at 10:07

## 2019-08-30 RX ADMIN — LORAZEPAM 1 MG: 1 TABLET ORAL at 14:17

## 2019-08-30 RX ADMIN — BENZTROPINE MESYLATE 1 MG: 1 TABLET ORAL at 18:04

## 2019-08-30 RX ADMIN — BENZTROPINE MESYLATE 1 MG: 1 TABLET ORAL at 10:07

## 2019-08-30 NOTE — CASE MANAGEMENT
CM received a phone call from [place] [id number] who handles the financial aspects of admissions  She said that she needs a copy of an award letter & CM said that from her understanding, Pt's mom is her rep payee  Mildred Quiñonez confirmed the phone number for Mary Grace Draper, Pt's mom & said that she would outreach to her  Mildred Lanzaevre said she would also need copies of insurance cards, etc   CM was able to fax copies of Pt's ID & Medicare card to Mildred Quiñonez @ [phone]  Mildred Quiñonez called back & said that she spoke with Avril Burns who said that she couldn't help her & told her to call [facility]  CM reviewed that Pt had signed an HEIDI for [facility] already & it was sent to Michael Quiñonez agreed to follow-up with her & see if she can contact [facility]

## 2019-08-30 NOTE — PROGRESS NOTES
Progress Note - [id number] Momail Jenny 30 y o  female MRN: [id number]  Unit/Bed#: -02 Encounter: [id number]    Assessment/Plan   Principal Problem:    Bipolar 1 disorder, depressed, severe ([facility] )  Active Problems:    RADHA (generalized anxiety disorder)    TBI (traumatic brain injury) ([facility] )    Autism spectrum    Tourette's    29 y/o Female with bipolar I d/o, RADHA, ASD, Tourette's-  She is compliant with medications with no acute side effects  She is showing slow improvement in mood and anxiety  No signs of EPS, she continues to deny Suicidal ideation/intent/plan or Homicidal ideation/intent/plan  Recommended Treatment:   Continue current medical regimen  Continue with group therapy, milieu therapy and occupational therapy  Initiate disposition planning ([facility] feels she is appropriate for their TBI unit)  Risks, benefits and possible side effects of Medications: Risks, benefits, and possible side effects of medications have been explained to the patient, who verbalizes understanding  Progress Toward Goals: slow improvement    ------------------------------------------------------------    Subjective: Jena Virgen has been compliant with medications without acute side effects  She reports good sleep last night, but has decreased energy and appetite today  She states she is very tired today and is reclusive to her room, laying in bed  She states her mood is "okay " She states her visit to [facility] went well and she enjoyed how beautiful the outer grounds were, but she was scared since the place was new and she did not know anyone  She remains concerned about not being able to communicate with her mom and requests to speak with her mom to confirm that she cannot return home before agreeing to go to [facility]  She denies any suicidal or homicidal ideations/intent/plan  Patient is consenting for safety on the unit      Psychiatric Review of Systems:  Behavior over the last 24 hours: unchanged  Sleep: normal  Appetite: good, decreased  Medication side effects: none  ROS: fatigue    Vital signs in last 24 hours:  Temp:  [96 6 °F (35 9 °C)-97 4 °F (36 3 °C)] 96 6 °F (35 9 °C)  HR:  [81-94] 81  Resp:  [16] 16  BP: (111-114)/(56-60) 114/56    Mental Status Evaluation:  Appearance:  drowsy and appears younger than stated age   Behavior:  pleasant, limited cooperativity with interview, laying in bed, fair eye contact and no abnormal movements   Speech:  clear, normal rate and soft   Mood:  "feels ok"   Affect:  depressed   Thought Process:  decreased rate of thoughts   Thought Content: no verbalized delusions, no overt paranoia, no obsessive thinking   Perceptual disturbances: no auditory hallucinations and no visual hallucinations   Risk Potential: No active or passive suicidal or homicidal ideation, Low potential for aggression   Cognition: appears to be below average intelligence, impaired abstract reasoning and attention span appeared shorter than expected for age   Insight:  Limited   Judgment: Limited       Current Medications:    Current Facility-Administered Medications:  acetaminophen 325 mg Oral Q6H PRN Pranav Gearing, PA-C   acetaminophen 975 mg Oral Q6H PRN Pranav Gearing, PA-C   benztropine 1 mg Intramuscular Q6H PRN Pranav Gearing, PA-C   benztropine 1 mg Oral Q6H PRN Pranav Gearing, PA-C   benztropine 1 mg Oral BID Pranav Gearing, PA-C   divalproex sodium 750 mg Oral HS Sandy Escobar MD   famotidine 20 mg Oral BID Lc Adorno III, DO   ibuprofen 600 mg Oral Q8H PRN Pranav Gearing, PA-C   loratadine 10 mg Oral Daily Pranav Gearing, PA-C   LORazepam 2 mg Intramuscular Q6H PRN Pranav Gearing, PA-C   LORazepam 1 mg Oral Q6H PRN Pranav Gearing, PA-C   magnesium hydroxide 30 mL Oral Daily PRN Pranav Gearing, PA-C   OLANZapine 10 mg Intramuscular Q8H PRN Pranav Gearing, PA-C   OLANZapine 10 mg Oral Q8H PRN Pranav Gearing, PA-C   ondansetron 4 mg Oral Q6H RACHELL Grant   risperiDONE 1 mg Oral Q3H PRMICHAEL Menchaca PA-C   risperiDONE 3 mg Oral HS Louie Berg MD   traZODone 50 mg Oral HS PRMICHAEL Menchaca PA-C       Behavioral Health Medications: all current active meds have been reviewed  Changes as above  Laboratory results:  I have personally reviewed all pertinent laboratory/tests results  No results found for this or any previous visit (from the past 48 hour(s))  This note has been constructed using a voice recognition system  There may be translation, syntax, or grammatical errors  If you have any questions, please contact the dictating provider

## 2019-08-30 NOTE — PROGRESS NOTES
Pt lying in bed with eyes closed but easy to rouse  Pt said she felt exhausted  Visited [facility] yesterday, she said it was a scary place  Pt blunted  Pleasant and cooperative with writer  Denied SI, HI and AVH

## 2019-08-30 NOTE — CASE MANAGEMENT
CM met with Pt, & her ICM, Nneka Harrell, & ICM Supervisor, Flavio Cm, when they returned from visiting [facility]  Pt said that she was going to be honest & she got really upset at one point & said, "I've got to get the fuck out of here or I'm going to loose my mind"  Pt said that she was able to go out side with Nneka Harrell & talk & cry & get a hold of her emotions  Pt said that Jevon Blanc, who worked with her at [facility] works at [facility], & she was able to come & talk to her & even showed her a secret hallway to her office  Pt said that she was scared of the people & CM reviewed that was the [facility] portion of the building & Pt said that it was better once she got on the TBI Unit  Nneka Harrell reviewed that Pt is still thinking that she wants to go home & live with her mom, but they did talk about that mom is saying she cannot do that  Nneka Harrell said that she plans to try to get in contact with mom so that Pt can talk to her directly & she can tell her that Pt cannot come home  Pt agreeable with this plan & began to eat her lunch which had been held for her  CM spoke more with Litzy Castillo off the unit & Nneka Harrell said that she may go to visit Pt's mom at [facility] to speak with her & maybe assist with having her call Pt  Flavio Cm said that [facility] seemed to think that Pt would do well there, as long as she knows she cannot return to live with mom  CM met with Pt again, who said that the more she was thinking about it, she thinks she would like to go there, however, she does want to talk to her mom first   CM said that they were working to get in contact with [place] so Pt could talk to her  Pt smiling & in a pleasant mood

## 2019-08-30 NOTE — PLAN OF CARE
Problem: Ineffective Coping  Goal: Identifies ineffective coping skills  Outcome: Progressing  Goal: Identifies healthy coping skills  Outcome: Progressing  Goal: Demonstrates healthy coping skills  Outcome: Progressing  Goal: Participates in unit activities  Description  Interventions:  - Provide therapeutic environment   - Provide required programming   - Redirect inappropriate behaviors   Outcome: Progressing  Goal: Patient/Family participate in treatment and DC plans  Description  Interventions:  - Provide therapeutic environment  Outcome: Progressing  Goal: Patient/Family verbalizes awareness of resources  Outcome: Progressing     Problem: Depression  Goal: Treatment Goal: Demonstrate behavioral control of depressive symptoms, verbalize feelings of improved mood/affect, and adopt new coping skills prior to discharge  Outcome: Progressing  Goal: Verbalize thoughts and feelings  Description  Interventions:  - Assess and re-assess patient's level of risk   - Engage patient in 1:1 interactions, daily, for a minimum of 15 minutes   - Encourage patient to express feelings, fears, frustrations, hopes   Outcome: Progressing  Goal: Refrain from harming self  Description  Interventions:  - Monitor patient closely, per order   - Supervise medication ingestion, monitor effects and side effects   Outcome: Progressing  Goal: Refrain from isolation  Description  Interventions:  - Develop a trusting relationship   - Encourage socialization   Outcome: Progressing  Goal: Refrain from self-neglect  Outcome: Progressing  Goal: Attend and participate in unit activities, including therapeutic, recreational, and educational groups  Description  Interventions:  - Provide therapeutic and educational activities daily, encourage attendance and participation, and document same in the medical record   Outcome: Progressing  Goal: Complete daily ADLs, including personal hygiene independently, as able  Description  Interventions:  - Observe, teach, and assist patient with ADLS  -  Monitor and promote a balance of rest/activity, with adequate nutrition and elimination   Outcome: Progressing     Problem: DISCHARGE PLANNING  Goal: Discharge to home or other facility with appropriate resources  Description  INTERVENTIONS:  - Identify barriers to discharge w/patient and caregiver  - Arrange for needed discharge resources and transportation as appropriate  - Identify discharge learning needs (meds, wound care, etc )  - Arrange for interpretive services to assist at discharge as needed  - Refer to Case Management Department for coordinating discharge planning if the patient needs post-hospital services based on physician/advanced practitioner order or complex needs related to functional status, cognitive ability, or social support system  Outcome: Progressing     Problem: Risk for Violence/Aggression Toward Others  Goal: Treatment Goal: Refrain from acts of violence/aggression during length of stay, and demonstrate improved impulse control at the time of discharge  Outcome: Progressing  Goal: Refrain from harming others  Outcome: Progressing     Problem: Nutrition/Hydration-ADULT  Goal: Nutrient/Hydration intake appropriate for improving, restoring or maintaining nutritional needs  Description  Monitor and assess patient's nutrition/hydration status for malnutrition  Collaborate with interdisciplinary team and initiate plan and interventions as ordered  Monitor patient's weight and dietary intake as ordered or per policy  Utilize nutrition screening tool and intervene as necessary  Determine patient's food preferences and provide high-protein, high-caloric foods as appropriate       INTERVENTIONS:  - Monitor oral intake, urinary output, labs, and treatment plans  - Assess nutrition and hydration status and recommend course of action  - Evaluate amount of meals eaten  - Assist patient with eating if necessary   - Allow adequate time for meals  - Recommend/ encourage appropriate diets, oral nutritional supplements, and vitamin/mineral supplements  - Order, calculate, and assess calorie counts as needed  - Recommend, monitor, and adjust tube feedings and TPN/PPN based on assessed needs  - Assess need for intravenous fluids  - Provide specific nutrition/hydration education as appropriate  - Include patient/family/caregiver in decisions related to nutrition  Outcome: Progressing

## 2019-08-30 NOTE — PROGRESS NOTES
Status: Pt said that her visit to [facility] was scary  No issues overnight  Medication: No changes  D/C: TBD - [facility] feels Pt is appropriate for their TBI Unit, however, Pt wants to speak to her mom first to confirm she cannot return home        08/30/19 7493   Team Meeting   Meeting Type Daily Rounds   Team Members Present   Team Members Present Physician;Nurse;;Occupational Therapist   Physician Team Member Dr Yvonne Rivera / Neelima Trevino Team Member Stevenson Luis / Prisca Camarillo Management Team Member [id number] / [place]   OT Team Member Gordon   Patient/Family Present   Patient Present No   Patient's Family Present No

## 2019-08-31 PROCEDURE — 99232 SBSQ HOSP IP/OBS MODERATE 35: CPT | Performed by: PSYCHIATRY & NEUROLOGY

## 2019-08-31 RX ADMIN — RISPERIDONE 3 MG: 2 TABLET ORAL at 21:03

## 2019-08-31 RX ADMIN — FAMOTIDINE 20 MG: 20 TABLET ORAL at 17:34

## 2019-08-31 RX ADMIN — LORATADINE 10 MG: 10 TABLET ORAL at 09:53

## 2019-08-31 RX ADMIN — FAMOTIDINE 20 MG: 20 TABLET ORAL at 09:53

## 2019-08-31 RX ADMIN — DIVALPROEX SODIUM 750 MG: 500 TABLET, FILM COATED, EXTENDED RELEASE ORAL at 21:03

## 2019-08-31 RX ADMIN — BENZTROPINE MESYLATE 1 MG: 1 TABLET ORAL at 17:34

## 2019-08-31 RX ADMIN — BENZTROPINE MESYLATE 1 MG: 1 TABLET ORAL at 09:53

## 2019-08-31 NOTE — PROGRESS NOTES
Daily Rounds: Pt dealing with placement outside of home, seclusive and sad but OOR more last evening  Skipping some meals but intake adequate  Awaiting placement  Continue to offer emotional support during process

## 2019-08-31 NOTE — PROGRESS NOTES
Pt ate dinner and was OOB in day room most of the evening  Brighter affect  States she is feeling okay this evening

## 2019-08-31 NOTE — PROGRESS NOTES
Progress Note - [id number] [address] Jenny 30 y o  female MRN: [id number]  Unit/Bed#: [facility] 255-02 Encounter: [id number]    Assessment/Plan   Principal Problem:    Bipolar 1 disorder, depressed, severe ([facility]  Active Problems:    RADHA (generalized anxiety disorder)    TBI (traumatic brain injury) ([facility] 75 )    Autism spectrum    Tourette's    Subjective:  Patient is compliant with medication with no acute side effects  Patient is showing slow improvement in mood and anxiety and currently denies endorsing suicidal or homicidal ideations  Patient is seclusive to self during morning time but is coming out more during evening time  She is consenting for safety on the unit  Agreed with plan of not titrating medication further and awaiting for disposition planning  Current Medications:    Current Facility-Administered Medications:  acetaminophen 325 mg Oral Q6H PRN Dashawn Abreu, PA-C   acetaminophen 975 mg Oral Q6H PRN Dashawn Abreu, PA-C   benztropine 1 mg Intramuscular Q6H PRN Dashawn Abreu, PA-C   benztropine 1 mg Oral Q6H PRN Dashawn Abreu, PA-C   benztropine 1 mg Oral BID NADINE Funez-C   divalproex sodium 750 mg Oral HS Sebastian Vaughn MD   famotidine 20 mg Oral BID Gutierrez Hurt III, DO   ibuprofen 600 mg Oral Q8H PRN Dashawn Abreu, PA-C   loratadine 10 mg Oral Daily Dashawn Abreu, PA-C   LORazepam 2 mg Intramuscular Q6H PRN Dashawn Abreu, PA-C   LORazepam 1 mg Oral Q6H PRN Dashawn Abreu, PA-C   magnesium hydroxide 30 mL Oral Daily PRN Dashawn Abreu, PA-C   OLANZapine 10 mg Intramuscular Q8H PRN Dashawn Abreu, PA-C   OLANZapine 10 mg Oral Q8H PRN Dashawn Abreu, PA-C   ondansetron 4 mg Oral Q6H PRN Yoni Grant   risperiDONE 1 mg Oral Q3H PRN Dashawn Abreu, PA-C   risperiDONE 3 mg Oral HS Lizet Porter MD   traZODone 50 mg Oral HS PRN Dashawn Abreu, PA-C       Behavioral Health Medications: all current active meds have been reviewed      Vital signs in last 24 hours:  Temp:  [97 5 °F (36 4 °C)-97 8 °F (36 6 °C)] 97 5 °F (36 4 °C)  HR:  [76-86] 76  Resp:  [16] 16  BP: (108-113)/(62-74) 113/74    Laboratory results:    I have personally reviewed all pertinent laboratory/tests results  Labs in last 72 hours: No results for input(s): WBC, RBC, HGB, HCT, PLT, RDW, NEUTROABS, SODIUM, K, CL, CO2, BUN, CREATININE, GLUCOSE, GLUC, GLUF, CALCIUM, AST, ALT, ALKPHOS, TP, ALB, TBILI, CHOLESTEROL, HDL, TRIG, LDLCALC, VALPROICTOT, CARBAMAZEPIN, LITHIUM, AMMONIA, [id number], FREET4, T3FREE, PREGTESTUR, PREGSERUM, HCG, HCGQUANT, RPR in the last 72 hours      Invalid input(s):  RBC  Admission Labs:   Admission on 08/14/2019   Component Date Value    Color, UA 08/15/2019 Orange     Clarity, UA 08/15/2019 Cloudy     Specific Gravity, UA 08/15/2019 >=1 030     pH, UA 08/15/2019 5 5     Leukocytes, UA 08/15/2019 Trace*    Nitrite, UA 08/15/2019 Negative     Protein, UA 08/15/2019 Trace*    Glucose, UA 08/15/2019 Negative     Ketones, UA 08/15/2019 Trace*    Urobilinogen, UA 08/15/2019 1 0     Bilirubin, UA 08/15/2019 Interference- unable to analyze*    Blood, UA 08/15/2019 Large*    RBC, UA 08/15/2019 Innumerable*    WBC, UA 08/15/2019 10-20*    Epithelial Cells 08/15/2019 Moderate*    Bacteria, UA 08/15/2019 Moderate*    MUCUS THREADS 08/15/2019 Occasional*    Urine Culture 08/15/2019 80,000-89,000 cfu/ml Beta Hemolytic Streptococcus Group G*    WBC 08/16/2019 6 53     RBC 08/16/2019 4 51     Hemoglobin 08/16/2019 14 6     Hematocrit 08/16/2019 44 1     MCV 08/16/2019 98     MCH 08/16/2019 32 4     MCHC 08/16/2019 33 1     RDW 08/16/2019 12 1     MPV 08/16/2019 8 8*    Platelets 06/70/6510 167     nRBC 08/16/2019 0     Neutrophils Relative 08/16/2019 82*    Immat GRANS % 08/16/2019 0     Lymphocytes Relative 08/16/2019 14     Monocytes Relative 08/16/2019 4     Eosinophils Relative 08/16/2019 0     Basophils Relative 08/16/2019 0     Neutrophils Absolute 08/16/2019 5 32     Immature Grans Absolute 08/16/2019 0 01     Lymphocytes Absolute 08/16/2019 0 92     Monocytes Absolute 08/16/2019 0 28     Eosinophils Absolute 08/16/2019 0 00     Basophils Absolute 08/16/2019 0 00     Sodium 08/16/2019 141     Potassium 08/16/2019 4 0     Chloride 08/16/2019 102     CO2 08/16/2019 25     ANION GAP 08/16/2019 14*    BUN 08/16/2019 14     Creatinine 08/16/2019 0 72     Glucose 08/16/2019 107     Calcium 08/16/2019 9 8     AST 08/16/2019 33     ALT 08/16/2019 36     Alkaline Phosphatase 08/16/2019 77     Total Protein 08/16/2019 7 7     Albumin 08/16/2019 4 1     Total Bilirubin 08/16/2019 0 30     eGFR 08/16/2019 116     Total CK 08/16/2019 281*    Ammonia 08/16/2019 <10*    CK-MB Index 08/16/2019 <1 0     CK-MB 08/16/2019 1 7     WBC 08/19/2019 3 71*    RBC 08/19/2019 4 65     Hemoglobin 08/19/2019 15 0     Hematocrit 08/19/2019 44 7     MCV 08/19/2019 96     MCH 08/19/2019 32 3     MCHC 08/19/2019 33 6     RDW 08/19/2019 12 0     MPV 08/19/2019 9 0     Platelets 42/46/1616 164     nRBC 08/19/2019 0     Neutrophils Relative 08/19/2019 51     Immat GRANS % 08/19/2019 0     Lymphocytes Relative 08/19/2019 40     Monocytes Relative 08/19/2019 9     Eosinophils Relative 08/19/2019 0     Basophils Relative 08/19/2019 0     Neutrophils Absolute 08/19/2019 1 90     Immature Grans Absolute 08/19/2019 0 01     Lymphocytes Absolute 08/19/2019 1 48     Monocytes Absolute 08/19/2019 0 32     Eosinophils Absolute 08/19/2019 0 00     Basophils Absolute 08/19/2019 0 00     Sodium 08/19/2019 139     Potassium 08/19/2019 3 7     Chloride 08/19/2019 101     CO2 08/19/2019 26     ANION GAP 08/19/2019 12     BUN 08/19/2019 12     Creatinine 08/19/2019 0 69     Glucose 08/19/2019 102     Glucose, Fasting 08/19/2019 102*    Calcium 08/19/2019 9 7     AST 08/19/2019 39     ALT 08/19/2019 58     Alkaline Phosphatase 08/19/2019 74  Total Protein 08/19/2019 7 2     Albumin 08/19/2019 3 9     Total Bilirubin 08/19/2019 0 40     eGFR 08/19/2019 121     Preg, Serum 08/19/2019 Negative     TSH 3RD GENERATON 08/19/2019 2 755     Valproic Acid, Total 08/19/2019 101*    WBC 08/22/2019 5 85     RBC 08/22/2019 4 48     Hemoglobin 08/22/2019 14 6     Hematocrit 08/22/2019 44 0     MCV 08/22/2019 98     MCH 08/22/2019 32 6     MCHC 08/22/2019 33 2     RDW 08/22/2019 11 9     MPV 08/22/2019 9 1     Platelets 09/60/9967 177     nRBC 08/22/2019 0     Neutrophils Relative 08/22/2019 63     Immat GRANS % 08/22/2019 0     Lymphocytes Relative 08/22/2019 29     Monocytes Relative 08/22/2019 8     Eosinophils Relative 08/22/2019 0     Basophils Relative 08/22/2019 0     Neutrophils Absolute 08/22/2019 3 66     Immature Grans Absolute 08/22/2019 0 02     Lymphocytes Absolute 08/22/2019 1 72     Monocytes Absolute 08/22/2019 0 44     Eosinophils Absolute 08/22/2019 0 00     Basophils Absolute 08/22/2019 0 01     Valproic Acid, Total 08/22/2019 63        Psychiatric Review of Systems:  Behavior over the last 24 hours:  improved  Sleep: normal  Appetite: normal  Medication side effects: No  ROS: no complaints    Mental Status Evaluation:  Appearance:  age appropriate   Behavior:  guarded   Speech:  soft   Mood:  anxious   Affect:  constricted   Language naming objects   Thought Process:  circumstantial   Thought Content:  obsessions   Perceptual Disturbances: None   Risk Potential: Suicidal Ideations none, Homicidal Ideations none and Potential for Aggression No   Sensorium:  person, place and time/date   Cognition:  grossly intact   Consciousness:  awake    Attention: attention span and concentration were age appropriate   Insight:  fair   Judgment: fair   Intellect fair   Gait/Station: In bed   Motor Activity: no abnormal movements     Memory: Short and long term memory  fair     Progress Toward Goals: slow progress    Recommended Treatment:   Disposition planning is pending  Continue with group therapy, milieu therapy and occupational therapy  Continue following current medications:   Current Facility-Administered Medications:  acetaminophen 325 mg Oral Q6H PRN Taj Starcher, PA-C   acetaminophen 975 mg Oral Q6H PRN Taj Starcher, PA-C   benztropine 1 mg Intramuscular Q6H PRN Taj Starcher, PA-C   benztropine 1 mg Oral Q6H PRN Taj Starcher, PA-C   benztropine 1 mg Oral BID Taj Starcher, PA-C   divalproex sodium 750 mg Oral HS Letty Santana MD   famotidine 20 mg Oral BID Dyfabián Booty III, DO   ibuprofen 600 mg Oral Q8H PRN Taj Starcher, PA-C   loratadine 10 mg Oral Daily Taj Starcher, PA-C   LORazepam 2 mg Intramuscular Q6H PRN Taj Starcher, PA-C   LORazepam 1 mg Oral Q6H PRN Taj Starcher, PA-C   magnesium hydroxide 30 mL Oral Daily PRN Taj Starcher, PA-C   OLANZapine 10 mg Intramuscular Q8H PRN Taj Starcher, PA-C   OLANZapine 10 mg Oral Q8H PRN Taj Starcher, PA-C   ondansetron 4 mg Oral Q6H PRN Yoni Grant   risperiDONE 1 mg Oral Q3H PRN Taj Starcher, PA-C   risperiDONE 3 mg Oral HS Amina Madrigal MD   traZODone 50 mg Oral HS PRN Taj Starcher, PA-C       Risks, benefits and possible side effects of Medications:   Risks, benefits, and possible side effects of medications explained to patient and patient verbalizes understanding  Risks of medications in pregnancy explained if female patient  Patient verbalizes understanding and agrees to notify her doctor if she becomes pregnant  This note has been constructed using a voice recognition system  There may be translation, syntax,  or grammatical errors  If you have any questions, please contact the dictating provider

## 2019-09-01 PROCEDURE — 99232 SBSQ HOSP IP/OBS MODERATE 35: CPT | Performed by: PSYCHIATRY & NEUROLOGY

## 2019-09-01 RX ORDER — LOPERAMIDE HYDROCHLORIDE 2 MG/1
2 CAPSULE ORAL EVERY 4 HOURS PRN
Status: DISCONTINUED | OUTPATIENT
Start: 2019-09-01 | End: 2019-09-04 | Stop reason: HOSPADM

## 2019-09-01 RX ADMIN — FAMOTIDINE 20 MG: 20 TABLET ORAL at 09:00

## 2019-09-01 RX ADMIN — LORATADINE 10 MG: 10 TABLET ORAL at 09:00

## 2019-09-01 RX ADMIN — DIVALPROEX SODIUM 750 MG: 500 TABLET, FILM COATED, EXTENDED RELEASE ORAL at 21:37

## 2019-09-01 RX ADMIN — BENZTROPINE MESYLATE 1 MG: 1 TABLET ORAL at 17:01

## 2019-09-01 RX ADMIN — FAMOTIDINE 20 MG: 20 TABLET ORAL at 17:01

## 2019-09-01 RX ADMIN — BENZTROPINE MESYLATE 1 MG: 1 TABLET ORAL at 09:00

## 2019-09-01 RX ADMIN — LOPERAMIDE HYDROCHLORIDE 2 MG: 2 CAPSULE ORAL at 00:20

## 2019-09-01 RX ADMIN — RISPERIDONE 3 MG: 2 TABLET ORAL at 21:37

## 2019-09-01 NOTE — PROGRESS NOTES
Pt lying in bed  Calm, cooperative  Ate lunch and dinner today  No further episodes of diarrhea  Denied all symptoms

## 2019-09-01 NOTE — CASE MANAGEMENT
CM & Pt called into a conference call arranged by [facility] MH/DS ICM, Jessika Lopes @ [phone]  Pt's mom, Lissettsa Smoker, came onto the line & Pt was able to talk to her about how she has been doing  NIKITA Arevalo & her supervisor, Lester Humphrey, came on the line & all parties reviewed [facility], however, she was still hopeful she was going to be able to return to live with Lissettsa Smoker  Elsi explained to Pt that she herself was not doing well & had spent time in the hospital & at this time Pt needed to go live somewhere else  Pt tearful & saying she misses & loves her mom & wants to live with her  All assured Pt that Denisha Smoker still loves her & will support her, however, she just cannot care for her anymore  Pt stating she understands but it won't be the same  All parties reiterated to Pt that she cannot return home & she verbalized understanding  CM agreed she would assist Pt with calling Elsi on Tuesday afternoon  CM & Pt talked more in her room & she was tearful but able to talk about her emotions  Pt agreeable with moving forward with d/c to [facility]  CM returned a phone call to Adalid Alvarez of [facility] who reported that as long as Pt was in agreement, they could accept her on Weds or Thurs

## 2019-09-01 NOTE — PLAN OF CARE
Problem: Ineffective Coping  Goal: Identifies ineffective coping skills  Outcome: Progressing  Goal: Identifies healthy coping skills  Outcome: Progressing  Goal: Demonstrates healthy coping skills  Outcome: Progressing  Goal: Participates in unit activities  Description  Interventions:  - Provide therapeutic environment   - Provide required programming   - Redirect inappropriate behaviors   Outcome: Progressing  Goal: Patient/Family participate in treatment and DC plans  Description  Interventions:  - Provide therapeutic environment  Outcome: Progressing  Goal: Patient/Family verbalizes awareness of resources  Outcome: Progressing     Problem: Depression  Goal: Treatment Goal: Demonstrate behavioral control of depressive symptoms, verbalize feelings of improved mood/affect, and adopt new coping skills prior to discharge  Outcome: Progressing  Goal: Verbalize thoughts and feelings  Description  Interventions:  - Assess and re-assess patient's level of risk   - Engage patient in 1:1 interactions, daily, for a minimum of 15 minutes   - Encourage patient to express feelings, fears, frustrations, hopes   Outcome: Progressing  Goal: Refrain from harming self  Description  Interventions:  - Monitor patient closely, per order   - Supervise medication ingestion, monitor effects and side effects   Outcome: Progressing  Goal: Refrain from self-neglect  Outcome: Progressing  Goal: Complete daily ADLs, including personal hygiene independently, as able  Description  Interventions:  - Observe, teach, and assist patient with ADLS  -  Monitor and promote a balance of rest/activity, with adequate nutrition and elimination   Outcome: Progressing     Problem: DISCHARGE PLANNING  Goal: Discharge to home or other facility with appropriate resources  Description  INTERVENTIONS:  - Identify barriers to discharge w/patient and caregiver  - Arrange for needed discharge resources and transportation as appropriate  - Identify discharge learning needs (meds, wound care, etc )  - Arrange for interpretive services to assist at discharge as needed  - Refer to Case Management Department for coordinating discharge planning if the patient needs post-hospital services based on physician/advanced practitioner order or complex needs related to functional status, cognitive ability, or social support system  Outcome: Progressing     Problem: Risk for Violence/Aggression Toward Others  Goal: Treatment Goal: Refrain from acts of violence/aggression during length of stay, and demonstrate improved impulse control at the time of discharge  Outcome: Progressing  Goal: Refrain from harming others  Outcome: Progressing     Problem: Nutrition/Hydration-ADULT  Goal: Nutrient/Hydration intake appropriate for improving, restoring or maintaining nutritional needs  Description  Monitor and assess patient's nutrition/hydration status for malnutrition  Collaborate with interdisciplinary team and initiate plan and interventions as ordered  Monitor patient's weight and dietary intake as ordered or per policy  Utilize nutrition screening tool and intervene as necessary  Determine patient's food preferences and provide high-protein, high-caloric foods as appropriate       INTERVENTIONS:  - Monitor oral intake, urinary output, labs, and treatment plans  - Assess nutrition and hydration status and recommend course of action  - Evaluate amount of meals eaten  - Assist patient with eating if necessary   - Allow adequate time for meals  - Recommend/ encourage appropriate diets, oral nutritional supplements, and vitamin/mineral supplements  - Order, calculate, and assess calorie counts as needed  - Recommend, monitor, and adjust tube feedings and TPN/PPN based on assessed needs  - Assess need for intravenous fluids  - Provide specific nutrition/hydration education as appropriate  - Include patient/family/caregiver in decisions related to nutrition  Outcome: Progressing     Problem: Depression  Goal: Treatment Goal: Demonstrate behavioral control of depressive symptoms, verbalize feelings of improved mood/affect, and adopt new coping skills prior to discharge  Outcome: Progressing     Problem: Depression  Goal: Refrain from isolation  Description  Interventions:  - Develop a trusting relationship   - Encourage socialization   Outcome: Not Progressing  Goal: Attend and participate in unit activities, including therapeutic, recreational, and educational groups  Description  Interventions:  - Provide therapeutic and educational activities daily, encourage attendance and participation, and document same in the medical record   Outcome: Not Progressing

## 2019-09-01 NOTE — PROGRESS NOTES
Progress Note - [id number] Hachiko V Jenny 30 y o  female MRN: [id number]  Unit/Bed#: [facility] 255-02 Encounter: [id number]    Assessment/Plan   Principal Problem:    Bipolar 1 disorder, depressed, severe ([facility] [address] )  Active Problems:    RADHA (generalized anxiety disorder)    TBI (traumatic brain injury) ([facility] [address] )    Autism spectrum    Tourette's    Subjective:  Patient is compliant with medication and reports slow improvement in mood and anxiety  Does report feeling depressed secondary to her and the hospital but she understand the treatment planning  She had an episode of diarrhea overnight affecting her sleep but describes Imodium as helpful  She continues to deny endorsing suicidal or homicidal ideation and is consenting for safety on the unit  Agreed with plan of not titrating medication further and awaiting for disposition planning      Current Medications:    Current Facility-Administered Medications:  acetaminophen 325 mg Oral Q6H PRN Rosevelt Matthew, PA-C   acetaminophen 975 mg Oral Q6H PRN Rosevelt Matthew, PA-C   benztropine 1 mg Intramuscular Q6H PRN Rosevelt Matthew, PA-C   benztropine 1 mg Oral Q6H PRN Rosevelt Matthew, PA-C   benztropine 1 mg Oral BID Rosevelt Matthew, PA-C   divalproex sodium 750 mg Oral HS Ricky Patino MD   famotidine 20 mg Oral BID Evelene Maxim III, DO   ibuprofen 600 mg Oral Q8H PRN Rosevelt Matthew, PA-C   loperamide 2 mg Oral Q4H PRN Tiana Clock, CRNP   loratadine 10 mg Oral Daily Rosevelt Matthew, PA-C   LORazepam 2 mg Intramuscular Q6H PRN Rosevelt Matthew, PA-C   LORazepam 1 mg Oral Q6H PRN Rosevelt Matthew, PA-C   magnesium hydroxide 30 mL Oral Daily PRN Rosevelt Matthew, PA-C   OLANZapine 10 mg Intramuscular Q8H PRN Rosevelt Matthew, PA-C   OLANZapine 10 mg Oral Q8H PRN Rosevelt Matthew, PA-C   ondansetron 4 mg Oral Q6H PRN Yoni Grant   risperiDONE 1 mg Oral Q3H PRN Rosevelt Matthew, PA-C   risperiDONE 3 mg Oral HS Ace Joy MD   traZODone 50 mg Oral HS PRN Taj Cr PA-C       Behavioral Health Medications: all current active meds have been reviewed  Vital signs in last 24 hours:  Temp:  [96 6 °F (35 9 °C)-97 3 °F (36 3 °C)] 96 6 °F (35 9 °C)  HR:  [] 83  Resp:  [16-18] 18  BP: (104-109)/(56-61) 104/56    Laboratory results:    I have personally reviewed all pertinent laboratory/tests results  Labs in last 72 hours: No results for input(s): WBC, RBC, HGB, HCT, PLT, RDW, NEUTROABS, SODIUM, K, CL, CO2, BUN, CREATININE, GLUCOSE, GLUC, GLUF, CALCIUM, AST, ALT, ALKPHOS, TP, ALB, TBILI, CHOLESTEROL, HDL, TRIG, LDLCALC, VALPROICTOT, CARBAMAZEPIN, LITHIUM, AMMONIA, [id number], FREET4, T3FREE, PREGTESTUR, PREGSERUM, HCG, HCGQUANT, RPR in the last 72 hours      Invalid input(s):  RBC  Admission Labs:   Admission on 08/14/2019   Component Date Value    Color, UA 08/15/2019 Orange     Clarity, UA 08/15/2019 Cloudy     Specific Gravity, UA 08/15/2019 >=1 030     pH, UA 08/15/2019 5 5     Leukocytes, UA 08/15/2019 Trace*    Nitrite, UA 08/15/2019 Negative     Protein, UA 08/15/2019 Trace*    Glucose, UA 08/15/2019 Negative     Ketones, UA 08/15/2019 Trace*    Urobilinogen, UA 08/15/2019 1 0     Bilirubin, UA 08/15/2019 Interference- unable to analyze*    Blood, UA 08/15/2019 Large*    RBC, UA 08/15/2019 Innumerable*    WBC, UA 08/15/2019 10-20*    Epithelial Cells 08/15/2019 Moderate*    Bacteria, UA 08/15/2019 Moderate*    MUCUS THREADS 08/15/2019 Occasional*    Urine Culture 08/15/2019 80,000-89,000 cfu/ml Beta Hemolytic Streptococcus Group G*    WBC 08/16/2019 6 53     RBC 08/16/2019 4 51     Hemoglobin 08/16/2019 14 6     Hematocrit 08/16/2019 44 1     MCV 08/16/2019 98     MCH 08/16/2019 32 4     MCHC 08/16/2019 33 1     RDW 08/16/2019 12 1     MPV 08/16/2019 8 8*    Platelets 15/23/4134 167     nRBC 08/16/2019 0     Neutrophils Relative 08/16/2019 82*    Immat GRANS % 08/16/2019 0     Lymphocytes Relative 08/16/2019 14     Monocytes Relative 08/16/2019 4     Eosinophils Relative 08/16/2019 0     Basophils Relative 08/16/2019 0     Neutrophils Absolute 08/16/2019 5 32     Immature Grans Absolute 08/16/2019 0 01     Lymphocytes Absolute 08/16/2019 0 92     Monocytes Absolute 08/16/2019 0 28     Eosinophils Absolute 08/16/2019 0 00     Basophils Absolute 08/16/2019 0 00     Sodium 08/16/2019 141     Potassium 08/16/2019 4 0     Chloride 08/16/2019 102     CO2 08/16/2019 25     ANION GAP 08/16/2019 14*    BUN 08/16/2019 14     Creatinine 08/16/2019 0 72     Glucose 08/16/2019 107     Calcium 08/16/2019 9 8     AST 08/16/2019 33     ALT 08/16/2019 36     Alkaline Phosphatase 08/16/2019 77     Total Protein 08/16/2019 7 7     Albumin 08/16/2019 4 1     Total Bilirubin 08/16/2019 0 30     eGFR 08/16/2019 116     Total CK 08/16/2019 281*    Ammonia 08/16/2019 <10*    CK-MB Index 08/16/2019 <1 0     CK-MB 08/16/2019 1 7     WBC 08/19/2019 3 71*    RBC 08/19/2019 4 65     Hemoglobin 08/19/2019 15 0     Hematocrit 08/19/2019 44 7     MCV 08/19/2019 96     MCH 08/19/2019 32 3     MCHC 08/19/2019 33 6     RDW 08/19/2019 12 0     MPV 08/19/2019 9 0     Platelets 23/25/6556 164     nRBC 08/19/2019 0     Neutrophils Relative 08/19/2019 51     Immat GRANS % 08/19/2019 0     Lymphocytes Relative 08/19/2019 40     Monocytes Relative 08/19/2019 9     Eosinophils Relative 08/19/2019 0     Basophils Relative 08/19/2019 0     Neutrophils Absolute 08/19/2019 1 90     Immature Grans Absolute 08/19/2019 0 01     Lymphocytes Absolute 08/19/2019 1 48     Monocytes Absolute 08/19/2019 0 32     Eosinophils Absolute 08/19/2019 0 00     Basophils Absolute 08/19/2019 0 00     Sodium 08/19/2019 139     Potassium 08/19/2019 3 7     Chloride 08/19/2019 101     CO2 08/19/2019 26     ANION GAP 08/19/2019 12     BUN 08/19/2019 12     Creatinine 08/19/2019 0 69     Glucose 08/19/2019 102     Glucose, Fasting 08/19/2019 102*    Calcium 08/19/2019 9 7     AST 08/19/2019 39     ALT 08/19/2019 58     Alkaline Phosphatase 08/19/2019 74     Total Protein 08/19/2019 7 2     Albumin 08/19/2019 3 9     Total Bilirubin 08/19/2019 0 40     eGFR 08/19/2019 121     Preg, Serum 08/19/2019 Negative     TSH 3RD GENERATON 08/19/2019 2 755     Valproic Acid, Total 08/19/2019 101*    WBC 08/22/2019 5 85     RBC 08/22/2019 4 48     Hemoglobin 08/22/2019 14 6     Hematocrit 08/22/2019 44 0     MCV 08/22/2019 98     MCH 08/22/2019 32 6     MCHC 08/22/2019 33 2     RDW 08/22/2019 11 9     MPV 08/22/2019 9 1     Platelets 32/09/2867 177     nRBC 08/22/2019 0     Neutrophils Relative 08/22/2019 63     Immat GRANS % 08/22/2019 0     Lymphocytes Relative 08/22/2019 29     Monocytes Relative 08/22/2019 8     Eosinophils Relative 08/22/2019 0     Basophils Relative 08/22/2019 0     Neutrophils Absolute 08/22/2019 3 66     Immature Grans Absolute 08/22/2019 0 02     Lymphocytes Absolute 08/22/2019 1 72     Monocytes Absolute 08/22/2019 0 44     Eosinophils Absolute 08/22/2019 0 00     Basophils Absolute 08/22/2019 0 01     Valproic Acid, Total 08/22/2019 63        Psychiatric Review of Systems:  Behavior over the last 24 hours:  improving  Sleep: normal  Appetite: normal  Medication side effects: No  ROS: diarrhea (improving with imodium)    Mental Status Evaluation:  Appearance:  casually dressed   Behavior:  guarded   Speech:  soft   Mood:  anxious   Affect:  constricted   Language naming objects   Thought Process:  circumstantial   Thought Content:  obsessions   Perceptual Disturbances: None   Risk Potential: Suicidal Ideations none, Homicidal Ideations none and Potential for Aggression No   Sensorium:  person and place   Cognition:  grossly intact   Consciousness:  awake    Attention: attention span and concentration were age appropriate   Insight:  fair   Judgment: fair   Intellect limited Gait/Station: In bed sleeping   Motor Activity: no abnormal movements     Memory: Short and long term memory  fair     Progress Toward Goals: slow progress    Recommended Treatment:   Disposition planning is pending  Continue with group therapy, milieu therapy and occupational therapy  Continue following current medications:   Current Facility-Administered Medications:  acetaminophen 325 mg Oral Q6H PRN Sandoval [place], PA-C   acetaminophen 975 mg Oral Q6H PRN Sandoval [place], PA-C   benztropine 1 mg Intramuscular Q6H PRN Sandoval [place], PA-C   benztropine 1 mg Oral Q6H PRN Sandoval Marily, PA-C   benztropine 1 mg Oral BID Sandoval Marily, PA-C   divalproex sodium 750 mg Oral HS Severo Grime, MD   famotidine 20 mg Oral BID Khadijah Marcelino III, DO   ibuprofen 600 mg Oral Q8H PRN Sandoval [place], PA-C   loperamide 2 mg Oral Q4H PRN CHRISTIANNE De La O   loratadine 10 mg Oral Daily Sandoval Marily, PA-C   LORazepam 2 mg Intramuscular Q6H PRN Sandoval Marily, PA-C   LORazepam 1 mg Oral Q6H PRN Sandoval Marily, PA-C   magnesium hydroxide 30 mL Oral Daily PRN Sandoval Marily, PA-C   OLANZapine 10 mg Intramuscular Q8H PRN Sandoval [place], PA-C   OLANZapine 10 mg Oral Q8H PRN Sandoval [place], PA-C   ondansetron 4 mg Oral Q6H PRN Yoni Grant   risperiDONE 1 mg Oral Q3H PRN Snadoval Marily, PA-C   risperiDONE 3 mg Oral HS Simona Holman MD   traZODone 50 mg Oral HS PRN Sandoval Marily, PA-C       Risks, benefits and possible side effects of Medications:   Risks, benefits, and possible side effects of medications explained to patient and patient verbalizes understanding  Risks of medications in pregnancy explained if female patient  Patient verbalizes understanding and agrees to notify her doctor if she becomes pregnant  This note has been constructed using a voice recognition system  There may be translation, syntax,  or grammatical errors   If you have any questions, please contact the dictating provider

## 2019-09-01 NOTE — PROGRESS NOTES
Seclusive to room, lying in bed, refused breakfast   Pt is drowsy but easliy awakened by knock on door  Medication compliant  Pt reports not sleeping well last night d/t a stomach ache and several bouts of loose stools  Pt states that she is feeling tired today and plans to nap  Denies SI/HI/AVH

## 2019-09-01 NOTE — PROGRESS NOTES
Pt remains seclusive to room this evening, only coming out of room for dinner  Pt declined snack stating she was not hungry  Encouraged fluids  Pt denies SI/HI, Anxiety and depression  Scant in conversation however cooperative  Declined group stating she wanted to rest in bed  Reviewed with pt importance of ambulation/daily exercise throughout the day and benefits to attending groups however pt continues to decline at this time

## 2019-09-01 NOTE — PROGRESS NOTES
Daily Rounds:    Pt sick during night with diarrhea, immodium ordered  Pt remains depressed and anxious re: disposition  Awaiting placement  No agitation or outbursts

## 2019-09-01 NOTE — PROGRESS NOTES
Pt awake majority of the night  Had multiple episodes of loose stool this evening and NP was called  PRN medications ordered and pt received  Remained awake remainder of shift, sitting up in bed  Denies concerns  Denies anxiety and depression however appears anxious

## 2019-09-02 PROCEDURE — 99232 SBSQ HOSP IP/OBS MODERATE 35: CPT | Performed by: PSYCHIATRY & NEUROLOGY

## 2019-09-02 RX ADMIN — RISPERIDONE 3 MG: 2 TABLET ORAL at 21:13

## 2019-09-02 RX ADMIN — LORATADINE 10 MG: 10 TABLET ORAL at 09:14

## 2019-09-02 RX ADMIN — FAMOTIDINE 20 MG: 20 TABLET ORAL at 17:38

## 2019-09-02 RX ADMIN — FAMOTIDINE 20 MG: 20 TABLET ORAL at 09:14

## 2019-09-02 RX ADMIN — DIVALPROEX SODIUM 750 MG: 500 TABLET, FILM COATED, EXTENDED RELEASE ORAL at 21:13

## 2019-09-02 RX ADMIN — BENZTROPINE MESYLATE 1 MG: 1 TABLET ORAL at 17:38

## 2019-09-02 RX ADMIN — BENZTROPINE MESYLATE 1 MG: 1 TABLET ORAL at 09:14

## 2019-09-02 NOTE — PROGRESS NOTES
Pt denies anxiety or depression  She denies passive death wish, S/H/I or AVH  She has contracted for safety  Pt states sleep and appetite are good  She has been compliant with meds and attended and participated group  Will continue to monitor, provide support and encouragement

## 2019-09-02 NOTE — PROGRESS NOTES
Progress Note - [id number] Kiptronic V Jenny 30 y o  female MRN: [id number]  Unit/Bed#: [facility] 255-02 Encounter: [id number]    Assessment/Plan   Principal Problem:    Bipolar 1 disorder, depressed, severe ([facility] [address] )  Active Problems:    RADHA (generalized anxiety disorder)    TBI (traumatic [facility]) [facility] )    Autism spectrum    Tourette's    Subjective:  Patient is compliant with medication with no acute side effects  Today patient was seen out in the milieu attending groups  Slow improvement in affect noted  She does report feeling depressed secondary to long-term hospitalization  She is denying suicidal or homicidal ideations  No additional p r n  Medication needed and patient is consenting for safety on the unit  Agreed with plan of not titrating medications further and awaiting for disposition planning      Current Medications:    Current Facility-Administered Medications:  acetaminophen 325 mg Oral Q6H PRN Jorene Carpen, PA-C   acetaminophen 975 mg Oral Q6H PRN Jorene Carpen, PA-C   benztropine 1 mg Intramuscular Q6H PRN Jorene Carpen, PA-C   benztropine 1 mg Oral Q6H PRN Jorene Carpen, PA-C   benztropine 1 mg Oral BID Jorene Carpen, PA-C   divalproex sodium 750 mg Oral HS Valeria Castellanos MD   famotidine 20 mg Oral BID Catherine Eye III, DO   ibuprofen 600 mg Oral Q8H PRN Jorene Carpen, PA-C   loperamide 2 mg Oral Q4H PRN CHRISTIANNE Powell   loratadine 10 mg Oral Daily Jorene Carpen, PA-C   LORazepam 2 mg Intramuscular Q6H PRN Jorene Carpen, PA-C   LORazepam 1 mg Oral Q6H PRN Jorene Carpen, PA-C   magnesium hydroxide 30 mL Oral Daily PRN Jorene Carpen, PA-C   OLANZapine 10 mg Intramuscular Q8H PRN Jorene Carpen, PA-C   OLANZapine 10 mg Oral Q8H PRN Jorene Carpen, PA-C   ondansetron 4 mg Oral Q6H PRN Yoni Grant   risperiDONE 1 mg Oral Q3H PRN Jorene Carpen, PA-C   risperiDONE 3 mg Oral HS Elbert Garay MD   traZODone 50 mg Oral HS PRN Rosealee Dubin Hai Schofield PA-C       Behavioral Health Medications: all current active meds have been reviewed  Vital signs in last 24 hours:  Temp:  [96 5 °F (35 8 °C)-96 8 °F (36 °C)] 96 8 °F (36 °C)  HR:  [] 75  Resp:  [18] 18  BP: (107-112)/(61-64) 107/61    Laboratory results:    I have personally reviewed all pertinent laboratory/tests results  Labs in last 72 hours: No results for input(s): WBC, RBC, HGB, HCT, PLT, RDW, NEUTROABS, SODIUM, K, CL, CO2, BUN, CREATININE, GLUCOSE, GLUC, GLUF, CALCIUM, AST, ALT, ALKPHOS, TP, ALB, TBILI, CHOLESTEROL, HDL, TRIG, LDLCALC, VALPROICTOT, CARBAMAZEPIN, LITHIUM, AMMONIA, [id number], FREET4, T3FREE, PREGTESTUR, PREGSERUM, HCG, HCGQUANT, RPR in the last 72 hours      Invalid input(s):  RBC  Admission Labs:   Admission on 08/14/2019   Component Date Value    Color, UA 08/15/2019 Orange     Clarity, UA 08/15/2019 Cloudy     Specific Gravity, UA 08/15/2019 >=1 030     pH, UA 08/15/2019 5 5     Leukocytes, UA 08/15/2019 Trace*    Nitrite, UA 08/15/2019 Negative     Protein, UA 08/15/2019 Trace*    Glucose, UA 08/15/2019 Negative     Ketones, UA 08/15/2019 Trace*    Urobilinogen, UA 08/15/2019 1 0     Bilirubin, UA 08/15/2019 Interference- unable to analyze*    Blood, UA 08/15/2019 Large*    RBC, UA 08/15/2019 Innumerable*    WBC, UA 08/15/2019 10-20*    Epithelial Cells 08/15/2019 Moderate*    Bacteria, UA 08/15/2019 Moderate*    MUCUS THREADS 08/15/2019 Occasional*    Urine Culture 08/15/2019 80,000-89,000 cfu/ml Beta Hemolytic Streptococcus Group G*    WBC 08/16/2019 6 53     RBC 08/16/2019 4 51     Hemoglobin 08/16/2019 14 6     Hematocrit 08/16/2019 44 1     MCV 08/16/2019 98     MCH 08/16/2019 32 4     MCHC 08/16/2019 33 1     RDW 08/16/2019 12 1     MPV 08/16/2019 8 8*    Platelets 80/66/8396 167     nRBC 08/16/2019 0     Neutrophils Relative 08/16/2019 82*    Immat GRANS % 08/16/2019 0     Lymphocytes Relative 08/16/2019 14     Monocytes Relative 08/16/2019 4     Eosinophils Relative 08/16/2019 0     Basophils Relative 08/16/2019 0     Neutrophils Absolute 08/16/2019 5 32     Immature Grans Absolute 08/16/2019 0 01     Lymphocytes Absolute 08/16/2019 0 92     Monocytes Absolute 08/16/2019 0 28     Eosinophils Absolute 08/16/2019 0 00     Basophils Absolute 08/16/2019 0 00     Sodium 08/16/2019 141     Potassium 08/16/2019 4 0     Chloride 08/16/2019 102     CO2 08/16/2019 25     ANION GAP 08/16/2019 14*    BUN 08/16/2019 14     Creatinine 08/16/2019 0 72     Glucose 08/16/2019 107     Calcium 08/16/2019 9 8     AST 08/16/2019 33     ALT 08/16/2019 36     Alkaline Phosphatase 08/16/2019 77     Total Protein 08/16/2019 7 7     Albumin 08/16/2019 4 1     Total Bilirubin 08/16/2019 0 30     eGFR 08/16/2019 116     Total CK 08/16/2019 281*    Ammonia 08/16/2019 <10*    CK-MB Index 08/16/2019 <1 0     CK-MB 08/16/2019 1 7     WBC 08/19/2019 3 71*    RBC 08/19/2019 4 65     Hemoglobin 08/19/2019 15 0     Hematocrit 08/19/2019 44 7     MCV 08/19/2019 96     MCH 08/19/2019 32 3     MCHC 08/19/2019 33 6     RDW 08/19/2019 12 0     MPV 08/19/2019 9 0     Platelets 98/53/0650 164     nRBC 08/19/2019 0     Neutrophils Relative 08/19/2019 51     Immat GRANS % 08/19/2019 0     Lymphocytes Relative 08/19/2019 40     Monocytes Relative 08/19/2019 9     Eosinophils Relative 08/19/2019 0     Basophils Relative 08/19/2019 0     Neutrophils Absolute 08/19/2019 1 90     Immature Grans Absolute 08/19/2019 0 01     Lymphocytes Absolute 08/19/2019 1 48     Monocytes Absolute 08/19/2019 0 32     Eosinophils Absolute 08/19/2019 0 00     Basophils Absolute 08/19/2019 0 00     Sodium 08/19/2019 139     Potassium 08/19/2019 3 7     Chloride 08/19/2019 101     CO2 08/19/2019 26     ANION GAP 08/19/2019 12     BUN 08/19/2019 12     Creatinine 08/19/2019 0 69     Glucose 08/19/2019 102     Glucose, Fasting 08/19/2019 102*  Calcium 08/19/2019 9 7     AST 08/19/2019 39     ALT 08/19/2019 58     Alkaline Phosphatase 08/19/2019 74     Total Protein 08/19/2019 7 2     Albumin 08/19/2019 3 9     Total Bilirubin 08/19/2019 0 40     eGFR 08/19/2019 121     Preg, Serum 08/19/2019 Negative     TSH 3RD GENERATON 08/19/2019 2 755     Valproic Acid, Total 08/19/2019 101*    WBC 08/22/2019 5 85     RBC 08/22/2019 4 48     Hemoglobin 08/22/2019 14 6     Hematocrit 08/22/2019 44 0     MCV 08/22/2019 98     MCH 08/22/2019 32 6     MCHC 08/22/2019 33 2     RDW 08/22/2019 11 9     MPV 08/22/2019 9 1     Platelets 34/82/4879 177     nRBC 08/22/2019 0     Neutrophils Relative 08/22/2019 63     Immat GRANS % 08/22/2019 0     Lymphocytes Relative 08/22/2019 29     Monocytes Relative 08/22/2019 8     Eosinophils Relative 08/22/2019 0     Basophils Relative 08/22/2019 0     Neutrophils Absolute 08/22/2019 3 66     Immature Grans Absolute 08/22/2019 0 02     Lymphocytes Absolute 08/22/2019 1 72     Monocytes Absolute 08/22/2019 0 44     Eosinophils Absolute 08/22/2019 0 00     Basophils Absolute 08/22/2019 0 01     Valproic Acid, Total 08/22/2019 63        Psychiatric Review of Systems:  Behavior over the last 24 hours:  improving  Sleep: normal  Appetite: normal  Medication side effects: No  ROS: no complaints    Mental Status Evaluation:  Appearance:  casually dressed   Behavior:  guarded   Speech:  soft   Mood:  anxious   Affect:  constricted   Language naming objects   Thought Process:  normal   Thought Content:  obsessions   Perceptual Disturbances: None   Risk Potential: Suicidal Ideations none, Homicidal Ideations none and Potential for Aggression No   Sensorium:  person and place   Cognition:  grossly intact   Consciousness:  awake    Attention: attention span appeared shorter than expected for age   Insight:  limited   Judgment: limited   Intellect fair   Gait/Station: normal gait/station   Motor Activity: no abnormal movements     Memory: Short and long term memory  fair     Progress Toward Goals: slow progress    Recommended Treatment:   Disposition planning is pending  Continue with group therapy, milieu therapy and occupational therapy  Continue following current medications:   Current Facility-Administered Medications:  acetaminophen 325 mg Oral Q6H PRN Amboy Molt, PA-C   acetaminophen 975 mg Oral Q6H PRN Amboy Molt, PA-C   benztropine 1 mg Intramuscular Q6H PRN Amboy Molt, PA-C   benztropine 1 mg Oral Q6H PRN Amboy Molt, PA-C   benztropine 1 mg Oral BID Amboy Molt, PA-C   divalproex sodium 750 mg Oral HS Madhavi Peña MD   famotidine 20 mg Oral BID Tasha Dozier III, DO   ibuprofen 600 mg Oral Q8H PRN Amboy Molt, PA-C   loperamide 2 mg Oral Q4H PRN CHRISTIANNE Carrillo   loratadine 10 mg Oral Daily Amboy Molt, PA-C   LORazepam 2 mg Intramuscular Q6H PRN Amboy Molt, PA-C   LORazepam 1 mg Oral Q6H PRN Amboy Molt, PA-C   magnesium hydroxide 30 mL Oral Daily PRN Amboy Molt, PA-C   OLANZapine 10 mg Intramuscular Q8H PRN Amboy Molt, PA-C   OLANZapine 10 mg Oral Q8H PRN Amboy Molt, PA-C   ondansetron 4 mg Oral Q6H PRN Yoni Grant   risperiDONE 1 mg Oral Q3H PRN Amboy Molt, PA-C   risperiDONE 3 mg Oral HS Pj Lopez MD   traZODone 50 mg Oral HS PRN Amboy Molt, PA-C       Risks, benefits and possible side effects of Medications:   Risks, benefits, and possible side effects of medications explained to patient and patient verbalizes understanding  Risks of medications in pregnancy explained if female patient  Patient verbalizes understanding and agrees to notify her doctor if she becomes pregnant  This note has been constructed using a voice recognition system  There may be translation, syntax,  or grammatical errors  If you have any questions, please contact the dictating provider

## 2019-09-02 NOTE — PROGRESS NOTES
Daily rounds  Status: per shift report, pt has been denying all symptoms, appears depressed, needs encouragement to eat meals

## 2019-09-02 NOTE — PLAN OF CARE
Problem: Ineffective Coping  Goal: Identifies ineffective coping skills  Outcome: Progressing  Goal: Identifies healthy coping skills  Outcome: Progressing  Goal: Demonstrates healthy coping skills  Outcome: Progressing  Goal: Participates in unit activities  Description  Interventions:  - Provide therapeutic environment   - Provide required programming   - Redirect inappropriate behaviors   Outcome: Progressing  Goal: Patient/Family participate in treatment and DC plans  Description  Interventions:  - Provide therapeutic environment  Outcome: Progressing  Goal: Patient/Family verbalizes awareness of resources  Outcome: Progressing     Problem: Depression  Goal: Treatment Goal: Demonstrate behavioral control of depressive symptoms, verbalize feelings of improved mood/affect, and adopt new coping skills prior to discharge  Outcome: Progressing  Goal: Verbalize thoughts and feelings  Description  Interventions:  - Assess and re-assess patient's level of risk   - Engage patient in 1:1 interactions, daily, for a minimum of 15 minutes   - Encourage patient to express feelings, fears, frustrations, hopes   Outcome: Progressing  Goal: Refrain from harming self  Description  Interventions:  - Monitor patient closely, per order   - Supervise medication ingestion, monitor effects and side effects   Outcome: Progressing  Goal: Refrain from isolation  Description  Interventions:  - Develop a trusting relationship   - Encourage socialization   Outcome: Progressing  Goal: Refrain from self-neglect  Outcome: Progressing  Goal: Attend and participate in unit activities, including therapeutic, recreational, and educational groups  Description  Interventions:  - Provide therapeutic and educational activities daily, encourage attendance and participation, and document same in the medical record   Outcome: Progressing  Goal: Complete daily ADLs, including personal hygiene independently, as able  Description  Interventions:  - Observe, teach, and assist patient with ADLS  -  Monitor and promote a balance of rest/activity, with adequate nutrition and elimination   Outcome: Progressing     Problem: Risk for Violence/Aggression Toward Others  Goal: Treatment Goal: Refrain from acts of violence/aggression during length of stay, and demonstrate improved impulse control at the time of discharge  Outcome: Progressing  Goal: Refrain from harming others  Outcome: Progressing     Problem: Nutrition/Hydration-ADULT  Goal: Nutrient/Hydration intake appropriate for improving, restoring or maintaining nutritional needs  Description  Monitor and assess patient's nutrition/hydration status for malnutrition  Collaborate with interdisciplinary team and initiate plan and interventions as ordered  Monitor patient's weight and dietary intake as ordered or per policy  Utilize nutrition screening tool and intervene as necessary  Determine patient's food preferences and provide high-protein, high-caloric foods as appropriate       INTERVENTIONS:  - Monitor oral intake, urinary output, labs, and treatment plans  - Assess nutrition and hydration status and recommend course of action  - Evaluate amount of meals eaten  - Assist patient with eating if necessary   - Allow adequate time for meals  - Recommend/ encourage appropriate diets, oral nutritional supplements, and vitamin/mineral supplements  - Order, calculate, and assess calorie counts as needed  - Recommend, monitor, and adjust tube feedings and TPN/PPN based on assessed needs  - Assess need for intravenous fluids  - Provide specific nutrition/hydration education as appropriate  - Include patient/family/caregiver in decisions related to nutrition  Outcome: Progressing

## 2019-09-03 PROCEDURE — 99232 SBSQ HOSP IP/OBS MODERATE 35: CPT | Performed by: PSYCHIATRY & NEUROLOGY

## 2019-09-03 RX ORDER — LORAZEPAM 1 MG/1
1 TABLET ORAL EVERY 8 HOURS PRN
Qty: 30 TABLET | Refills: 0 | Status: SHIPPED | OUTPATIENT
Start: 2019-09-03

## 2019-09-03 RX ORDER — FAMOTIDINE 20 MG/1
20 TABLET, FILM COATED ORAL 2 TIMES DAILY
Qty: 60 TABLET | Refills: 0 | Status: SHIPPED | OUTPATIENT
Start: 2019-09-03

## 2019-09-03 RX ORDER — LORATADINE 10 MG/1
10 TABLET ORAL DAILY
Qty: 30 TABLET | Refills: 0 | Status: SHIPPED | OUTPATIENT
Start: 2019-09-04

## 2019-09-03 RX ORDER — BENZTROPINE MESYLATE 1 MG/1
1 TABLET ORAL 2 TIMES DAILY
Qty: 60 TABLET | Refills: 0 | Status: SHIPPED | OUTPATIENT
Start: 2019-09-03

## 2019-09-03 RX ORDER — RISPERIDONE 3 MG/1
3 TABLET, FILM COATED ORAL
Qty: 30 TABLET | Refills: 0 | Status: SHIPPED | OUTPATIENT
Start: 2019-09-03

## 2019-09-03 RX ORDER — DIVALPROEX SODIUM 250 MG/1
750 TABLET, EXTENDED RELEASE ORAL
Qty: 90 TABLET | Refills: 0 | Status: SHIPPED | OUTPATIENT
Start: 2019-09-03

## 2019-09-03 RX ADMIN — BENZTROPINE MESYLATE 1 MG: 1 TABLET ORAL at 09:13

## 2019-09-03 RX ADMIN — BENZTROPINE MESYLATE 1 MG: 1 TABLET ORAL at 17:07

## 2019-09-03 RX ADMIN — FAMOTIDINE 20 MG: 20 TABLET ORAL at 17:07

## 2019-09-03 RX ADMIN — DIVALPROEX SODIUM 750 MG: 500 TABLET, FILM COATED, EXTENDED RELEASE ORAL at 21:00

## 2019-09-03 RX ADMIN — FAMOTIDINE 20 MG: 20 TABLET ORAL at 09:13

## 2019-09-03 RX ADMIN — LORATADINE 10 MG: 10 TABLET ORAL at 09:13

## 2019-09-03 RX ADMIN — RISPERIDONE 3 MG: 2 TABLET ORAL at 21:00

## 2019-09-03 NOTE — CASE MANAGEMENT
CM received a call from Nitesh Hooks of [facility] who confirmed they could accept Pt tomorrow  He asked that Pt's prescriptions be faxed to [phone] today, so they can be filled & ready for her admission tomorrow  CM contacted Pt's ICM, Joseph Mayse, @ [phone] & left a message to inform her of planned d/c for tomorrow to [facility]

## 2019-09-03 NOTE — DISCHARGE INSTR - OTHER ORDERS
Your previously scheduled appointments at [facility] were cancelled on 9/3/2019  Jamey Fisher is a confidential 7 days/week telephone support service manned by trained mental health consumers  [facility] operates daily but is not able to accept calls between 2AM-6AM    [facility] provides support, a listening ear and can provide information about available services  [facility] specializes in the concerns of mental health consumers, their families and friends  However, we are also here for anyone who has a mental health concern, is confused about or just doesn't know anything about mental health or where to get information  To reach Jamey Fisher, call [phone] accepts calls between 6:00 AM to 10:00 AM and from 4:00 PM to 12:00 AM      Text CONNECT to 121406 from anywhere in the [place], anytime, about any type of crisis  A live, trained Crisis Counselor receives the text and lets you know that they are here to listen  The volunteer Crisis Counselor will help you move from a hot moment to a cool moment  [facility] ([facility] AT [facility] - licensed telephone and mobile crisis services that provide mental health assessments to all age groups regardless of income or insurance  [facility] operates 24-hour/7 days a week  [address] [place] [address] assists consumer who are experiencing a mental health emergency and lack the resources to assist themselves  Immediate intervention for suicidal and depressed individuals with home visits/outreach being top priority  Crisis can be contacted at [phone]  [facility] [place] [facility] ([facility]) offers various education & support groups for you & your family  For more information visit their website at http://DirectMoney/     Dial 2-1-1 to get connected/get help    Free, confidential information & referral available 24/7: Aging Services, Child & Youth Services, Counseling, Education/Training, Food/Shelter/Clothing, [employer], Parenting, Substance Abuse, Support Groups, Volunteer Opportunities, & much more    Phone: 2-1-1 or [phone], Web: [facility] [email or web address] [facility], Email: [email or web address]

## 2019-09-03 NOTE — BH TRANSITION RECORD
Contact Information: If you have any questions, concerns, pended studies, tests and/or procedures, or emergencies regarding your inpatient behavioral health visit  Please contact [place] [facility] [phone] and ask to speak to a , nurse or physician  A contact is available 24 hours/ 7 days a week at this number  Summary of Procedures Performed During your Stay:  Below is a list of major procedures performed during your hospital stay and a summary of results:  - No major procedures performed  Pending Studies (From admission, onward)    None        If studies are pending at discharge, follow up with your PCP and/or referring provider

## 2019-09-03 NOTE — DISCHARGE INSTRUCTIONS
Anxiety   WHAT YOU SHOULD KNOW:   Anxiety is a condition that causes you to feel excessive worry, uneasiness, or fear  Family or work stress, smoking, caffeine, and alcohol can increase your risk for anxiety  Certain medicines or health conditions can also increase your risk  Anxiety may begin gradually, and can become a long-term condition if it is not managed or treated  AFTER YOU LEAVE:   Medicines:   · Medicines  can help you feel more calm and relaxed, and decrease your symptoms  · Take your medicine as directed  Contact your healthcare provider if you think your medicine is not helping or if you have side effects  Tell him if you are allergic to any medicine  Keep a list of the medicines, vitamins, and herbs you take  Include the amounts, and when and why you take them  Bring the list or the pill bottles to follow-up visits  Carry your medicine list with you in case of an emergency  Follow up with your healthcare provider within 2 weeks or as directed:  Write down your questions so you remember to ask them during your visits  Manage anxiety:   · Go to counseling as directed  Cognitive behavioral therapy can help you understand and change how you react to events that trigger your symptoms  · Find ways to manage your symptoms  Activities such as exercise, meditation, or listening to music can help you relax  · Practice deep breathing  Breathing can change how your body reacts to stress  Focus on taking slow, deep breaths several times a day, or during an anxiety attack  Breathe in through your nose, and out through your mouth  · Avoid caffeine  Caffeine can make your symptoms worse  Avoid foods or drinks that are meant to increase your energy level  · Limit or avoid alcohol  Ask your healthcare provider if alcohol is safe for you  You may not be able to drink alcohol if you take certain anxiety or depression medicines  Limit alcohol to 1 drink per day if you are a woman   Limit alcohol to 2 drinks per day if you are a man  A drink of alcohol is 12 ounces of beer, 5 ounces of wine, or 1½ ounces of liquor  Contact your healthcare provider if:   · Your symptoms get worse or do not get better with treatment  · You think your medicine may be causing side effects  · Your anxiety keeps you from doing your regular daily activities  · You have new symptoms since your last visit  · You have questions or concerns about your condition or care  Seek care immediately or call 911 if:   · You have chest pain, tightness, or heaviness that may spread to your shoulders, arms, jaw, neck, or back  · You feel like hurting yourself or someone else  · You feel dizzy, lightheaded, or faint  © 2014 [address] [employer] is for End User's use only and may not be sold, redistributed or otherwise used for commercial purposes  All illustrations and images included in CareNotes® are the copyrighted property of A D A M , Inc  or [employer]  The above information is an  only  It is not intended as medical advice for individual conditions or treatments  Talk to your doctor, nurse or pharmacist before following any medical regimen to see if it is safe and effective for you  Bipolar Disorder   WHAT YOU NEED TO KNOW:   Bipolar disorder is a long-term chemical imbalance that causes rapid changes in mood and behavior  High moods are called darien  Low moods are called depression  Sometimes you will feel manic and sometimes you will feel depressed  You can have alternating episodes of darien and depression  This is called a mixed bipolar state  DISCHARGE INSTRUCTIONS:   Call 911 if:   · You think about hurting yourself or someone else  Contact your healthcare provider or psychiatrist if:   · You are having trouble managing your bipolar disorder  · You cannot sleep, or are sleeping all the time       · You cannot eat, or are eating more than usual     · You feel dizzy or your stomach is upset  · You cannot make it to your next meeting  · You have questions or concerns about your condition or care  Medicines:   · Medicines  may be given to help keep your mood stable, or to help you sleep  Changes in medicine are often needed as your bipolar disorder changes  · Take your medicine as directed  Contact your healthcare provider if you think your medicine is not helping or if you have side effects  Tell him or her if you are allergic to any medicine  Keep a list of the medicines, vitamins, and herbs you take  Include the amounts, and when and why you take them  Bring the list or the pill bottles to follow-up visits  Carry your medicine list with you in case of an emergency  Follow up with your healthcare provider or psychiatrist as directed:  Write down your questions so you remember to ask them during your visits  Manage bipolar disorder:  Watch for triggers of bipolar disorder symptoms, such as stress  Learn new ways to relax, such as deep breathing, to manage your stress  Tell someone if you feel a manic or depressive period might be coming on  Ask a friend or family member to help watch you for bipolar symptoms  Work to develop skills that will help you manage bipolar disorder  You may need to make lifestyle changes  Ask your healthcare provider or psychiatrist for resources  For support and more information:   · [address] [facility], [address] [facility], [address] [facility], [address]  [place] [address]   Phone: [phone]  Phone: [phone]  Web Address: [facility] tn  · Depression and [address] ([facility])  [address] [place] [address], [address] [place] [address] , [address]  Phone: [phone]  Web Address: [employer]  org   © 2017 [facility] Information is for End User's use only and may not be sold, redistributed or otherwise used for commercial purposes  All illustrations and images included in CareNotes® are the copyrighted property of A D A M , Inc  or Noble Amanda  The above information is an  only  It is not intended as medical advice for individual conditions or treatments  Talk to your doctor, nurse or pharmacist before following any medical regimen to see if it is safe and effective for you

## 2019-09-03 NOTE — PROGRESS NOTES
Pt pleasant during interaction  States she is looking forward to discharge tomorrow  Feeling less anxious  Pt said she was tired today, napped for awhile and did not eat lunch  Pt said she will eat dinner  Pt played wii in afternoon with peers and showered this evening  Denies all symptoms  No questions/concerns

## 2019-09-03 NOTE — PROGRESS NOTES
Pt pleasant during interaction  Ate breakfast with some encouragement  Pt said she will attend groups today  Pt denies all symptoms  Appears less depressed today  Pt said that visiting the facility she will be going to was scary and she didn't see the whole place  Pt said that she is starting to feel better about going there  No questions/concerns at this time

## 2019-09-03 NOTE — CASE MANAGEMENT
CM faxed Pt's prescriptions to Ronda at [facility]  AQUILES returned a phone call to Otilia Mg, director of clinical services at [facility], @ [phone], but reached voicemail; AQUILES left a message seeking a returned call  CM contacted Ronda, who confirmed the prescriptions were received  He said that they may just need a script for Motrin or things like that, but he would have Otilia Mg follow-up with AQUILES about specifics  CM asked about scheduling Pt with [facility] for outpatient & Ronda reported they have already contact them & Pt has an appointment on Monday

## 2019-09-03 NOTE — PROGRESS NOTES
Status: Pt seclusive to her room & appears depressed at times  Yesterday she ate lunch, refused dinner, but then ate snack  Medication: no changes / no prns  D/C: Weds or Thurs - [facility], CM just has to work out the details with them today       09/03/19 7373   Team Meeting   Meeting Type Daily Rounds   Team Members Present   Team Members Present Physician;Nurse;;Occupational Therapist   Physician Team Member Dr Tomasa Robins / Dejan Merchant Team Member Bisi Arellano / Oma Peña Management Team Member Zoe Leigh    Social Work Team Member Gordon   Patient/Family Present   Patient Present No   Patient's Family Present No

## 2019-09-03 NOTE — PROGRESS NOTES
Pt seclusive to room most of the evening  Refused dinner stating she was still full from earlier  Pt did attend wrap up group and walked down to dining room for snack  Appears with depressed mood, denies SI  States she is doing "okay" and will let staff know if she needs support

## 2019-09-03 NOTE — CASE MANAGEMENT
CM assisted Pt who said that she had been thinking about it, & she would like to visit [facility] again  CM reviewed that per the phone call with Pt's mom on Friday, she was not able to return home, & so they were moving forward with disposition plans for [facility]  CM reviewed that in the next day or two final arrangements would be made  Pt agreeing but stated she is nervous about living in a new place  CM assisted Pt with calling her mom, Javid Romero, @ [phone]  CM reviewed the above & Javid Romero is in agreement  She did say that Pt is paying off her hearing aides & has a balance on a charge card that she must pay, so [facility] is not able to receive her whole check  CM agreed to pass this information along, however, reviewed that Javid Romero is the one that reported Pt cannot return to the home to live & so she would need to pay rent wherever she goes  CM suggested Elsi speak directly with Matt [facility]ney regarding the finances  Pt spoke with her mom & when the call ended she reported she felt better now that she got to speak to her mom  CM & Pt reviewed & signed her IMM

## 2019-09-03 NOTE — PLAN OF CARE
Final arrangements being made for Pt to d/c to [facility]  [facility]

## 2019-09-03 NOTE — PROGRESS NOTES
Progress Note - [id number] [address] Jenny 30 y o  female MRN: [id number]  Unit/Bed#: U 255-02 Encounter: [id number]    Assessment/Plan   Principal Problem:    Bipolar 1 disorder, depressed, severe ([facility] [address] )  Active Problems:    RADHA (generalized anxiety disorder)    TBI (traumatic brain injury) ([email or web address] )    Autism spectrum    Tourette's    31 y/o Female with bipolar I d/o, RADHA, ASD, Tourette's-  She is compliant with medications with no acute side effects  She is showing slow improvement in mood and anxiety  No signs of EPS, she continues to deny Suicidal ideation/intent/plan or Homicidal ideation/intent/plan  Recommended Treatment:   Continue current medical regimen  Continue with group therapy, milieu therapy and occupational therapy  Discharge to [facility]  Risks, benefits and possible side effects of Medications: Risks, benefits, and possible side effects of medications have been explained to the patient, who verbalizes understanding  Progress Toward Goals: slow improvement    ------------------------------------------------------------    Subjective: [address] has been compliant with medications without acute side effects  She reports good sleep, energy, appetite and mood today  She denies any feelings of depression or suicidal ideation with intent or plan  She denies any homicidal ideations with intent or plan  She is attending and participating in group sessions and is socializing in the day room  Patient is consenting for safety on the unit  Psychiatric Review of Systems:  Behavior over the last 24 hours: improved  Sleep: normal  Appetite: good  Medication side effects: none  ROS: Complete review of systems is negative except as noted above      Vital signs in last 24 hours:  Temp:  [96 °F (35 6 °C)-97 5 °F (36 4 °C)] 96 °F (35 6 °C)  HR:  [] 84  Resp:  [18] 18  BP: (106-115)/(58) 115/58    Mental Status Evaluation:  Appearance:  alert, casually dressed, child-like and appropriate grooming and hygiene   Behavior:  cooperative, pleasant, sitting comfortably in chair, good eye contact, no abnormal movements and normal gait and balance   Speech:  spontaneous, clear, normal rate, normal volume and coherent   Mood:  euthymic and "feels great"   Affect:  mood-congruent, appropriate range and euthymic   Thought Process:  decreased rate of thoughts   Thought Content: no verbalized delusions, no overt paranoia, no obsessive thinking   Perceptual disturbances: no auditory hallucinations and no visual hallucinations   Risk Potential: No active or passive suicidal or homicidal ideation, Low potential for aggression   Cognition: appears to be below average intelligence, impaired abstract reasoning and attention span appeared shorter than expected for age   Insight:  Limited   Judgment: Limited       Current Medications:    Current Facility-Administered Medications:  acetaminophen 325 mg Oral Q6H PRN Jorene Carpen, PA-C   acetaminophen 975 mg Oral Q6H PRN Jorene Carpen, PA-C   benztropine 1 mg Intramuscular Q6H PRN Jorene Carpen, PA-C   benztropine 1 mg Oral Q6H PRN Jorene Carpen, PA-C   benztropine 1 mg Oral BID [facility] Carpk[facility], PA-C   divalproex sodium 750 mg Oral HS Valeria Castellanos MD   famotidine 20 mg Oral BID [facility] III, DO   ibuprofen 600 mg Oral Q8H PRN Jopako Carpkirk, PA-C   loperamide 2 mg Oral Q4H PRN CHRISTIANNE Powell   loratadine 10 mg Oral Daily Jorene Carpkirk, PA-C   LORazepam 2 mg Intramuscular Q6H PRN Jorene Carpen, PA-C   LORazepam 1 mg Oral Q6H PRN Jorene Carpen, PA-C   magnesium hydroxide 30 mL Oral Daily PRN Jorene Carpen, PA-C   OLANZapine 10 mg Intramuscular Q8H PRN Jorene Carpen, PA-C   OLANZapine 10 mg Oral Q8H PRN Jorene Carpen, PA-C   ondansetron 4 mg Oral Q6H PRN Yoni Grant   risperiDONE 1 mg Oral Q3H PRN Jorene Carpen, PA-C   risperiDONE 3 mg Oral HS Elbert Garay MD   traZODone 50 mg Oral HS PRN Dashawn Abreu PA-C       Behavioral Health Medications: all current active meds have been reviewed  Changes as above  Laboratory results:  I have personally reviewed all pertinent laboratory/tests results  No results found for this or any previous visit (from the past 48 hour(s))  This note has been constructed using a voice recognition system  There may be translation, syntax, or grammatical errors  If you have any questions, please contact the dictating provider

## 2019-09-04 VITALS
SYSTOLIC BLOOD PRESSURE: 103 MMHG | HEART RATE: 83 BPM | DIASTOLIC BLOOD PRESSURE: 54 MMHG | WEIGHT: 159.61 LBS | HEIGHT: 64 IN | OXYGEN SATURATION: 98 % | BODY MASS INDEX: 27.25 KG/M2 | RESPIRATION RATE: 16 BRPM | TEMPERATURE: 96.6 F

## 2019-09-04 PROCEDURE — 99238 HOSP IP/OBS DSCHRG MGMT 30/<: CPT | Performed by: PSYCHIATRY & NEUROLOGY

## 2019-09-04 RX ADMIN — LORATADINE 10 MG: 10 TABLET ORAL at 09:13

## 2019-09-04 RX ADMIN — BENZTROPINE MESYLATE 1 MG: 1 TABLET ORAL at 09:14

## 2019-09-04 RX ADMIN — FAMOTIDINE 20 MG: 20 TABLET ORAL at 09:13
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
## 2019-09-04 NOTE — NURSING NOTE
AVS and prescriptions reviewed with patient  Pt expresses understanding  Prescriptions previously faxed to receiving facility  Discharged to [facility] [place]  [facility] via

## 2019-09-04 NOTE — PLAN OF CARE
Problem: Ineffective Coping  Goal: Identifies ineffective coping skills  Outcome: Completed  Goal: Identifies healthy coping skills  Outcome: Completed  Goal: Demonstrates healthy coping skills  Outcome: Completed  Goal: Participates in unit activities  Description  Interventions:  - Provide therapeutic environment   - Provide required programming   - Redirect inappropriate behaviors   Outcome: Completed  Goal: Patient/Family participate in treatment and DC plans  Description  Interventions:  - Provide therapeutic environment  Outcome: Completed  Goal: Patient/Family verbalizes awareness of resources  Outcome: Completed     Problem: Depression  Goal: Treatment Goal: Demonstrate behavioral control of depressive symptoms, verbalize feelings of improved mood/affect, and adopt new coping skills prior to discharge  Outcome: Completed  Goal: Verbalize thoughts and feelings  Description  Interventions:  - Assess and re-assess patient's level of risk   - Engage patient in 1:1 interactions, daily, for a minimum of 15 minutes   - Encourage patient to express feelings, fears, frustrations, hopes   Outcome: Completed  Goal: Refrain from harming self  Description  Interventions:  - Monitor patient closely, per order   - Supervise medication ingestion, monitor effects and side effects   Outcome: Completed  Goal: Refrain from isolation  Description  Interventions:  - Develop a trusting relationship   - Encourage socialization   Outcome: Completed  Goal: Refrain from self-neglect  Outcome: Completed  Goal: Attend and participate in unit activities, including therapeutic, recreational, and educational groups  Description  Interventions:  - Provide therapeutic and educational activities daily, encourage attendance and participation, and document same in the medical record   Outcome: Completed  Goal: Complete daily ADLs, including personal hygiene independently, as able  Description  Interventions:  - Observe, teach, and assist patient with ADLS  -  Monitor and promote a balance of rest/activity, with adequate nutrition and elimination   Outcome: Completed     Problem: DISCHARGE PLANNING  Goal: Discharge to home or other facility with appropriate resources  Description  INTERVENTIONS:  - Identify barriers to discharge w/patient and caregiver  - Arrange for needed discharge resources and transportation as appropriate  - Identify discharge learning needs (meds, wound care, etc )  - Arrange for interpretive services to assist at discharge as needed  - Refer to Case Management Department for coordinating discharge planning if the patient needs post-hospital services based on physician/advanced practitioner order or complex needs related to functional status, cognitive ability, or social support system  Outcome: Completed     Problem: Risk for Violence/Aggression Toward Others  Goal: Treatment Goal: Refrain from acts of violence/aggression during length of stay, and demonstrate improved impulse control at the time of discharge  Outcome: Completed  Goal: Refrain from harming others  Outcome: Completed     Problem: Nutrition/Hydration-ADULT  Goal: Nutrient/Hydration intake appropriate for improving, restoring or maintaining nutritional needs  Description  Monitor and assess patient's nutrition/hydration status for malnutrition  Collaborate with interdisciplinary team and initiate plan and interventions as ordered  Monitor patient's weight and dietary intake as ordered or per policy  Utilize nutrition screening tool and intervene as necessary  Determine patient's food preferences and provide high-protein, high-caloric foods as appropriate       INTERVENTIONS:  - Monitor oral intake, urinary output, labs, and treatment plans  - Assess nutrition and hydration status and recommend course of action  - Evaluate amount of meals eaten  - Assist patient with eating if necessary   - Allow adequate time for meals  - Recommend/ encourage appropriate diets, oral nutritional supplements, and vitamin/mineral supplements  - Order, calculate, and assess calorie counts as needed  - Recommend, monitor, and adjust tube feedings and TPN/PPN based on assessed needs  - Assess need for intravenous fluids  - Provide specific nutrition/hydration education as appropriate  - Include patient/family/caregiver in decisions related to nutrition  Outcome: Completed

## 2019-09-04 NOTE — PROGRESS NOTES
Status: Pt pleasant & reports she is a little bit afraid of d/c    Medication: no changes  D/C: Today - [facility]   [facility] will provide transportation between 10/11 AM   Prescriptions were faxed yesterday, but CM is waiting for a call back from the clinical director as they may need orders for Tylenol, etc      09/04/19 2520   Team Meeting   Meeting Type Daily Rounds   Team Members Present   Team Members Present Physician;Nurse;;Occupational Therapist   Physician Team Member Dr Tomasa Robins / Dr Souleymane Mcdonald / Dejan Merchant Team Member MERY [facility] Management Team Member Zoe Leigh   OT Team Member Gordon   Patient/Family Present   Patient Present No   Patient's Family Present No

## 2019-09-04 NOTE — PROGRESS NOTES
Pt OOB and ate breakfast this morning  Pt reports feeling excited about discharge planned for today  Pt reports some anxiety r/t being in a new environment, but feels reassured that she knows the director  Pt is calm and cooperative this morning with brighter affect

## 2019-09-05 NOTE — CASE MANAGEMENT
CM met with Pt to inform her of plans for her to go to [facility] today  Pt agreeable & somewhat excited to be leaving the hospital   Pt planning to get showered & packed  CM reviewed that [facility] would take her on Monday to their outpatient clinic for intake & Pt agreeable

## 2019-11-12 ENCOUNTER — OFFICE VISIT (OUTPATIENT)
Dept: URGENT CARE | Facility: CLINIC | Age: 24
End: 2019-11-12
Payer: COMMERCIAL

## 2019-11-12 VITALS
BODY MASS INDEX: 32.9 KG/M2 | HEIGHT: 62 IN | DIASTOLIC BLOOD PRESSURE: 82 MMHG | HEART RATE: 92 BPM | TEMPERATURE: 97.7 F | RESPIRATION RATE: 16 BRPM | WEIGHT: 178.8 LBS | SYSTOLIC BLOOD PRESSURE: 138 MMHG

## 2019-11-12 DIAGNOSIS — S20.212A CONTUSION OF LEFT CHEST WALL, INITIAL ENCOUNTER: Primary | ICD-10-CM

## 2019-11-12 PROCEDURE — G0382 LEV 3 HOSP TYPE B ED VISIT: HCPCS | Performed by: PHYSICIAN ASSISTANT
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
## 2019-11-12 NOTE — PATIENT INSTRUCTIONS
Take p r n  pain medication as directed as needed  If no p r n  pain medications listed, may take over-the-counter Tylenol as directed on the bottle  Ice and/or heat to the area for 20 minutes, 3 to 4 times a day  If no improvement in 3-4 days follow-up with PCP  If anything changes or worsens follow-up sooner go the ER

## 2020-12-09 ENCOUNTER — OFFICE VISIT (OUTPATIENT)
Dept: GASTROENTEROLOGY | Facility: CLINIC | Age: 25
End: 2020-12-09
Payer: MEDICARE

## 2020-12-09 VITALS
WEIGHT: 172 LBS | HEART RATE: 104 BPM | TEMPERATURE: 98.9 F | HEIGHT: 64 IN | SYSTOLIC BLOOD PRESSURE: 130 MMHG | DIASTOLIC BLOOD PRESSURE: 70 MMHG | BODY MASS INDEX: 29.37 KG/M2

## 2020-12-09 DIAGNOSIS — K59.04 CHRONIC IDIOPATHIC CONSTIPATION: Primary | ICD-10-CM

## 2020-12-09 DIAGNOSIS — R10.84 GENERALIZED ABDOMINAL PAIN: ICD-10-CM

## 2020-12-09 PROBLEM — E78.1 HYPERTRIGLYCERIDEMIA: Status: ACTIVE | Noted: 2019-09-11

## 2020-12-09 PROBLEM — H91.93 BILATERAL HEARING LOSS: Status: ACTIVE | Noted: 2019-09-11

## 2020-12-09 PROBLEM — J30.2 SEASONAL ALLERGIES: Status: ACTIVE | Noted: 2019-09-11

## 2020-12-09 PROBLEM — E22.1 HYPERPROLACTINEMIA (HCC): Status: ACTIVE | Noted: 2020-01-10

## 2020-12-09 PROBLEM — F45.8 BRUXISM (TEETH GRINDING): Status: ACTIVE | Noted: 2020-06-01

## 2020-12-09 PROBLEM — E66.9 OBESITY, CLASS I, BMI 30.0-34.9 (SEE ACTUAL BMI): Status: ACTIVE | Noted: 2019-09-11

## 2020-12-09 PROBLEM — E66.811 OBESITY, CLASS I, BMI 30.0-34.9 (SEE ACTUAL BMI): Status: ACTIVE | Noted: 2019-09-11

## 2020-12-09 PROBLEM — E03.9 HYPOTHYROIDISM: Status: ACTIVE | Noted: 2020-06-24

## 2020-12-09 PROBLEM — K21.9 GASTROESOPHAGEAL REFLUX DISEASE WITHOUT ESOPHAGITIS: Status: ACTIVE | Noted: 2019-09-11

## 2020-12-09 PROCEDURE — 99204 OFFICE O/P NEW MOD 45 MIN: CPT | Performed by: INTERNAL MEDICINE

## 2020-12-09 RX ORDER — POLYETHYLENE GLYCOL 3350 17 G/17G
17 POWDER, FOR SOLUTION ORAL DAILY
COMMUNITY
End: 2020-12-09

## 2020-12-09 RX ORDER — NORGESTIMATE AND ETHINYL ESTRADIOL
KIT
COMMUNITY
Start: 2020-11-23

## 2020-12-09 RX ORDER — LEVOTHYROXINE SODIUM 0.03 MG/1
25 TABLET ORAL DAILY
COMMUNITY
Start: 2020-07-07 | End: 2021-07-07

## 2020-12-09 RX ORDER — POLYETHYLENE GLYCOL 3350 17 G/17G
17 POWDER, FOR SOLUTION ORAL DAILY
Qty: 1530 G | Refills: 0 | Status: SHIPPED | OUTPATIENT
Start: 2020-12-09 | End: 2021-03-09

## 2020-12-09 RX ORDER — SENNA PLUS 8.6 MG/1
1 TABLET ORAL DAILY
COMMUNITY

## 2020-12-09 RX ORDER — POLYETHYLENE GLYCOL 3350 17 G/17G
17 POWDER, FOR SOLUTION ORAL 2 TIMES DAILY
Qty: 510 G | Refills: 3 | Status: SHIPPED | OUTPATIENT
Start: 2020-12-09 | End: 2021-03-09

## 2021-02-12 ENCOUNTER — TELEPHONE (OUTPATIENT)
Dept: GASTROENTEROLOGY | Facility: CLINIC | Age: 26
End: 2021-02-12

## 2021-03-10 ENCOUNTER — OFFICE VISIT (OUTPATIENT)
Dept: GASTROENTEROLOGY | Facility: CLINIC | Age: 26
End: 2021-03-10
Payer: MEDICARE

## 2021-03-10 VITALS
WEIGHT: 160.8 LBS | TEMPERATURE: 98.2 F | SYSTOLIC BLOOD PRESSURE: 112 MMHG | HEART RATE: 103 BPM | DIASTOLIC BLOOD PRESSURE: 70 MMHG | HEIGHT: 64 IN | BODY MASS INDEX: 27.45 KG/M2

## 2021-03-10 DIAGNOSIS — K59.04 CHRONIC IDIOPATHIC CONSTIPATION: ICD-10-CM

## 2021-03-10 PROCEDURE — 99213 OFFICE O/P EST LOW 20 MIN: CPT | Performed by: PHYSICIAN ASSISTANT
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
## 2021-03-10 NOTE — PROGRESS NOTES
[facility] - Outpatient Follow-up Note  Mitra Mary 27 y o  female MRN: [id number]  Encounter: [id number]          ASSESSMENT AND PLAN:      1  Chronic idiopathic constipation  Significantly improved  Continue MiraLax twice daily  Continue eating lots of fruits, vegetables, nuts, seeds for fiber  Continue drinking plenty of water daily enough so urine is light yellow or clear in color  Continue walking for physical activity  Follow-up in 1 year  ______________________________________________________________________    SUBJECTIVE:  53-year-old female from group home presenting for follow-up of constipation  She has history of GERD, bruxism, traumatic brain injury in 3667 complicated by bilateral hearing loss, major depressive disorder, Tourette's, generalized anxiety disorder, bipolar disorder, autism spectrum, hypothyroidism  She provides majority of history, but her  Audrie Bosworth is present and also provides history  She is doing much better overall  She is taking MiraLax twice daily and having bowel movements every other day  The stool is soft and she does not strain  She denies blood in the stool  She denies abdominal pain  She denies nausea and vomiting  She denies heartburn  She enjoys eating vegetables, fruits, nuts, and seeds  She drinks lots of water and also V8 juice  She likes to go for walks for exercise    REVIEW OF SYSTEMS IS OTHERWISE NEGATIVE  Historical Information   Past Medical History:   Diagnosis Date    Adjustment disorder     Anxiety     Autism spectrum disorder     [facility]     Depression     [facility] of [facility]     Obsessive-compulsive disorder     Oppositional defiant disorder     Seizures ([facility] )     Sub[facility]achnoid hemorrhage ([facility] )     Traumatic brain injury ([facility] )      History reviewed  No pertinent surgical history    Social History   Social History     Substance and Sexual Activity   Alcohol Use No    Frequency: Never Social History     Substance and Sexual Activity   Drug Use Not on file     Social History     Tobacco Use   Smoking Status Never Smoker   Smokeless Tobacco Never Used     Family History   Adopted: Yes   Family history unknown: Yes       Meds/Allergies       Current Outpatient Medications:     benztropine (COGENTIN) 1 mg tablet    divalproex sodium (DEPAKOTE ER) 250 mg 24 hr tablet    famotidine (PEPCID) 20 mg tablet    levothyroxine 25 mcg tablet    loratadine (CLARITIN) 10 mg tablet    LORazepam (ATIVAN) 1 mg tablet    risperiDONE (RisperDAL) 3 mg tablet    senna (SENOKOT) 8 6 MG tablet    Tri-Lo-Sprintec 0 18/0 215/0 25 MG-25 MCG per tablet    polyethylene glycol (GLYCOLAX) 17 GM/SCOOP powder    polyethylene glycol (GLYCOLAX) 17 GM/SCOOP powder    Allergies   Allergen Reactions    Eggs Or Egg-Derived Products     Fdc Yellow [Yellow Dye]     Orange Juice [Orange Oil] Irritability           Objective     Blood pressure 112/70, pulse 103, temperature 98 2 °F (36 8 °C), temperature source Tympanic, height 5' 4" (1 626 m), weight 72 9 kg (160 lb 12 8 oz)  Body mass index is 27 6 kg/m²  PHYSICAL EXAM:      General Appearance:   Alert, cooperative, no distress   HEENT:   Normocephalic, atraumatic, anicteric      Neck:  Supple, symmetrical, trachea midline   Lungs:   Clear to auscultation bilaterally; no rales, rhonchi or wheezing; respirations unlabored    Heart[de-identified]   Regular rate and rhythm; no murmur, rub, or gallop  Abdomen:   Soft, non-tender, non-distended; normal bowel sounds; no masses, no organomegaly    Genitalia:   Deferred    Rectal:   Deferred    Extremities:  No cyanosis, clubbing or edema    Pulses:  2+ and symmetric    Skin:  No jaundice, rashes, or lesions    Lymph nodes:  No palpable cervical lymphadenopathy        Lab Results:   No visits with results within 1 Day(s) from this visit     Latest known visit with results is:   Admission on 08/13/2019, Discharged on 08/14/2019 Component Date Value    Amph/Meth UR 08/13/2019 Negative     Barbiturate Ur 08/13/2019 Negative     Benzodiazepine Urine 08/13/2019 Negative     Cocaine Urine 08/13/2019 Negative     Methadone Urine 08/13/2019 Negative     Opiate Urine 08/13/2019 Negative     PCP Ur 08/13/2019 Negative     THC Urine 08/13/2019 Positive*    EXT PREG TEST UR (Ref: N* 08/13/2019 negaTIVE FOR PREG     Control 08/13/2019 CONTROL IS PRESENT   [id number]   12/31/2020     Ethanol Lvl 08/13/2019 <10          Radiology Results:   No results found

## 2022-02-04 ENCOUNTER — OFFICE VISIT (OUTPATIENT)
Dept: GASTROENTEROLOGY | Facility: CLINIC | Age: 27
End: 2022-02-04
Payer: COMMERCIAL

## 2022-02-04 VITALS
SYSTOLIC BLOOD PRESSURE: 110 MMHG | WEIGHT: 163.8 LBS | DIASTOLIC BLOOD PRESSURE: 62 MMHG | HEIGHT: 64 IN | TEMPERATURE: 98.8 F | BODY MASS INDEX: 27.96 KG/M2

## 2022-02-04 DIAGNOSIS — K21.9 GASTROESOPHAGEAL REFLUX DISEASE WITHOUT ESOPHAGITIS: Primary | ICD-10-CM

## 2022-02-04 DIAGNOSIS — K59.04 CHRONIC IDIOPATHIC CONSTIPATION: ICD-10-CM

## 2022-02-04 PROCEDURE — 99213 OFFICE O/P EST LOW 20 MIN: CPT | Performed by: PHYSICIAN ASSISTANT

## 2022-02-04 RX ORDER — CITALOPRAM 10 MG/1
TABLET ORAL
COMMUNITY
Start: 2022-01-17

## 2022-02-04 NOTE — LETTER
February 4, 2022     Zia Burris MD  [address]  [address]  [address] [facility] [address]    Patient: Demi Ocasio   YOB: 1995   Date of Visit: 2/4/2022       Dear Dr Niraj Banuelos: Thank you for referring Efrem Campbell to me for evaluation  Below are my notes for this consultation  If you have questions, please do not hesitate to call me  I look forward to following your patient along with you  Sincerely,        Unique Adkins PA-C        CC: No Recipients  Unique Adkins PA-C  2/4/2022  9:39 AM  Sign when Signing Visit  [facility] - Outpatient Follow-up Note  Demi Ocasio 27 y o  female MRN: [id number]  Encounter: [id number]          ASSESSMENT AND PLAN:      1  GERD without esophagitis  Stable  She is taking antacids on an as-needed basis  I reviewed dietary recommendations including limiting soda, tomatoes, chocolate, peppermint, spicy and fatty foods  I recommend waiting 3 hours after eating before lying down and trying to eat small frequent meals throughout the day  2  Chronic idiopathic constipation  Stable  She is no longer requiring MiraLax  Continue eating lots of fruits, vegetables - high fiber foods  Continue drinking plenty of water daily      Follow-up in 1 year  ______________________________________________________________________    SUBJECTIVE:  69-year-old female from [facility] presenting for follow-up of constipation  She has history of GERD, bruxism, traumatic brain injury in 3453 complicated by bilateral hearing loss, major depressive disorder, Tourette's, generalized anxiety disorder, bipolar disorder, autism spectrum, hypothyroidism       Her caretaker is present for today's visit but Marion provides her own history  She is feeling well  She is having regular bowel movements, either multiple times per day or multiple times per week  She is no longer taking MiraLax  She does try to drink lots of water and V8 juice    Her diet is broad and she does like vegetables and fruits  She denies blood in the stool  She denies abdominal pain  She denies nausea and vomiting  She has occasional heartburn mainly after she drinks soda  She does not drink soda everyday only a few times per week  REVIEW OF SYSTEMS IS OTHERWISE NEGATIVE  Historical Information   Past Medical History:   Diagnosis Date    Adjustment disorder     Anxiety     Autism spectrum disorder     Bipolar disorder ([place] [address] )     Depression     Fracture of skull ([place] [address] )     Obsessive-compulsive disorder     Oppositional defiant disorder     Seizures ([place] [address] )     Subarachnoid hemorrhage ([place] [address] )     Traumatic brain injury ([place] [address] )      No past surgical history on file  Social History   Social History     Substance and Sexual Activity   Alcohol Use No     Social History     Substance and Sexual Activity   Drug Use Not on file     Social History     Tobacco Use   Smoking Status Never Smoker   Smokeless Tobacco Never Used     Family History   Adopted: Yes   Family history unknown: Yes       Meds/Allergies       Current Outpatient Medications:     benztropine (COGENTIN) 1 mg tablet    divalproex sodium (DEPAKOTE ER) 250 mg 24 hr tablet    famotidine (PEPCID) 20 mg tablet    levothyroxine 25 mcg tablet    loratadine (CLARITIN) 10 mg tablet    LORazepam (ATIVAN) 1 mg tablet    polyethylene glycol (GLYCOLAX) 17 GM/SCOOP powder    polyethylene glycol (GLYCOLAX) 17 GM/SCOOP powder    risperiDONE (RisperDAL) 3 mg tablet    senna (SENOKOT) 8 6 MG tablet    Tri-Lo-Sprintec 0 18/0 215/0 25 MG-25 MCG per tablet    Allergies   Allergen Reactions    Eggs Or Egg-Derived Products - Food Allergy     Fdc Yellow [Yellow Dye - Food Allergy]     Orange Juice [Orange Oil - Food Allergy] Irritability           Objective     There were no vitals taken for this visit  There is no height or weight on file to calculate BMI        PHYSICAL EXAM:      General Appearance:   Alert, cooperative, no distress   HEENT:   Normocephalic, atraumatic, anicteric      Neck:  Supple, symmetrical, trachea midline   Lungs:   Clear to auscultation bilaterally; no rales, rhonchi or wheezing; respirations unlabored    Heart[de-identified]   Regular rate and rhythm; no murmur, rub, or gallop  Abdomen:   Soft, non-tender, non-distended; normal bowel sounds; no masses, no organomegaly    Genitalia:   Deferred    Rectal:   Deferred    Extremities:  No cyanosis, clubbing or edema    Pulses:  2+ and symmetric    Skin:  No jaundice, rashes, or lesions    Lymph nodes:  No palpable cervical lymphadenopathy        Lab Results:   No visits with results within 1 Day(s) from this visit  Latest known visit with results is:   Admission on 08/13/2019, Discharged on 08/14/2019   Component Date Value    Amph/Meth UR 08/13/2019 Negative     Barbiturate Ur 08/13/2019 Negative     Benzodiazepine Urine 08/13/2019 Negative     Cocaine Urine 08/13/2019 Negative     Methadone Urine 08/13/2019 Negative     Opiate Urine 08/13/2019 Negative     PCP Ur 08/13/2019 Negative     THC Urine 08/13/2019 Positive*    EXT PREG TEST UR (Ref: N* 08/13/2019 negaTIVE FOR PREG     Control 08/13/2019 CONTROL IS PRESENT   [id number]   12/31/2020     Ethanol Lvl 08/13/2019 <10          Radiology Results:   No results found

## 2022-02-04 NOTE — PROGRESS NOTES
[facility] - Outpatient Follow-up Note  Lloyd Vanessa 27 y o  female MRN: [id number]  Encounter: [id number]          ASSESSMENT AND PLAN:      1  GERD without esophagitis  Stable  She is taking antacids on an as-needed basis  I reviewed dietary recommendations including limiting soda, tomatoes, chocolate, peppermint, spicy and fatty foods  I recommend waiting 3 hours after eating before lying down and trying to eat small frequent meals throughout the day  2  Chronic idiopathic constipation  Stable  She is no longer requiring MiraLax  Continue eating lots of fruits, vegetables - high fiber foods  Continue drinking plenty of water daily      Follow-up in 1 year  ______________________________________________________________________    SUBJECTIVE:  40-year-old female from [facility] presenting for follow-up of constipation  She has history of GERD, bruxism, traumatic brain injury in 6703 complicated by bilateral hearing loss, major depressive disorder, Tourette's, generalized anxiety disorder, bipolar disorder, autism spectrum, hypothyroidism       Her caretaker is present for today's visit but Dorothy Ceballos provides her own history  She is feeling well  She is having regular bowel movements, either multiple times per day or multiple times per week  She is no longer taking MiraLax  She does try to drink lots of water and V8 juice  Her diet is broad and she does like vegetables and fruits  She denies blood in the stool  She denies abdominal pain  She denies nausea and vomiting  She has occasional heartburn mainly after she drinks soda  She does not drink soda everyday only a few times per week  REVIEW OF SYSTEMS IS OTHERWISE NEGATIVE        Historical Information   Past Medical History:   Diagnosis Date    Adjustment disorder     Anxiety     Autism spectrum disorder     Bipolar disorder ([address] )     Depression     Fracture of skull (HCC)     Obsessive-compulsive disorder     Oppositional defiant disorder     Seizures (HCC)     [facility]     Traumatic brain injury ([facility] )      No past surgical history on file  Social History   Social History     Substance and Sexual Activity   Alcohol Use No     Social History     Substance and Sexual Activity   Drug Use Not on file     Social History     Tobacco Use   Smoking Status Never Smoker   Smokeless Tobacco Never Used     Family History   Adopted: Yes   Family history unknown: Yes       Meds/Allergies       Current Outpatient Medications:     benztropine (COGENTIN) 1 mg tablet    divalproex sodium (DEPAKOTE ER) 250 mg 24 hr tablet    famotidine (PEPCID) 20 mg tablet    levothyroxine 25 mcg tablet    loratadine (CLARITIN) 10 mg tablet    LORazepam (ATIVAN) 1 mg tablet    polyethylene glycol (GLYCOLAX) 17 GM/SCOOP powder    polyethylene glycol (GLYCOLAX) 17 GM/SCOOP powder    risperiDONE (RisperDAL) 3 mg tablet    senna (SENOKOT) 8 6 MG tablet    Tri-Lo-Sprintec 0 18/0 215/0 25 MG-25 MCG per tablet    Allergies   Allergen Reactions    Eggs Or Egg-Derived Products - Food Allergy     Fdc Yellow [Yellow Dye - Food Allergy]     Orange Juice [Orange Oil - Food Allergy] Irritability           Objective     There were no vitals taken for this visit  There is no height or weight on file to calculate BMI  PHYSICAL EXAM:      General Appearance:   Alert, cooperative, no distress   HEENT:   Normocephalic, atraumatic, anicteric      Neck:  Supple, symmetrical, trachea midline   Lungs:   Clear to auscultation bilaterally; no rales, rhonchi or wheezing; respirations unlabored    Heart[de-identified]   Regular rate and rhythm; no murmur, rub, or gallop     Abdomen:   Soft, non-tender, non-distended; normal bowel sounds; no masses, no organomegaly    Genitalia:   Deferred    Rectal:   Deferred    Extremities:  No cyanosis, clubbing or edema    Pulses:  2+ and symmetric    Skin:  No jaundice, rashes, or lesions    Lymph nodes:  No palpable cervical lymphadenopathy        Lab Results:   No visits with results within 1 Day(s) from this visit  Latest known visit with results is:   Admission on 08/13/2019, Discharged on 08/14/2019   Component Date Value    Amph/Meth UR 08/13/2019 Negative     Barbiturate Ur 08/13/2019 Negative     Benzodiazepine Urine 08/13/2019 Negative     Cocaine Urine 08/13/2019 Negative     Methadone Urine 08/13/2019 Negative     Opiate Urine 08/13/2019 Negative     PCP Ur 08/13/2019 Negative     THC Urine 08/13/2019 Positive*    EXT PREG TEST UR (Ref: N* 08/13/2019 negaTIVE FOR PREG     Control 08/13/2019 CONTROL IS PRESENT   [id number]   12/31/2020     Ethanol Lvl 08/13/2019 <10          Radiology Results:   No results found

## 2022-10-07 NOTE — PROGRESS NOTES
07/15/19 0937   Team Meeting   Meeting Type Daily Rounds   Patient/Family Present   Patient Present No   Patient's Family Present No     Daily Psychiatric Rounds    Team Members Present:    MD Manju Mike CRNP Darene Mantel, MSW [facility] [place], [place]  Renae Nayak, PRIYANKA Yao, PRIYANKA    Discussion:     Has been calm and controlled  Expresses desire to be discharged  CM to contact Mother regarding discharge plan  Pt hopeful to change services from [place] to new Psych rehab clinic with Dr Vasquez Carrizales  Adult

## 2023-06-22 NOTE — TREATMENT PLAN
Treatment Plan [address] 30 y o  female MRN: [id number]     Todd [place] [id number] Encounter: [id number]          Admit Date/Time:  7/11/2019 10:43 AM    Treatment Team: Attending Provider: Gurvinder Rodriguez MD; Consulting Physician: Micah Bennett; Patient Care Assistant: Lauro Rodriguez; Patient Care Assistant: Damaris Pretty Day; Patient Care Assistant: Jenn Lai; Patient Care Technician: Tino Louie; Patient Care Assistant: Jose Paz; Nursing Student: Jillian Key; Certified Nursing Assistant: Kristene Cooks; Care Manager: Alayna Barraza RN; Recreational Therapist: Farhad Perez; Patient Care Assistant: Milo Gardiner;  Registered Nurse: Lesa Jordan RN    Diagnosis: Principal Problem:    Bipolar 1 disorder, depressed, severe ([facility] )  Active Problems:    TBI (traumatic brain injury) ([facility] )    Autism spectrum    Tourette's      Mental Status Evaluation:  Appearance:  casually dressed   Behavior:  guarded   Speech:  soft   Mood:  depressed   Affect:  constricted   Language: repeating phrases   Thought Process:  circumstantial   Thought Content:  obsessions   Perceptual Disturbances: None   Risk Potential: Suicidal Ideations without plan   Sensorium:  person and place   Cognition:  grossly intact   Consciousness:  alert and awake    Attention: attention span and concentration were age appropriate   Intellect: not examined   Fund of Knowledge: vocabulary: fair   Insight:  limited   Judgment: limited   Muscle Location: face and neck   Gait/Station: slow   Motor Activity: no abnormal movements     Patient Strengths: compliant with medication     Patient Barriers: self-care deficit    Progress Toward Goals: ongoing    Recommended Treatment: individual therapy     Treatment Frequency: 1-2 times per week     Expected Discharge Date: 7/19/2019    Discharge Plan: referrals as indicated     Treatment Plan Created/Updated By: Gurvinder Rodriguez MD
59

## 2023-09-03 ENCOUNTER — OFFICE VISIT (OUTPATIENT)
Dept: URGENT CARE | Facility: CLINIC | Age: 28
End: 2023-09-03
Payer: COMMERCIAL

## 2023-09-03 VITALS
RESPIRATION RATE: 18 BRPM | OXYGEN SATURATION: 98 % | DIASTOLIC BLOOD PRESSURE: 61 MMHG | HEART RATE: 107 BPM | TEMPERATURE: 98.3 F | SYSTOLIC BLOOD PRESSURE: 131 MMHG

## 2023-09-03 DIAGNOSIS — B35.4 TINEA CORPORIS: Primary | ICD-10-CM

## 2023-09-03 PROCEDURE — G0463 HOSPITAL OUTPT CLINIC VISIT: HCPCS | Performed by: PHYSICIAN ASSISTANT

## 2023-09-03 PROCEDURE — 99213 OFFICE O/P EST LOW 20 MIN: CPT | Performed by: PHYSICIAN ASSISTANT

## 2023-09-03 RX ORDER — SENNA AND DOCUSATE SODIUM 50; 8.6 MG/1; MG/1
1 TABLET, FILM COATED ORAL DAILY
COMMUNITY

## 2023-09-03 RX ORDER — CHOLECALCIFEROL (VITAMIN D3) 125 MCG
CAPSULE ORAL
COMMUNITY
Start: 2023-08-30

## 2023-09-03 RX ORDER — RISPERIDONE 1 MG/1
TABLET ORAL
COMMUNITY
Start: 2023-08-30

## 2023-09-03 RX ORDER — CLOTRIMAZOLE AND BETAMETHASONE DIPROPIONATE 10; .64 MG/G; MG/G
CREAM TOPICAL
Start: 2023-09-03

## 2023-09-03 RX ORDER — ACETAMINOPHEN 325 MG/1
TABLET ORAL
COMMUNITY
Start: 2023-07-10

## 2023-09-03 RX ORDER — TRAZODONE HYDROCHLORIDE 100 MG/1
TABLET ORAL
COMMUNITY
Start: 2023-08-30

## 2023-09-03 NOTE — PROGRESS NOTES
[facility]        NAME: Gates Rinne is a 25 y.o. female  : 1995    MRN: [id number]  DATE: September 3, 2023  TIME: 10:24 AM    Assessment and Plan   Tinea corporis [B35.4]  1. Tinea corporis     Patient given paper script for lotrisone 1% cream to apply bid for up to 14 days. She was instructed to clean area and keep it dry. May sleep without pants on. Wash hands after applying cream.         Patient Instructions       Follow up with PCP in 3-5 days. Proceed to  ER if symptoms worsen. Chief Complaint     Chief Complaint   Patient presents with   • Skin Irritation on Right Inner Thigh      Pt reports she developed on right side of inner thigh a few days ago (approx Thursday night). History of Present Illness     HPI   Patient is a 26 yo female who presents with red rash to R inner thigh and some redness started on L inner thigh as well. She lives in assisted living facility, comes with staff member Jolie Sam whom stayed in waiting room. No blisters. It does not itch but is does burn at times. She as not tried anything for it. It is not spreading. Review of Systems   Review of Systems   Constitutional: Negative. Skin: Positive for rash. Neurological: Negative.           Current Medications       Current Outpatient Medications:   •  acetaminophen (TYLENOL) 325 mg tablet, , Disp: , Rfl:   •  benztropine (COGENTIN) 1 mg tablet, Take 1 tablet (1 mg total) by mouth 2 (two) times a day At 9am and 6pm, Disp: 60 tablet, Rfl: 0  •  citalopram (CeleXA) 10 mg tablet, , Disp: , Rfl:   •  clotrimazole-betamethasone (LOTRISONE) 1-0.05 % cream, Apply bid for up to 14 days, Disp: , Rfl:   •  divalproex sodium (DEPAKOTE ER) 250 mg 24 hr tablet, Take 3 tablets (750 mg total) by mouth daily at bedtime, Disp: 90 tablet, Rfl: 0  •  levothyroxine 25 mcg tablet, Take 25 mcg by mouth daily, Disp: , Rfl:   •  LORazepam (ATIVAN) 1 mg tablet, Take 1 tablet (1 mg total) by mouth every 8 (eight) hours as needed for anxiety, Disp: 30 tablet, Rfl: 0  •  Melatonin 5 MG TABS, , Disp: , Rfl:   •  risperiDONE (RisperDAL) 1 mg tablet, , Disp: , Rfl:   •  risperiDONE (RisperDAL) 3 mg tablet, Take 1 tablet (3 mg total) by mouth daily at bedtime, Disp: 30 tablet, Rfl: 0  •  senna (SENOKOT) 8.6 MG tablet, Take 1 tablet by mouth daily, Disp: , Rfl:   •  senna-docusate sodium (SENOKOT-S) 8.6-50 mg per tablet, Take 1 tablet by mouth daily, Disp: , Rfl:   •  traZODone (DESYREL) 100 mg tablet, , Disp: , Rfl:   •  Tri-Lo-Sprintec 0.18/0.215/0.25 MG-25 MCG per tablet, , Disp: , Rfl:   •  famotidine (PEPCID) 20 mg tablet, Take 1 tablet (20 mg total) by mouth 2 (two) times a day Before breakfast and dinner (Patient not taking: Reported on 9/3/2023), Disp: 60 tablet, Rfl: 0  •  loratadine (CLARITIN) 10 mg tablet, Take 1 tablet (10 mg total) by mouth daily At 9am (Patient not taking: Reported on 9/3/2023), Disp: 30 tablet, Rfl: 0  •  polyethylene glycol (GLYCOLAX) 17 GM/SCOOP powder, Take 17 g by mouth daily Start 1 capful daily. Take this dose x 3 days. If your symptoms are improved, great! Continue this dose daily. If you have diarrhea (stools are loose, associated with accidents, or urgency) with this dose, cut down to 1/2 capful daily. Continue to titrate down until you find the dose for you. This might be 1 tbsp daily. Wait 3 days before making a change. If you have continued constipation with 1 capful daily, you may need a higher dose. Start with 1.5 capfuls daily and titrate upward. Eg might need 1 capful twice daily. There is no maximum dose, whatever works for you.  Again, wait 3 days before making a change., Disp: 1530 g, Rfl: 0    Current Allergies     Allergies as of 09/03/2023 - Reviewed 09/03/2023   Allergen Reaction Noted   • Eggs or egg-derived products - food allergy  06/12/2019   • Fdc yellow [yellow dye - food allergy]  04/07/2015   • Orange juice [orange oil - food allergy] Irritability 11/12/2019   • Pollen extract Other (See Comments) 04/01/2021            The following portions of the patient's history were reviewed and updated as appropriate: allergies, current medications, past family history, past medical history, past social history, past surgical history and problem list.     Past Medical History:   Diagnosis Date   • Adjustment disorder    • Anxiety    • Autism spectrum disorder    • Bipolar disorder ([address])    • Depression    • Fracture of skull ([address])    • Obsessive-compulsive disorder    • Oppositional defiant disorder    • Seizures ([address])    • Subarachnoid hemorrhage ([address])    • Traumatic brain injury ([address])        History reviewed. No pertinent surgical history. Family History   Adopted: Yes   Family history unknown: Yes         Medications have been verified. Objective   /61   Pulse (!) 107   Temp 98.3 °F (36.8 °C)   Resp 18   LMP 08/30/2023 (Exact Date) Comment: Pt reports LMP was normal.  SpO2 98%   Patient's last menstrual period was 08/30/2023 (exact date). Physical Exam     Physical Exam  Constitutional:       General: She is not in acute distress. Appearance: She is not ill-appearing. Pulmonary:      Effort: No respiratory distress. Skin:     General: Skin is warm and dry. Findings: Rash (erythematous patchy slightly raised rash about 2 x 3 inches, no blisters, no open skin of R inner thigh along underwear line; slight erythema/irriation along L inner thigh) present. Neurological:      Mental Status: She is alert.

## 2024-01-03 NOTE — PROGRESS NOTES
Addended by: Praveena Maria on: 10/13/2022 03:26 PM     Modules accepted: Orders Progress Note - [address] [address] Jenny 30 y o  female MRN: [id number]  Unit/Bed#: [facility] 245-01 Encounter: [id number]    Assessment/Plan   Principal Problem:    Bipolar 1 disorder, depressed, severe ([facility] 75 )  Active Problems:    TBI (traumatic brain injury) ([facility] )    Autism spectrum    Tourette's    P:  Continue current treatment and tentative discharge is planned for tomorrow    Behavior over the last 24 hours:  Patient initially was upset about the room change, but later on she seemed to be doing okay and requested discharge to her mother  Patient reports current medications are working properly and does not want any change    Sleep: normal  Appetite: normal  Medication side effects: No  ROS: no complaints    Mental Status Evaluation:  Appearance:  casually dressed   Behavior:  guarded   Speech:  normal volume   Mood:  anxious   Affect:  constricted   Thought Process:  circumstantial   Thought Content:  normal   Perceptual Disturbances: None   Risk Potential: Potential for Aggression No   Sensorium:  person and place   Cognition:  grossly intact   Consciousness:  alert and awake    Attention: attention span and concentration were age appropriate   Insight:  limited   Judgment: limited   Gait/Station: normal balance   Motor Activity: no abnormal movements     Progress Toward Goals: ongoing    Recommended Treatment: Continue with group therapy, milieu therapy and occupational therapy  Risks, benefits and possible side effects of Medications:   Risks, benefits, and possible side effects of medications explained to patient and patient verbalizes understanding  Medications: all current active meds have been reviewed and continue current psychiatric medications  Labs: reviewed    Counseling / Coordination of Care  Total floor / unit time spent today 15 minutes  Greater than 50% of total time was spent with the patient and / or family counseling and / or coordination of care   A description of the counseling / coordination of care: Vaccine status unknown

## 2024-02-23 ENCOUNTER — OFFICE VISIT (OUTPATIENT)
Dept: URGENT CARE | Facility: CLINIC | Age: 29
End: 2024-02-23
Payer: COMMERCIAL

## 2024-02-23 VITALS
SYSTOLIC BLOOD PRESSURE: 122 MMHG | RESPIRATION RATE: 18 BRPM | TEMPERATURE: 98.8 F | DIASTOLIC BLOOD PRESSURE: 70 MMHG | OXYGEN SATURATION: 98 % | WEIGHT: 187 LBS | HEART RATE: 98 BPM | BODY MASS INDEX: 32.1 KG/M2

## 2024-02-23 DIAGNOSIS — J06.9 UPPER RESPIRATORY TRACT INFECTION, UNSPECIFIED TYPE: Primary | ICD-10-CM

## 2024-02-23 PROCEDURE — G0463 HOSPITAL OUTPT CLINIC VISIT: HCPCS | Performed by: FAMILY MEDICINE

## 2024-02-23 PROCEDURE — 99213 OFFICE O/P EST LOW 20 MIN: CPT | Performed by: FAMILY MEDICINE

## 2024-02-23 RX ORDER — OFLOXACIN 3 MG/ML
1 SOLUTION/ DROPS OPHTHALMIC 4 TIMES DAILY
Qty: 5 ML | Refills: 0 | Status: SHIPPED | OUTPATIENT
Start: 2024-02-23 | End: 2024-02-28

## 2024-02-23 RX ORDER — CETIRIZINE HCL 1 MG/ML
10 SOLUTION, ORAL ORAL DAILY
COMMUNITY
Start: 2023-09-26 | End: 2024-09-25

## 2024-02-23 RX ORDER — TEMAZEPAM 15 MG/1
15 CAPSULE ORAL ONCE
COMMUNITY
Start: 2024-02-19

## 2024-02-24 NOTE — PROGRESS NOTES
[facility]        NAME: Tameka Alvarado is a 28 y.o. female  : 1995    MRN: [id number]  DATE: 2024  TIME: 7:45 PM    Assessment and Plan   Upper respiratory tract infection, unspecified type [J06.9]  1. Upper respiratory tract infection, unspecified type  ofloxacin (OCUFLOX) 0.3 % ophthalmic solution            Patient Instructions       Follow up with PCP in 3-5 days.  Proceed to  ER if symptoms worsen.    Chief Complaint     Chief Complaint   Patient presents with    Nasal Congestion    Eye Problem     Patient reports having nasal congestion, left eye irritation and sneezing more and more as the day go on.         History of Present Illness       28-year-old female presenting with watery left eye with increased itchiness.  She also reports having a runny nose and nasal congestion began this morning.  Denies any fevers or chills.        Review of Systems   Review of Systems   Constitutional: Negative.    HENT:  Positive for congestion.    Eyes: Negative.    Respiratory: Negative.     Cardiovascular: Negative.    Gastrointestinal: Negative.    Genitourinary: Negative.    Skin: Negative.    Allergic/Immunologic: Negative.    Neurological: Negative.    Hematological: Negative.    Psychiatric/Behavioral: Negative.           Current Medications       Current Outpatient Medications:     ofloxacin (OCUFLOX) 0.3 % ophthalmic solution, Administer 1 drop into the left eye 4 (four) times a day for 5 days, Disp: 5 mL, Rfl: 0    temazepam (RESTORIL) 15 mg capsule, Take 15 mg by mouth once, Disp: , Rfl:     ZyrTEC Allergy 10 MG tablet, Take 10 mg by mouth daily, Disp: , Rfl:     acetaminophen (TYLENOL) 325 mg tablet, , Disp: , Rfl:     benztropine (COGENTIN) 1 mg tablet, Take 1 tablet (1 mg total) by mouth 2 (two) times a day At 9am and 6pm, Disp: 60 tablet, Rfl: 0    citalopram (CeleXA) 10 mg tablet, , Disp: , Rfl:     clotrimazole-betamethasone (LOTRISONE) 1-0.05 % cream, Apply bid for up to 14 days,  Disp: , Rfl:     divalproex sodium (DEPAKOTE ER) 250 mg 24 hr tablet, Take 3 tablets (750 mg total) by mouth daily at bedtime, Disp: 90 tablet, Rfl: 0    famotidine (PEPCID) 20 mg tablet, Take 1 tablet (20 mg total) by mouth 2 (two) times a day Before breakfast and dinner (Patient not taking: Reported on 9/3/2023), Disp: 60 tablet, Rfl: 0    levothyroxine 25 mcg tablet, Take 25 mcg by mouth daily, Disp: , Rfl:     loratadine (CLARITIN) 10 mg tablet, Take 1 tablet (10 mg total) by mouth daily At 9am (Patient not taking: Reported on 9/3/2023), Disp: 30 tablet, Rfl: 0    LORazepam (ATIVAN) 1 mg tablet, Take 1 tablet (1 mg total) by mouth every 8 (eight) hours as needed for anxiety, Disp: 30 tablet, Rfl: 0    Melatonin 5 MG TABS, , Disp: , Rfl:     polyethylene glycol (GLYCOLAX) 17 GM/SCOOP powder, Take 17 g by mouth daily Start 1 capful daily. Take this dose x 3 days. If your symptoms are improved, great! Continue this dose daily.  If you have diarrhea (stools are loose, associated with accidents, or urgency) with this dose, cut down to 1/2 capful daily. Continue to titrate down until you find the dose for you. This might be 1 tbsp daily. Wait 3 days before making a change.  If you have continued constipation with 1 capful daily, you may need a higher dose. Start with 1.5 capfuls daily and titrate upward. Eg might need 1 capful twice daily. There is no maximum dose, whatever works for you. Again, wait 3 days before making a change., Disp: 1530 g, Rfl: 0    risperiDONE (RisperDAL) 1 mg tablet, , Disp: , Rfl:     risperiDONE (RisperDAL) 3 mg tablet, Take 1 tablet (3 mg total) by mouth daily at bedtime, Disp: 30 tablet, Rfl: 0    senna (SENOKOT) 8.6 MG tablet, Take 1 tablet by mouth daily, Disp: , Rfl:     senna-docusate sodium (SENOKOT-S) 8.6-50 mg per tablet, Take 1 tablet by mouth daily, Disp: , Rfl:     traZODone (DESYREL) 100 mg tablet, , Disp: , Rfl:     Tri-Lo-Sprintec 0.18/0.215/0.25 MG-25 MCG per tablet, , Disp:  , Rfl:     Current Allergies     Allergies as of 02/23/2024 - Reviewed 02/23/2024   Allergen Reaction Noted    Eggs or egg-derived products - food allergy  06/12/2019    Fdc yellow [yellow dye - food allergy]  04/07/2015    Orange juice [orange oil - food allergy] Irritability 11/12/2019    Pollen extract Other (See Comments) 04/01/2021            The following portions of the patient's history were reviewed and updated as appropriate: allergies, current medications, past family history, past medical history, past social history, past surgical history and problem list.     Past Medical History:   Diagnosis Date    Adjustment disorder     Anxiety     Autism spectrum disorder     Bipolar disorder (HCC)     Depression     Fracture of skull (HCC)     Obsessive-compulsive disorder     Oppositional defiant disorder     Seizures (HCC)     Subarachnoid hemorrhage (HCC)     Traumatic brain injury (HCC)        No past surgical history on file.    Family History   Adopted: Yes   Family history unknown: Yes         Medications have been verified.        Objective   /70   Pulse 98   Temp 98.8 °F (37.1 °C) (Tympanic)   Resp 18   Wt 84.8 kg (187 lb)   SpO2 98%   BMI 32.10 kg/m²   No LMP recorded.       Physical Exam     Physical Exam  Vitals and nursing note reviewed.   Constitutional:       Appearance: She is well-developed.   HENT:      Head: Normocephalic.      Nose: Nose normal.   Eyes:      General:         Left eye: Discharge present.     Pupils: Pupils are equal, round, and reactive to light.   Cardiovascular:      Rate and Rhythm: Normal rate and regular rhythm.   Pulmonary:      Effort: Pulmonary effort is normal.   Abdominal:      General: Abdomen is flat.   Musculoskeletal:         General: Normal range of motion.      Cervical back: Normal range of motion.   Skin:     General: Skin is warm and dry.   Neurological:      Mental Status: She is alert and oriented to person, place, and time.

## 2025-03-10 ENCOUNTER — HOSPITAL ENCOUNTER (EMERGENCY)
Facility: HOSPITAL | Age: 30
End: 2025-03-10
Attending: STUDENT IN AN ORGANIZED HEALTH CARE EDUCATION/TRAINING PROGRAM
Payer: COMMERCIAL

## 2025-03-10 ENCOUNTER — HOSPITAL ENCOUNTER (INPATIENT)
Facility: HOSPITAL | Age: 30
LOS: 15 days | Discharge: HOME | DRG: 885 | End: 2025-03-25
Attending: PSYCHIATRY & NEUROLOGY | Admitting: PSYCHIATRY & NEUROLOGY
Payer: COMMERCIAL

## 2025-03-10 VITALS
OXYGEN SATURATION: 99 % | HEIGHT: 64 IN | RESPIRATION RATE: 18 BRPM | WEIGHT: 182.32 LBS | BODY MASS INDEX: 31.13 KG/M2 | TEMPERATURE: 98.5 F | HEART RATE: 98 BPM | DIASTOLIC BLOOD PRESSURE: 67 MMHG | SYSTOLIC BLOOD PRESSURE: 126 MMHG

## 2025-03-10 DIAGNOSIS — R46.89 AGGRESSIVE BEHAVIOR: Primary | ICD-10-CM

## 2025-03-10 DIAGNOSIS — F41.1 GAD (GENERALIZED ANXIETY DISORDER): Chronic | ICD-10-CM

## 2025-03-10 DIAGNOSIS — E53.8 VITAMIN B12 DEFICIENCY: ICD-10-CM

## 2025-03-10 DIAGNOSIS — N91.2 AMENORRHEA: ICD-10-CM

## 2025-03-10 DIAGNOSIS — F31.4 BIPOLAR I DISORDER, MOST RECENT EPISODE DEPRESSED, SEVERE WITHOUT PSYCHOTIC FEATURES (HCC): Primary | Chronic | ICD-10-CM

## 2025-03-10 DIAGNOSIS — G47.09 OTHER INSOMNIA: ICD-10-CM

## 2025-03-10 DIAGNOSIS — K21.9 GASTROESOPHAGEAL REFLUX DISEASE WITHOUT ESOPHAGITIS: ICD-10-CM

## 2025-03-10 DIAGNOSIS — G25.9 EXTRAPYRAMIDAL REACTION: ICD-10-CM

## 2025-03-10 DIAGNOSIS — J30.2 SEASONAL ALLERGIES: ICD-10-CM

## 2025-03-10 DIAGNOSIS — R46.89 AGGRESSIVE BEHAVIOR: ICD-10-CM

## 2025-03-10 DIAGNOSIS — K59.09 OTHER CONSTIPATION: ICD-10-CM

## 2025-03-10 DIAGNOSIS — E03.9 HYPOTHYROIDISM, UNSPECIFIED TYPE: ICD-10-CM

## 2025-03-10 DIAGNOSIS — E55.9 VITAMIN D DEFICIENCY: ICD-10-CM

## 2025-03-10 LAB
AMPHETAMINES SERPL QL SCN: NEGATIVE
BARBITURATES UR QL: NEGATIVE
BENZODIAZ UR QL: NEGATIVE
COCAINE UR QL: NEGATIVE
ETHANOL EXG-MCNC: 0 MG/DL
EXT PREGNANCY TEST URINE: NEGATIVE
EXT. CONTROL: NORMAL
FENTANYL UR QL SCN: NEGATIVE
HYDROCODONE UR QL SCN: NEGATIVE
METHADONE UR QL: NEGATIVE
OPIATES UR QL SCN: NEGATIVE
OXYCODONE+OXYMORPHONE UR QL SCN: NEGATIVE
PCP UR QL: NEGATIVE
THC UR QL: NEGATIVE

## 2025-03-10 PROCEDURE — 81025 URINE PREGNANCY TEST: CPT | Performed by: STUDENT IN AN ORGANIZED HEALTH CARE EDUCATION/TRAINING PROGRAM

## 2025-03-10 PROCEDURE — 93005 ELECTROCARDIOGRAM TRACING: CPT

## 2025-03-10 PROCEDURE — 99285 EMERGENCY DEPT VISIT HI MDM: CPT | Performed by: STUDENT IN AN ORGANIZED HEALTH CARE EDUCATION/TRAINING PROGRAM

## 2025-03-10 PROCEDURE — 80307 DRUG TEST PRSMV CHEM ANLYZR: CPT | Performed by: STUDENT IN AN ORGANIZED HEALTH CARE EDUCATION/TRAINING PROGRAM

## 2025-03-10 PROCEDURE — 82075 ASSAY OF BREATH ETHANOL: CPT | Performed by: STUDENT IN AN ORGANIZED HEALTH CARE EDUCATION/TRAINING PROGRAM

## 2025-03-10 PROCEDURE — 99285 EMERGENCY DEPT VISIT HI MDM: CPT

## 2025-03-10 RX ORDER — BENZTROPINE MESYLATE 1 MG/ML
1 INJECTION, SOLUTION INTRAMUSCULAR; INTRAVENOUS
Status: DISCONTINUED | OUTPATIENT
Start: 2025-03-10 | End: 2025-03-25 | Stop reason: HOSPADM

## 2025-03-10 RX ORDER — IBUPROFEN 400 MG/1
400 TABLET, FILM COATED ORAL EVERY 4 HOURS PRN
Status: DISCONTINUED | OUTPATIENT
Start: 2025-03-10 | End: 2025-03-25 | Stop reason: HOSPADM

## 2025-03-10 RX ORDER — IBUPROFEN 400 MG/1
800 TABLET, FILM COATED ORAL EVERY 8 HOURS PRN
Status: CANCELLED | OUTPATIENT
Start: 2025-03-10

## 2025-03-10 RX ORDER — HALOPERIDOL 5 MG/ML
5 INJECTION INTRAMUSCULAR
Status: DISCONTINUED | OUTPATIENT
Start: 2025-03-10 | End: 2025-03-25 | Stop reason: HOSPADM

## 2025-03-10 RX ORDER — RISPERIDONE 2 MG/1
2 TABLET ORAL ONCE
Status: COMPLETED | OUTPATIENT
Start: 2025-03-10 | End: 2025-03-10

## 2025-03-10 RX ORDER — BENZTROPINE MESYLATE 1 MG/ML
1 INJECTION, SOLUTION INTRAMUSCULAR; INTRAVENOUS
Status: CANCELLED | OUTPATIENT
Start: 2025-03-10

## 2025-03-10 RX ORDER — HALOPERIDOL 5 MG/ML
5 INJECTION INTRAMUSCULAR
Status: CANCELLED | OUTPATIENT
Start: 2025-03-10

## 2025-03-10 RX ORDER — LORAZEPAM 2 MG/ML
1 INJECTION INTRAMUSCULAR
Status: DISCONTINUED | OUTPATIENT
Start: 2025-03-10 | End: 2025-03-25 | Stop reason: HOSPADM

## 2025-03-10 RX ORDER — HALOPERIDOL 1 MG/1
1 TABLET ORAL EVERY 6 HOURS PRN
Status: DISCONTINUED | OUTPATIENT
Start: 2025-03-10 | End: 2025-03-25 | Stop reason: HOSPADM

## 2025-03-10 RX ORDER — BENZTROPINE MESYLATE 1 MG/ML
0.5 INJECTION, SOLUTION INTRAMUSCULAR; INTRAVENOUS
Status: DISCONTINUED | OUTPATIENT
Start: 2025-03-10 | End: 2025-03-25 | Stop reason: HOSPADM

## 2025-03-10 RX ORDER — LEVOTHYROXINE SODIUM 25 UG/1
25 TABLET ORAL DAILY
Status: CANCELLED | OUTPATIENT
Start: 2025-03-11

## 2025-03-10 RX ORDER — LORAZEPAM 2 MG/ML
2 INJECTION INTRAMUSCULAR
Status: CANCELLED | OUTPATIENT
Start: 2025-03-10

## 2025-03-10 RX ORDER — CITALOPRAM HYDROBROMIDE 20 MG/1
20 TABLET ORAL DAILY
Status: DISCONTINUED | OUTPATIENT
Start: 2025-03-11 | End: 2025-03-20

## 2025-03-10 RX ORDER — HYDROXYZINE HYDROCHLORIDE 25 MG/1
50 TABLET, FILM COATED ORAL
Status: CANCELLED | OUTPATIENT
Start: 2025-03-10

## 2025-03-10 RX ORDER — IBUPROFEN 400 MG/1
400 TABLET, FILM COATED ORAL EVERY 4 HOURS PRN
Status: CANCELLED | OUTPATIENT
Start: 2025-03-10

## 2025-03-10 RX ORDER — PROPRANOLOL HCL 20 MG
10 TABLET ORAL EVERY 8 HOURS PRN
Status: CANCELLED | OUTPATIENT
Start: 2025-03-10

## 2025-03-10 RX ORDER — HALOPERIDOL 5 MG/ML
2.5 INJECTION INTRAMUSCULAR
Status: DISCONTINUED | OUTPATIENT
Start: 2025-03-10 | End: 2025-03-25 | Stop reason: HOSPADM

## 2025-03-10 RX ORDER — TRAZODONE HYDROCHLORIDE 50 MG/1
50 TABLET ORAL
Status: DISCONTINUED | OUTPATIENT
Start: 2025-03-10 | End: 2025-03-25 | Stop reason: HOSPADM

## 2025-03-10 RX ORDER — CITALOPRAM HYDROBROMIDE 20 MG/1
20 TABLET ORAL DAILY
Status: CANCELLED | OUTPATIENT
Start: 2025-03-11

## 2025-03-10 RX ORDER — IBUPROFEN 600 MG/1
600 TABLET, FILM COATED ORAL EVERY 6 HOURS PRN
Status: CANCELLED | OUTPATIENT
Start: 2025-03-10

## 2025-03-10 RX ORDER — AMOXICILLIN 250 MG
1 CAPSULE ORAL DAILY PRN
Status: DISCONTINUED | OUTPATIENT
Start: 2025-03-10 | End: 2025-03-25 | Stop reason: HOSPADM

## 2025-03-10 RX ORDER — HALOPERIDOL 10 MG/1
5 TABLET ORAL
Status: CANCELLED | OUTPATIENT
Start: 2025-03-10

## 2025-03-10 RX ORDER — LORAZEPAM 2 MG/ML
2 INJECTION INTRAMUSCULAR
Status: DISCONTINUED | OUTPATIENT
Start: 2025-03-10 | End: 2025-03-25 | Stop reason: HOSPADM

## 2025-03-10 RX ORDER — BENZTROPINE MESYLATE 1 MG/ML
0.5 INJECTION, SOLUTION INTRAMUSCULAR; INTRAVENOUS
Status: CANCELLED | OUTPATIENT
Start: 2025-03-10

## 2025-03-10 RX ORDER — AMOXICILLIN 250 MG
1 CAPSULE ORAL DAILY PRN
Status: CANCELLED | OUTPATIENT
Start: 2025-03-10

## 2025-03-10 RX ORDER — SENNOSIDES 8.6 MG
1 TABLET ORAL DAILY
Status: CANCELLED | OUTPATIENT
Start: 2025-03-11

## 2025-03-10 RX ORDER — HYDROXYZINE HYDROCHLORIDE 25 MG/1
25 TABLET, FILM COATED ORAL
Status: CANCELLED | OUTPATIENT
Start: 2025-03-10

## 2025-03-10 RX ORDER — TRAZODONE HYDROCHLORIDE 50 MG/1
50 TABLET ORAL
Status: CANCELLED | OUTPATIENT
Start: 2025-03-10

## 2025-03-10 RX ORDER — HALOPERIDOL 2 MG/1
1 TABLET ORAL EVERY 6 HOURS PRN
Status: CANCELLED | OUTPATIENT
Start: 2025-03-10

## 2025-03-10 RX ORDER — SENNOSIDES 8.6 MG
1 TABLET ORAL DAILY
Status: DISCONTINUED | OUTPATIENT
Start: 2025-03-11 | End: 2025-03-21

## 2025-03-10 RX ORDER — IBUPROFEN 600 MG/1
600 TABLET, FILM COATED ORAL EVERY 6 HOURS PRN
Status: DISCONTINUED | OUTPATIENT
Start: 2025-03-10 | End: 2025-03-25 | Stop reason: HOSPADM

## 2025-03-10 RX ORDER — LEVOTHYROXINE SODIUM 25 UG/1
25 TABLET ORAL DAILY
Status: DISCONTINUED | OUTPATIENT
Start: 2025-03-11 | End: 2025-03-25 | Stop reason: HOSPADM

## 2025-03-10 RX ORDER — BENZTROPINE MESYLATE 1 MG/1
1 TABLET ORAL
Status: DISCONTINUED | OUTPATIENT
Start: 2025-03-10 | End: 2025-03-25 | Stop reason: HOSPADM

## 2025-03-10 RX ORDER — LORAZEPAM 2 MG/ML
2 INJECTION INTRAMUSCULAR EVERY 6 HOURS PRN
Status: DISCONTINUED | OUTPATIENT
Start: 2025-03-10 | End: 2025-03-25 | Stop reason: HOSPADM

## 2025-03-10 RX ORDER — BENZTROPINE MESYLATE 1 MG/1
1 TABLET ORAL
Status: CANCELLED | OUTPATIENT
Start: 2025-03-10

## 2025-03-10 RX ORDER — MAGNESIUM HYDROXIDE/ALUMINUM HYDROXICE/SIMETHICONE 120; 1200; 1200 MG/30ML; MG/30ML; MG/30ML
30 SUSPENSION ORAL EVERY 4 HOURS PRN
Status: CANCELLED | OUTPATIENT
Start: 2025-03-10

## 2025-03-10 RX ORDER — IBUPROFEN 400 MG/1
800 TABLET, FILM COATED ORAL EVERY 8 HOURS PRN
Status: DISCONTINUED | OUTPATIENT
Start: 2025-03-10 | End: 2025-03-25 | Stop reason: HOSPADM

## 2025-03-10 RX ORDER — DIPHENHYDRAMINE HYDROCHLORIDE 50 MG/ML
50 INJECTION INTRAMUSCULAR; INTRAVENOUS EVERY 6 HOURS PRN
Status: CANCELLED | OUTPATIENT
Start: 2025-03-10

## 2025-03-10 RX ORDER — DIPHENHYDRAMINE HYDROCHLORIDE 50 MG/ML
50 INJECTION, SOLUTION INTRAMUSCULAR; INTRAVENOUS EVERY 6 HOURS PRN
Status: DISCONTINUED | OUTPATIENT
Start: 2025-03-10 | End: 2025-03-25 | Stop reason: HOSPADM

## 2025-03-10 RX ORDER — LORAZEPAM 2 MG/ML
2 INJECTION INTRAMUSCULAR EVERY 6 HOURS PRN
Status: CANCELLED | OUTPATIENT
Start: 2025-03-10

## 2025-03-10 RX ORDER — HYDROXYZINE HYDROCHLORIDE 25 MG/1
25 TABLET, FILM COATED ORAL
Status: DISCONTINUED | OUTPATIENT
Start: 2025-03-10 | End: 2025-03-25 | Stop reason: HOSPADM

## 2025-03-10 RX ORDER — POLYETHYLENE GLYCOL 3350 17 G/17G
17 POWDER, FOR SOLUTION ORAL DAILY PRN
Status: CANCELLED | OUTPATIENT
Start: 2025-03-10

## 2025-03-10 RX ORDER — HYDROXYZINE HYDROCHLORIDE 25 MG/1
100 TABLET, FILM COATED ORAL
Status: CANCELLED | OUTPATIENT
Start: 2025-03-10

## 2025-03-10 RX ORDER — LORAZEPAM 2 MG/ML
1 INJECTION INTRAMUSCULAR
Status: CANCELLED | OUTPATIENT
Start: 2025-03-10

## 2025-03-10 RX ORDER — HALOPERIDOL 5 MG/1
5 TABLET ORAL
Status: DISCONTINUED | OUTPATIENT
Start: 2025-03-10 | End: 2025-03-25 | Stop reason: HOSPADM

## 2025-03-10 RX ORDER — HYDROXYZINE HYDROCHLORIDE 50 MG/1
100 TABLET, FILM COATED ORAL
Status: DISCONTINUED | OUTPATIENT
Start: 2025-03-10 | End: 2025-03-25 | Stop reason: HOSPADM

## 2025-03-10 RX ORDER — BISACODYL 10 MG
10 SUPPOSITORY, RECTAL RECTAL DAILY PRN
Status: CANCELLED | OUTPATIENT
Start: 2025-03-10

## 2025-03-10 RX ORDER — HYDROXYZINE HYDROCHLORIDE 50 MG/1
50 TABLET, FILM COATED ORAL
Status: DISCONTINUED | OUTPATIENT
Start: 2025-03-10 | End: 2025-03-25 | Stop reason: HOSPADM

## 2025-03-10 RX ORDER — PROPRANOLOL HYDROCHLORIDE 10 MG/1
10 TABLET ORAL EVERY 8 HOURS PRN
Status: DISCONTINUED | OUTPATIENT
Start: 2025-03-10 | End: 2025-03-25 | Stop reason: HOSPADM

## 2025-03-10 RX ORDER — HALOPERIDOL 5 MG/ML
2.5 INJECTION INTRAMUSCULAR
Status: CANCELLED | OUTPATIENT
Start: 2025-03-10

## 2025-03-10 RX ORDER — MAGNESIUM HYDROXIDE/ALUMINUM HYDROXICE/SIMETHICONE 120; 1200; 1200 MG/30ML; MG/30ML; MG/30ML
30 SUSPENSION ORAL EVERY 4 HOURS PRN
Status: DISCONTINUED | OUTPATIENT
Start: 2025-03-10 | End: 2025-03-25 | Stop reason: HOSPADM

## 2025-03-10 RX ORDER — BISACODYL 10 MG
10 SUPPOSITORY, RECTAL RECTAL DAILY PRN
Status: DISCONTINUED | OUTPATIENT
Start: 2025-03-10 | End: 2025-03-12

## 2025-03-10 RX ORDER — POLYETHYLENE GLYCOL 3350 17 G/17G
17 POWDER, FOR SOLUTION ORAL DAILY PRN
Status: DISCONTINUED | OUTPATIENT
Start: 2025-03-10 | End: 2025-03-12

## 2025-03-10 RX ADMIN — TRAZODONE HYDROCHLORIDE 50 MG: 50 TABLET ORAL at 21:22

## 2025-03-10 RX ADMIN — DIVALPROEX SODIUM 750 MG: 500 TABLET, EXTENDED RELEASE ORAL at 21:22

## 2025-03-10 RX ADMIN — IBUPROFEN 400 MG: 400 TABLET, FILM COATED ORAL at 21:58

## 2025-03-10 RX ADMIN — RISPERIDONE 2 MG: 2 TABLET, FILM COATED ORAL at 16:58

## 2025-03-10 RX ADMIN — Medication 3 MG: at 21:22

## 2025-03-10 NOTE — LETTER
ID # [id number]    [facility] # [id number]    [facility] Phone # [phone]    DISCHARGE SUMMARY
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
## 2025-03-10 NOTE — ED NOTES
201 was signed by the pt and the Provider.  Rights and Restrictions were explained.      Pt would like to be referred to [facility] only.

## 2025-03-10 NOTE — EMTALA/ACUTE CARE TRANSFER
[facility] EMERGENCY DEPARTMENT  [address] [facility] [address]  Dept: [phone]      EMTALA TRANSFER CONSENT    NAME Tameka Alvarado                                         1995                              MRN [id number]    I have been informed of my rights regarding examination, treatment, and transfer   by Dr. Marleny Duncan DO    Benefits: Specialized equipment and/or services available at the receiving facility (Include comment)________________________ (inpatient psychiatric)    Risks: Potential for delay in receiving treatment, Potential deterioration of medical condition, Increased discomfort during transfer, Possible worsening of condition or death during transfer      Consent for Transfer:  I acknowledge that my medical condition has been evaluated and explained to me by the emergency department physician or other qualified medical person and/or my attending physician, who has recommended that I be transferred to the service of  Accepting Physician: Dr Katherine MD at Accepting Facility Name, City & State : [facility]. The above potential benefits of such transfer, the potential risks associated with such transfer, and the probable risks of not being transferred have been explained to me, and I fully understand them.  The doctor has explained that, in my case, the benefits of transfer outweigh the risks.  I agree to be transferred.    I authorize the performance of emergency medical procedures and treatments upon me in both transit and upon arrival at the receiving facility.  Additionally, I authorize the release of any and all medical records to the receiving facility and request they be transported with me, if possible.  I understand that the safest mode of transportation during a medical emergency is an ambulance and that the Hospital advocates the use of this mode of transport. Risks of traveling to the receiving facility by car,  including absence of medical control, life sustaining equipment, such as oxygen, and medical personnel has been explained to me and I fully understand them.    (ALIRIO CORRECT BOX BELOW)  [  ]  I consent to the stated transfer and to be transported by ambulance/helicopter.  [  ]  I consent to the stated transfer, but refuse transportation by ambulance and accept full responsibility for my transportation by car.  I understand the risks of non-ambulance transfers and I exonerate the Hospital and its staff from any deterioration in my condition that results from this refusal.    X___________________________________________    DATE  03/10/25  TIME________  Signature of patient or legally responsible individual signing on patient behalf           RELATIONSHIP TO PATIENT_________________________          Provider Certification    NAME Tameka Alvarado                                         1995                              MRN [id number]    A medical screening exam was performed on the above named patient.  Based on the examination:    Condition Necessitating Transfer The encounter diagnosis was Aggressive behavior.    Patient Condition: The patient has been stabilized such that within reasonable medical probability, no material deterioration of the patient condition or the condition of the unborn child(dino) is likely to result from the transfer    Reason for Transfer: Level of Care needed not available at this facility    Transfer Requirements: Facility [address]   Space available and qualified personnel available for treatment as acknowledged by Calvin  Agreed to accept transfer and to provide appropriate medical treatment as acknowledged by       Dr Katherine MD  Appropriate medical records of the examination and treatment of the patient are provided at the time of transfer   STAFF INITIAL WHEN COMPLETED _______  Transfer will be performed by qualified personnel from    and appropriate transfer  equipment as required, including the use of necessary and appropriate life support measures.    Provider Certification: I have examined the patient and explained the following risks and benefits of being transferred/refusing transfer to the patient/family:         Based on these reasonable risks and benefits to the patient and/or the unborn child(dino), and based upon the information available at the time of the patient’s examination, I certify that the medical benefits reasonably to be expected from the provision of appropriate medical treatments at another medical facility outweigh the increasing risks, if any, to the individual’s medical condition, and in the case of labor to the unborn child, from effecting the transfer.    X____________________________________________ DATE 03/10/25        TIME_______      ORIGINAL - SEND TO MEDICAL RECORDS   COPY - SEND WITH PATIENT DURING TRANSFER

## 2025-03-10 NOTE — ED NOTES
Patient is accepted at [facility].  Patient is accepted by Dr.Qureshi abdelrahman Vernon.     Transportation is arranged with [facility].     Transportation is scheduled for [facility]  Patient may go to the floor at .  1945        Nurse report is to be called to [phone] prior to patient transfer.
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
## 2025-03-10 NOTE — ED NOTES
"Olya and Dr Duncan had a phone conversation with [facility] staff Celina.  Dr Duncan had pt discharged to go back to [facility] due to no criteria for admission for  Behavorial Health. Celina disagreed with the Provider's treatment plan and was refusing to take pt back to [facility]. Olya concurred  with the Provider Assessment there was no criteria.  Celina stated the pt was aggressive towards staff and did not feel the client was safe to return to the program. Client reported to the Provider no SI or HI and had regrets on what had happened and was just overwhelmed. Celina stated once again th ept could not return and felt she was unsafe to others. Olya asked the pt had Psychiatry at [facility] who could complete an evaluation and many due a medication adjustment.  Celina insisted the pt needs to go inpatient for mental health treatment. Celina reported the pt was \"tearing her Face apart with her nails\"  Provider had seen one faint scratch on the Rt side of the pt's face needing no medical attention.  Crisis told Celina the pt did not report any SI or HI and had low criteria for inpatient treatment. Olya and the Provider felt safe discharging the pt back to [facility] due to all the staff support and supervision the could provide for the pt. Crisis recommended for Celina to come in and petition a 302 for involuntary treatment.  Celina stated the pt told her she would sign a 201.  The pt heard Celina on the phone and started reporting to Crisis and the Provider she was not feeling safe and felt if she would go back she might hurt someone.  Pt stated she would sign a 201.  Pt did not report any of these thoughts and feelings to the Provider during the Provider's Assessment. Since the pt reported not feeling safe,  Crisis told Celina the pt will be offered to sign a 201 for inpatient treatment.   "

## 2025-03-11 PROBLEM — F31.4 BIPOLAR I DISORDER, MOST RECENT EPISODE DEPRESSED, SEVERE WITHOUT PSYCHOTIC FEATURES (HCC): Chronic | Status: ACTIVE | Noted: 2019-06-13

## 2025-03-11 PROBLEM — E55.9 VITAMIN D DEFICIENCY: Status: ACTIVE | Noted: 2025-03-11

## 2025-03-11 PROBLEM — E53.8 VITAMIN B12 DEFICIENCY: Status: ACTIVE | Noted: 2025-03-11

## 2025-03-11 PROBLEM — F31.4 BIPOLAR I DISORDER, MOST RECENT EPISODE DEPRESSED, SEVERE WITHOUT PSYCHOTIC FEATURES (HCC): Chronic | Status: ACTIVE | Noted: 2019-07-12

## 2025-03-11 PROBLEM — Z00.8 MEDICAL CLEARANCE FOR PSYCHIATRIC ADMISSION: Status: ACTIVE | Noted: 2025-03-11

## 2025-03-11 LAB
25(OH)D3 SERPL-MCNC: 24.9 NG/ML (ref 30–100)
ALBUMIN SERPL BCG-MCNC: 3.8 G/DL (ref 3.5–5)
ALP SERPL-CCNC: 61 U/L (ref 34–104)
ALT SERPL W P-5'-P-CCNC: 28 U/L (ref 7–52)
ANION GAP SERPL CALCULATED.3IONS-SCNC: 7 MMOL/L (ref 4–13)
AST SERPL W P-5'-P-CCNC: 22 U/L (ref 13–39)
ATRIAL RATE: 77 BPM
BASOPHILS # BLD AUTO: 0.03 THOUSANDS/ÂΜL (ref 0–0.1)
BASOPHILS NFR BLD AUTO: 1 % (ref 0–1)
BILIRUB SERPL-MCNC: 0.37 MG/DL (ref 0.2–1)
BUN SERPL-MCNC: 13 MG/DL (ref 5–25)
CALCIUM SERPL-MCNC: 9 MG/DL (ref 8.4–10.2)
CHLORIDE SERPL-SCNC: 105 MMOL/L (ref 96–108)
CHOLEST SERPL-MCNC: 153 MG/DL (ref ?–200)
CO2 SERPL-SCNC: 28 MMOL/L (ref 21–32)
CREAT SERPL-MCNC: 0.74 MG/DL (ref 0.6–1.3)
EOSINOPHIL # BLD AUTO: 0.03 THOUSAND/ÂΜL (ref 0–0.61)
EOSINOPHIL NFR BLD AUTO: 1 % (ref 0–6)
ERYTHROCYTE [DISTWIDTH] IN BLOOD BY AUTOMATED COUNT: 12.6 % (ref 11.6–15.1)
EST. AVERAGE GLUCOSE BLD GHB EST-MCNC: 111 MG/DL
FOLATE SERPL-MCNC: 13.8 NG/ML
GFR SERPL CREATININE-BSD FRML MDRD: 107 ML/MIN/1.73SQ M
GLUCOSE P FAST SERPL-MCNC: 101 MG/DL (ref 65–99)
GLUCOSE SERPL-MCNC: 101 MG/DL (ref 65–140)
HBA1C MFR BLD: 5.5 %
HCG SERPL QL: NEGATIVE
HCT VFR BLD AUTO: 40.2 % (ref 34.8–46.1)
HDLC SERPL-MCNC: 61 MG/DL
HGB BLD-MCNC: 13.2 G/DL (ref 11.5–15.4)
IMM GRANULOCYTES # BLD AUTO: 0.01 THOUSAND/UL (ref 0–0.2)
IMM GRANULOCYTES NFR BLD AUTO: 0 % (ref 0–2)
LDLC SERPL CALC-MCNC: 65 MG/DL (ref 0–100)
LYMPHOCYTES # BLD AUTO: 2.46 THOUSANDS/ÂΜL (ref 0.6–4.47)
LYMPHOCYTES NFR BLD AUTO: 46 % (ref 14–44)
MCH RBC QN AUTO: 31.5 PG (ref 26.8–34.3)
MCHC RBC AUTO-ENTMCNC: 32.8 G/DL (ref 31.4–37.4)
MCV RBC AUTO: 96 FL (ref 82–98)
MONOCYTES # BLD AUTO: 0.24 THOUSAND/ÂΜL (ref 0.17–1.22)
MONOCYTES NFR BLD AUTO: 5 % (ref 4–12)
NEUTROPHILS # BLD AUTO: 2.55 THOUSANDS/ÂΜL (ref 1.85–7.62)
NEUTS SEG NFR BLD AUTO: 47 % (ref 43–75)
NONHDLC SERPL-MCNC: 92 MG/DL
NRBC BLD AUTO-RTO: 0 /100 WBCS
P AXIS: 35 DEGREES
PLATELET # BLD AUTO: 164 THOUSANDS/UL (ref 149–390)
PMV BLD AUTO: 9.4 FL (ref 8.9–12.7)
POTASSIUM SERPL-SCNC: 4 MMOL/L (ref 3.5–5.3)
PR INTERVAL: 134 MS
PROT SERPL-MCNC: 6.2 G/DL (ref 6.4–8.4)
QRS AXIS: 52 DEGREES
QRSD INTERVAL: 74 MS
QT INTERVAL: 354 MS
QTC INTERVAL: 401 MS
RBC # BLD AUTO: 4.19 MILLION/UL (ref 3.81–5.12)
SODIUM SERPL-SCNC: 140 MMOL/L (ref 135–147)
T WAVE AXIS: 59 DEGREES
TREPONEMA PALLIDUM IGG+IGM AB [PRESENCE] IN SERUM OR PLASMA BY IMMUNOASSAY: NORMAL
TRIGL SERPL-MCNC: 135 MG/DL (ref ?–150)
TSH SERPL DL<=0.05 MIU/L-ACNC: 4.48 UIU/ML (ref 0.45–4.5)
VENTRICULAR RATE: 77 BPM
VIT B12 SERPL-MCNC: 277 PG/ML (ref 180–914)
WBC # BLD AUTO: 5.32 THOUSAND/UL (ref 4.31–10.16)

## 2025-03-11 PROCEDURE — 86780 TREPONEMA PALLIDUM: CPT | Performed by: PSYCHIATRY & NEUROLOGY

## 2025-03-11 PROCEDURE — 84443 ASSAY THYROID STIM HORMONE: CPT | Performed by: PSYCHIATRY & NEUROLOGY

## 2025-03-11 PROCEDURE — 84703 CHORIONIC GONADOTROPIN ASSAY: CPT | Performed by: PSYCHIATRY & NEUROLOGY

## 2025-03-11 PROCEDURE — 80053 COMPREHEN METABOLIC PANEL: CPT | Performed by: PSYCHIATRY & NEUROLOGY

## 2025-03-11 PROCEDURE — 82607 VITAMIN B-12: CPT | Performed by: PSYCHIATRY & NEUROLOGY

## 2025-03-11 PROCEDURE — 82306 VITAMIN D 25 HYDROXY: CPT | Performed by: PSYCHIATRY & NEUROLOGY

## 2025-03-11 PROCEDURE — 99222 1ST HOSP IP/OBS MODERATE 55: CPT | Performed by: NURSE PRACTITIONER

## 2025-03-11 PROCEDURE — 80061 LIPID PANEL: CPT | Performed by: PSYCHIATRY & NEUROLOGY

## 2025-03-11 PROCEDURE — 93010 ELECTROCARDIOGRAM REPORT: CPT | Performed by: INTERNAL MEDICINE

## 2025-03-11 PROCEDURE — 82746 ASSAY OF FOLIC ACID SERUM: CPT | Performed by: PSYCHIATRY & NEUROLOGY

## 2025-03-11 PROCEDURE — 99223 1ST HOSP IP/OBS HIGH 75: CPT | Performed by: PSYCHIATRY & NEUROLOGY

## 2025-03-11 PROCEDURE — 85025 COMPLETE CBC W/AUTO DIFF WBC: CPT | Performed by: PSYCHIATRY & NEUROLOGY

## 2025-03-11 PROCEDURE — 83036 HEMOGLOBIN GLYCOSYLATED A1C: CPT | Performed by: PSYCHIATRY & NEUROLOGY

## 2025-03-11 RX ORDER — RISPERIDONE 1 MG/1
1 TABLET ORAL 3 TIMES DAILY
Status: DISCONTINUED | OUTPATIENT
Start: 2025-03-11 | End: 2025-03-11

## 2025-03-11 RX ORDER — LORATADINE 10 MG/1
10 TABLET ORAL DAILY
Status: DISCONTINUED | OUTPATIENT
Start: 2025-03-11 | End: 2025-03-25 | Stop reason: HOSPADM

## 2025-03-11 RX ORDER — NORGESTIMATE AND ETHINYL ESTRADIOL 7DAYSX3 LO
1 KIT ORAL DAILY
Status: DISCONTINUED | OUTPATIENT
Start: 2025-03-11 | End: 2025-03-25 | Stop reason: HOSPADM

## 2025-03-11 RX ORDER — FAMOTIDINE 20 MG/1
20 TABLET, FILM COATED ORAL 2 TIMES DAILY PRN
Status: DISCONTINUED | OUTPATIENT
Start: 2025-03-11 | End: 2025-03-25 | Stop reason: HOSPADM

## 2025-03-11 RX ORDER — RISPERIDONE 2 MG/1
2 TABLET ORAL 3 TIMES DAILY
Status: DISCONTINUED | OUTPATIENT
Start: 2025-03-11 | End: 2025-03-22

## 2025-03-11 RX ORDER — TEMAZEPAM 15 MG/1
15 CAPSULE ORAL ONCE
Status: COMPLETED | OUTPATIENT
Start: 2025-03-11 | End: 2025-03-11

## 2025-03-11 RX ORDER — TRAZODONE HYDROCHLORIDE 100 MG/1
100 TABLET ORAL
Status: DISCONTINUED | OUTPATIENT
Start: 2025-03-11 | End: 2025-03-22

## 2025-03-11 RX ADMIN — RISPERIDONE 2 MG: 2 TABLET, FILM COATED ORAL at 15:48

## 2025-03-11 RX ADMIN — IBUPROFEN 600 MG: 600 TABLET, FILM COATED ORAL at 05:29

## 2025-03-11 RX ADMIN — RISPERIDONE 2 MG: 2 TABLET, FILM COATED ORAL at 10:26

## 2025-03-11 RX ADMIN — TRAZODONE HYDROCHLORIDE 100 MG: 100 TABLET ORAL at 21:23

## 2025-03-11 RX ADMIN — CYANOCOBALAMIN TAB 1000 MCG 1000 MCG: 1000 TAB at 14:00

## 2025-03-11 RX ADMIN — CITALOPRAM HYDROBROMIDE 20 MG: 20 TABLET ORAL at 08:16

## 2025-03-11 RX ADMIN — Medication 6 MG: at 21:22

## 2025-03-11 RX ADMIN — Medication 2000 UNITS: at 14:00

## 2025-03-11 RX ADMIN — RISPERIDONE 2 MG: 2 TABLET, FILM COATED ORAL at 21:23

## 2025-03-11 RX ADMIN — TEMAZEPAM 15 MG: 15 CAPSULE ORAL at 11:29

## 2025-03-11 RX ADMIN — LEVOTHYROXINE SODIUM 25 MCG: 0.03 TABLET ORAL at 08:16

## 2025-03-11 RX ADMIN — LORATADINE 10 MG: 10 TABLET ORAL at 11:29

## 2025-03-11 RX ADMIN — SENNOSIDES 8.6 MG: 8.6 TABLET, FILM COATED ORAL at 08:16

## 2025-03-11 RX ADMIN — DIVALPROEX SODIUM 750 MG: 500 TABLET, EXTENDED RELEASE ORAL at 21:23

## 2025-03-11 NOTE — TREATMENT PLAN
TREATMENT PLAN REVIEW - Behavioral Health Tamkea Alvarado 30 y.o. 1995 female MRN: [id number]    [facility] 3P BEHAVIORAL [facility] Room / Bed: [facility] 379/[facility] 379-01 Encounter: [id number]        Admit Date/Time:  3/10/2025  8:05 PM    Treatment Team:   MD Mansi Royal RN Innacience Rosado Danielle Merk Christina Smith Karissa Marie Kormandy, CRNP Grace Hambly Evangelia Raymond    Diagnosis: Principal Problem:    Bipolar I disorder, most recent episode depressed, severe without psychotic features ([facility])  Active Problems:    RADHA (generalized anxiety disorder)    Obsessive-compulsive disorder, unspecified    Autism spectrum disorder    Gastroesophageal reflux disease without esophagitis    Hypothyroidism    Medical clearance for psychiatric admission    Vitamin D deficiency    Vitamin B12 deficiency      Patient Strengths/Assets: negotiates basic needs, patient is on a voluntary commitment, stable housing      Patient Barriers/Limitations: chronic mental illness, difficulty adapting, limited insight    Short Term Goals: decrease in anxiety symptoms, decrease in suicidal thoughts, improvement in impulse control, tolerate medications, mood stabilization    Long Term Goals: improvement in anxiety, resolution of depressive symptoms, free of suicidal thoughts, no self abusive behavior, improved impulse control    Progress Towards Goals: restarting psychiatric medications as prescribed    Recommended Treatment: medication management, patient medication education, group therapy, milieu therapy, continued Behavioral Health psychiatric evaluation/assessment process     Treatment Frequency: daily medication monitoring, group and milieu therapy daily, monitoring through interdisciplinary rounds, monitoring through weekly patient care conferences    Expected Discharge Date: 9 days - 3/20/2025    Discharge Plan: referral for outpatient medication management with  a psychiatrist, return to previous living arrangement    Treatment Plan Created/Updated By: Love Stuart MD

## 2025-03-11 NOTE — ED NOTES
Insurance Authorization for admission:   Faxed to [facility].  Fax Number: [phone]        03 days approved.  Level of care: voluntary [facility] tx.  Review on 03/14/2025.   Authorization # [id number].        Fax Clinical information to request additional days.

## 2025-03-11 NOTE — NURSING NOTE
Medications reconciled with medication list sent from group [place] - Dr. Murphy made aware via SecureChat.

## 2025-03-11 NOTE — SOCIAL WORK
AQUILES reached out to [facility] ([phone]) to request pt's birth control to be dropped off to the unit per her request. Staff from [facility] stated that they will be by later this afternoon to drop off medication.

## 2025-03-11 NOTE — ED NOTES
Insurance Authorization for admission:   Clinicals faxed to [facility]   Fax: [phone]  Days- Pending  Auth: pending

## 2025-03-11 NOTE — NURSING NOTE
Pt denies SI, HI, AH and VH. Pt has no complaints or concerns. Pt is visible in the milieu and social with peers. Pt is calm and cooperative. Pt reached out to her mother to have her group home bring in clothing and her birth control. Medication and meal complaint.

## 2025-03-11 NOTE — PROGRESS NOTES
Admission Self Assessment form was completed, signed and given to this therapist.  Work focus areas as per form: Communication, anger management, knowledge about mental health and knowledge about medication
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
## 2025-03-11 NOTE — ASSESSMENT & PLAN NOTE
Admission labs: CBC, CMP, lipid panel, TSH acceptable  Vitals stable   UA not collected    UDS negative   EKG reveals NSR, 77 bpm   Patient is medically cleared for admission to [facility] and treatment of underlying psychiatric illness based on available results  Please contact [facility] with any questions or concerns

## 2025-03-11 NOTE — NURSING NOTE
"Pt c/o insomnia at this time, stating \"I need my trazodone to sleep\". PRN PO trazodone 50mg given @ 2122. 2222: Effective. Pt observed resting in bed at this time, no signs of discomfort.   "
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
## 2025-03-11 NOTE — ASSESSMENT & PLAN NOTE
Depressed mood, suicidal thoughts with a plan to walk into traffic. Contracts for safety in the hospital.    Current medications:  Continue Celexa 20 mg daily to help with depression and anxiety  Continue Depakote  mg at bedtime to help with mood stabilization  Check Depakote level tonight. Plan to adjust Depakote ER dosing based on results of Depakote level  Continue Melatonin and adjust dosing to 6 mg at bedtime to help with sleep  Restart Restoril 15 mg at bedtime also to help with sleep  Restart Trazodone 100 mg at bedtime also to help with sleep - Tameka states that she needs all 3 hypnotic medications (Trazodone, Restoril and Melatonin to sleep at night)  Restart Risperdal 2 mg tid to help with mood. Tameka reports compliance with medications and medication doses are confirmed with list from [facility]

## 2025-03-11 NOTE — PLAN OF CARE
Problem: SELF HARM/SUICIDALITY  Goal: Will have no self-injury during hospital stay  Description: INTERVENTIONS:  - Q 15 MINUTES: Routine safety checks  - Q WAKING SHIFT & PRN: Assess risk to determine if routine checks are adequate to maintain patient safety  - Encourage patient to participate actively in care by formulating a plan to combat response to suicidal ideation, identify supports and resources  Outcome: Not Progressing     Problem: DEPRESSION  Goal: Will be euthymic at discharge  Description: INTERVENTIONS:  - Administer medication as ordered  - Provide emotional support via 1:1 interaction with staff  - Encourage involvement in milieu/groups/activities  - Monitor for social isolation  Outcome: Not Progressing     Problem: BEHAVIOR  Goal: Pt/Family maintain appropriate behavior and adhere to behavioral management agreement, if implemented  Description: INTERVENTIONS:  - Assess the family dynamic   - Encourage verbalization of thoughts and concerns in a socially appropriate manner  - Assess patient/family's coping skills and non-compliant behavior (including use of illegal substances).  - Utilize positive, consistent limit setting strategies supporting safety of patient, staff and others  - Initiate consult with Case Management, Spiritual Care or other ancillary services as appropriate  - If a patient's/visitor's behavior jeopardizes the safety of the patient, staff, or others, refer to organization procedure.   - Notify Security of behavior or suspected illegal substances which indicate the need for search of the patient and/or belongings  - Encourage participation in the decision making process about a behavioral management agreement; implement if patient meets criteria  Outcome: Not Progressing     Problem: ANXIETY  Goal: Will report anxiety at manageable levels  Description: INTERVENTIONS:  - Administer medication as ordered  - Teach and encourage coping skills  - Provide emotional support  - Assess  patient/family for anxiety and ability to cope  Outcome: Not Progressing  Goal: By discharge: Patient will verbalize 2 strategies to deal with anxiety  Description: Interventions:  - Identify any obvious source/trigger to anxiety  - Staff will assist patient in applying identified coping technique/skills  - Encourage attendance of scheduled groups and activities  Outcome: Not Progressing     Problem: Risk for Violence/Aggression Toward Others  Goal: Treatment Goal: Refrain from acts of violence/aggression during length of stay, and demonstrate improved impulse control at the time of discharge  Outcome: Not Progressing  Goal: Verbalize thoughts and feelings  Description: Interventions:  - Assess and re-assess patient's level of risk, every waking shift  - Engage patient in 1:1 interactions, daily, for a minimum of 15 minutes   - Allow patient to express feelings and frustrations in a safe and non-threatening manner   - Establish rapport/trust with patient   Outcome: Not Progressing  Goal: Refrain from harming others  Outcome: Not Progressing  Goal: Refrain from destructive acts on the environment or property  Outcome: Not Progressing  Goal: Control angry outbursts  Description: Interventions:  - Monitor patient closely, per order  - Ensure early verbal de-escalation  - Monitor prn medication needs  - Set reasonable/therapeutic limits, outline behavioral expectations, and consequences   - Provide a non-threatening milieu, utilizing the least restrictive interventions   Outcome: Not Progressing  Goal: Attend and participate in unit activities, including therapeutic, recreational, and educational groups  Description: Interventions:  - Provide therapeutic and educational activities daily, encourage attendance and participation, and document same in the medical record   Outcome: Not Progressing  Goal: Identify appropriate positive anger management techniques  Description: Interventions:  - Offer anger management and  coping skills groups   - Staff will provide positive feedback for appropriate anger control  Outcome: Not Progressing

## 2025-03-12 LAB
BACTERIA UR QL AUTO: ABNORMAL /HPF
BILIRUB UR QL STRIP: NEGATIVE
CLARITY UR: CLEAR
COLOR UR: ABNORMAL
GLUCOSE UR STRIP-MCNC: NEGATIVE MG/DL
HGB UR QL STRIP.AUTO: NEGATIVE
KETONES UR STRIP-MCNC: NEGATIVE MG/DL
LEUKOCYTE ESTERASE UR QL STRIP: 500
NITRITE UR QL STRIP: NEGATIVE
NON-SQ EPI CELLS URNS QL MICRO: ABNORMAL /HPF
PH UR STRIP.AUTO: 7 [PH]
PROT UR STRIP-MCNC: NEGATIVE MG/DL
RBC #/AREA URNS AUTO: ABNORMAL /HPF
SP GR UR STRIP.AUTO: 1.01 (ref 1–1.04)
UROBILINOGEN UA: NEGATIVE MG/DL
WBC #/AREA URNS AUTO: ABNORMAL /HPF

## 2025-03-12 PROCEDURE — 99233 SBSQ HOSP IP/OBS HIGH 50: CPT | Performed by: PSYCHIATRY & NEUROLOGY

## 2025-03-12 PROCEDURE — 81001 URINALYSIS AUTO W/SCOPE: CPT | Performed by: PSYCHIATRY & NEUROLOGY

## 2025-03-12 RX ORDER — POLYETHYLENE GLYCOL 3350 17 G/17G
17 POWDER, FOR SOLUTION ORAL DAILY PRN
Status: DISCONTINUED | OUTPATIENT
Start: 2025-03-12 | End: 2025-03-25 | Stop reason: HOSPADM

## 2025-03-12 RX ORDER — BISACODYL 10 MG
10 SUPPOSITORY, RECTAL RECTAL DAILY PRN
Status: DISCONTINUED | OUTPATIENT
Start: 2025-03-12 | End: 2025-03-25 | Stop reason: HOSPADM

## 2025-03-12 RX ORDER — GABAPENTIN 100 MG/1
100 CAPSULE ORAL 3 TIMES DAILY
Status: DISCONTINUED | OUTPATIENT
Start: 2025-03-12 | End: 2025-03-19

## 2025-03-12 RX ADMIN — Medication 6 MG: at 21:25

## 2025-03-12 RX ADMIN — RISPERIDONE 2 MG: 2 TABLET, FILM COATED ORAL at 21:25

## 2025-03-12 RX ADMIN — CITALOPRAM HYDROBROMIDE 20 MG: 20 TABLET ORAL at 09:38

## 2025-03-12 RX ADMIN — Medication 2000 UNITS: at 09:38

## 2025-03-12 RX ADMIN — NORGESTIMATE AND ETHINYL ESTRADIOL 1 TABLET: KIT at 09:40

## 2025-03-12 RX ADMIN — CYANOCOBALAMIN TAB 1000 MCG 1000 MCG: 1000 TAB at 09:38

## 2025-03-12 RX ADMIN — LEVOTHYROXINE SODIUM 25 MCG: 0.03 TABLET ORAL at 09:38

## 2025-03-12 RX ADMIN — TRAZODONE HYDROCHLORIDE 100 MG: 100 TABLET ORAL at 21:25

## 2025-03-12 RX ADMIN — SENNOSIDES 8.6 MG: 8.6 TABLET, FILM COATED ORAL at 09:38

## 2025-03-12 RX ADMIN — LORATADINE 10 MG: 10 TABLET ORAL at 09:38

## 2025-03-12 RX ADMIN — RISPERIDONE 2 MG: 2 TABLET, FILM COATED ORAL at 16:00

## 2025-03-12 RX ADMIN — RISPERIDONE 2 MG: 2 TABLET, FILM COATED ORAL at 09:38

## 2025-03-12 RX ADMIN — IBUPROFEN 600 MG: 600 TABLET, FILM COATED ORAL at 10:19

## 2025-03-12 RX ADMIN — HYDROXYZINE HYDROCHLORIDE 100 MG: 50 TABLET, FILM COATED ORAL at 09:38

## 2025-03-12 RX ADMIN — GABAPENTIN 100 MG: 100 CAPSULE ORAL at 16:00

## 2025-03-12 RX ADMIN — GABAPENTIN 100 MG: 100 CAPSULE ORAL at 21:25

## 2025-03-12 RX ADMIN — DIVALPROEX SODIUM 750 MG: 500 TABLET, EXTENDED RELEASE ORAL at 21:25

## 2025-03-12 RX ADMIN — GABAPENTIN 100 MG: 100 CAPSULE ORAL at 11:51

## 2025-03-12 NOTE — NURSING NOTE
"Patient crying in her room.  Patient requested \"something for anxiety.\"  She stated its severe.  Patient reports she is afraid to return to her group home because of the staff. She is afraid of them.  Patient was given 100 mg po PRN atarax for anxiety.  "
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
## 2025-03-12 NOTE — PLAN OF CARE
Problem: SELF HARM/SUICIDALITY  Goal: Will have no self-injury during hospital stay  Description: INTERVENTIONS:  - Q 15 MINUTES: Routine safety checks  - Q WAKING SHIFT & PRN: Assess risk to determine if routine checks are adequate to maintain patient safety  - Encourage patient to participate actively in care by formulating a plan to combat response to suicidal ideation, identify supports and resources  Outcome: Progressing     Problem: DEPRESSION  Goal: Will be euthymic at discharge  Description: INTERVENTIONS:  - Administer medication as ordered  - Provide emotional support via 1:1 interaction with staff  - Encourage involvement in milieu/groups/activities  - Monitor for social isolation  Outcome: Progressing     Problem: BEHAVIOR  Goal: Pt/Family maintain appropriate behavior and adhere to behavioral management agreement, if implemented  Description: INTERVENTIONS:  - Assess the family dynamic   - Encourage verbalization of thoughts and concerns in a socially appropriate manner  - Assess patient/family's coping skills and non-compliant behavior (including use of illegal substances).  - Utilize positive, consistent limit setting strategies supporting safety of patient, staff and others  - Initiate consult with Case Management, Spiritual Care or other ancillary services as appropriate  - If a patient's/visitor's behavior jeopardizes the safety of the patient, staff, or others, refer to organization procedure.   - Notify Security of behavior or suspected illegal substances which indicate the need for search of the patient and/or belongings  - Encourage participation in the decision making process about a behavioral management agreement; implement if patient meets criteria  Outcome: Progressing     Problem: ANXIETY  Goal: Will report anxiety at manageable levels  Description: INTERVENTIONS:  - Administer medication as ordered  - Teach and encourage coping skills  - Provide emotional support  - Assess  patient/family for anxiety and ability to cope  Outcome: Progressing     Problem: Risk for Violence/Aggression Toward Others  Goal: Treatment Goal: Refrain from acts of violence/aggression during length of stay, and demonstrate improved impulse control at the time of discharge  Outcome: Progressing  Goal: Verbalize thoughts and feelings  Description: Interventions:  - Assess and re-assess patient's level of risk, every waking shift  - Engage patient in 1:1 interactions, daily, for a minimum of 15 minutes   - Allow patient to express feelings and frustrations in a safe and non-threatening manner   - Establish rapport/trust with patient   Outcome: Progressing  Goal: Refrain from harming others  Outcome: Progressing  Goal: Refrain from destructive acts on the environment or property  Outcome: Progressing  Goal: Control angry outbursts  Description: Interventions:  - Monitor patient closely, per order  - Ensure early verbal de-escalation  - Monitor prn medication needs  - Set reasonable/therapeutic limits, outline behavioral expectations, and consequences   - Provide a non-threatening milieu, utilizing the least restrictive interventions   Outcome: Progressing  Goal: Identify appropriate positive anger management techniques  Description: Interventions:  - Offer anger management and coping skills groups   - Staff will provide positive feedback for appropriate anger control  Outcome: Progressing     Problem: ANXIETY  Goal: By discharge: Patient will verbalize 2 strategies to deal with anxiety  Description: Interventions:  - Identify any obvious source/trigger to anxiety  - Staff will assist patient in applying identified coping technique/skills  - Encourage attendance of scheduled groups and activities  Outcome: Not Progressing

## 2025-03-12 NOTE — PLAN OF CARE
Problem: Risk for Violence/Aggression Toward Others  Goal: Attend and participate in unit activities, including therapeutic, recreational, and educational groups  Description: Interventions:  - Provide therapeutic and educational activities daily, encourage attendance and participation, and document same in the medical record   Outcome: Progressing     Problem: Ineffective Coping  Goal: Participates in unit activities  Description: Interventions:  - Provide therapeutic environment   - Provide required programming   - Redirect inappropriate behaviors   Outcome: Progressing
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
## 2025-03-12 NOTE — ASSESSMENT & PLAN NOTE
Tameka still feels depressed and anxious, still reports thoughts to harm group home staff. She feels safe in the hospital, but states that she would not be able to control her anger if she was back at home.    Current medications:  Continue Celexa 20 mg daily to help with depression and anxiety  Continue Depakote  mg at bedtime to help with mood stabilization  Check Depakote level tonight (not done yesterday). Plan to adjust Depakote ER dosing based on results of Depakote level  Continue Melatonin and adjust dosing to 6 mg at bedtime to help with sleep  Continue Restoril 15 mg at bedtime also to help with sleep  Continue Trazodone 100 mg at bedtime also to help with sleep   Continue Risperdal 2 mg tid to help with mood.   Add Neurontin 100 mg tid to help with impulse control

## 2025-03-12 NOTE — NURSING NOTE
Pt visible/social on unit in day room with peers. Denies all psych symptoms, needs and concerns. Calm and cooperative with unit routines and care. Pt appears to be enjoying conversation with peers over snack and has attended groups. No behavioral issues noted/reported. Denies unmet needs. Safety checks ongoing.

## 2025-03-12 NOTE — NURSING NOTE
Pt's birth control and belongings were dropped off. Belongings inventoried and pt given what she is allowed to keep- instructed to keep stuffed animal in her room. BCP pill pack taken to pharmacy for identification and labeling.

## 2025-03-12 NOTE — NURSING NOTE
Patient visible on unit and social.  She denies SI/HI and A/V hallucinations.  She does not report any needs at this time.

## 2025-03-13 LAB — VALPROATE SERPL-MCNC: 51 UG/ML (ref 50–100)

## 2025-03-13 PROCEDURE — 80164 ASSAY DIPROPYLACETIC ACD TOT: CPT | Performed by: PSYCHIATRY & NEUROLOGY

## 2025-03-13 PROCEDURE — 99232 SBSQ HOSP IP/OBS MODERATE 35: CPT | Performed by: PSYCHIATRY & NEUROLOGY

## 2025-03-13 RX ORDER — DIVALPROEX SODIUM 500 MG/1
1000 TABLET, FILM COATED, EXTENDED RELEASE ORAL
Status: DISCONTINUED | OUTPATIENT
Start: 2025-03-13 | End: 2025-03-19

## 2025-03-13 RX ADMIN — LEVOTHYROXINE SODIUM 25 MCG: 0.03 TABLET ORAL at 08:42

## 2025-03-13 RX ADMIN — GABAPENTIN 100 MG: 100 CAPSULE ORAL at 17:00

## 2025-03-13 RX ADMIN — RISPERIDONE 2 MG: 2 TABLET, FILM COATED ORAL at 17:00

## 2025-03-13 RX ADMIN — RISPERIDONE 2 MG: 2 TABLET, FILM COATED ORAL at 21:12

## 2025-03-13 RX ADMIN — GABAPENTIN 100 MG: 100 CAPSULE ORAL at 08:42

## 2025-03-13 RX ADMIN — DIVALPROEX SODIUM 1000 MG: 500 TABLET, EXTENDED RELEASE ORAL at 21:12

## 2025-03-13 RX ADMIN — Medication 6 MG: at 21:12

## 2025-03-13 RX ADMIN — LORATADINE 10 MG: 10 TABLET ORAL at 08:42

## 2025-03-13 RX ADMIN — TRAZODONE HYDROCHLORIDE 100 MG: 100 TABLET ORAL at 21:12

## 2025-03-13 RX ADMIN — CYANOCOBALAMIN TAB 1000 MCG 1000 MCG: 1000 TAB at 08:42

## 2025-03-13 RX ADMIN — NORGESTIMATE AND ETHINYL ESTRADIOL 1 TABLET: KIT at 09:07

## 2025-03-13 RX ADMIN — SENNOSIDES 8.6 MG: 8.6 TABLET, FILM COATED ORAL at 08:42

## 2025-03-13 RX ADMIN — CITALOPRAM HYDROBROMIDE 20 MG: 20 TABLET ORAL at 08:42

## 2025-03-13 RX ADMIN — GABAPENTIN 100 MG: 100 CAPSULE ORAL at 21:12

## 2025-03-13 RX ADMIN — Medication 2000 UNITS: at 08:42

## 2025-03-13 RX ADMIN — RISPERIDONE 2 MG: 2 TABLET, FILM COATED ORAL at 08:42

## 2025-03-13 NOTE — PLAN OF CARE
Problem: SELF HARM/SUICIDALITY  Goal: Will have no self-injury during hospital stay  Description: INTERVENTIONS:  - Q 15 MINUTES: Routine safety checks  - Q WAKING SHIFT & PRN: Assess risk to determine if routine checks are adequate to maintain patient safety  - Encourage patient to participate actively in care by formulating a plan to combat response to suicidal ideation, identify supports and resources  Outcome: Progressing     Problem: DEPRESSION  Goal: Will be euthymic at discharge  Description: INTERVENTIONS:  - Administer medication as ordered  - Provide emotional support via 1:1 interaction with staff  - Encourage involvement in milieu/groups/activities  - Monitor for social isolation  Outcome: Progressing     Problem: BEHAVIOR  Goal: Pt/Family maintain appropriate behavior and adhere to behavioral management agreement, if implemented  Description: INTERVENTIONS:  - Assess the family dynamic   - Encourage verbalization of thoughts and concerns in a socially appropriate manner  - Assess patient/family's coping skills and non-compliant behavior (including use of illegal substances).  - Utilize positive, consistent limit setting strategies supporting safety of patient, staff and others  - Initiate consult with Case Management, Spiritual Care or other ancillary services as appropriate  - If a patient's/visitor's behavior jeopardizes the safety of the patient, staff, or others, refer to organization procedure.   - Notify Security of behavior or suspected illegal substances which indicate the need for search of the patient and/or belongings  - Encourage participation in the decision making process about a behavioral management agreement; implement if patient meets criteria  Outcome: Progressing     Problem: ANXIETY  Goal: Will report anxiety at manageable levels  Description: INTERVENTIONS:  - Administer medication as ordered  - Teach and encourage coping skills  - Provide emotional support  - Assess  patient/family for anxiety and ability to cope  Outcome: Progressing  Goal: By discharge: Patient will verbalize 2 strategies to deal with anxiety  Description: Interventions:  - Identify any obvious source/trigger to anxiety  - Staff will assist patient in applying identified coping technique/skills  - Encourage attendance of scheduled groups and activities  Outcome: Progressing     Problem: Risk for Violence/Aggression Toward Others  Goal: Treatment Goal: Refrain from acts of violence/aggression during length of stay, and demonstrate improved impulse control at the time of discharge  Outcome: Progressing  Goal: Verbalize thoughts and feelings  Description: Interventions:  - Assess and re-assess patient's level of risk, every waking shift  - Engage patient in 1:1 interactions, daily, for a minimum of 15 minutes   - Allow patient to express feelings and frustrations in a safe and non-threatening manner   - Establish rapport/trust with patient   Outcome: Progressing  Goal: Refrain from harming others  Outcome: Progressing  Goal: Refrain from destructive acts on the environment or property  Outcome: Progressing  Goal: Control angry outbursts  Description: Interventions:  - Monitor patient closely, per order  - Ensure early verbal de-escalation  - Monitor prn medication needs  - Set reasonable/therapeutic limits, outline behavioral expectations, and consequences   - Provide a non-threatening milieu, utilizing the least restrictive interventions   Outcome: Progressing  Goal: Attend and participate in unit activities, including therapeutic, recreational, and educational groups  Description: Interventions:  - Provide therapeutic and educational activities daily, encourage attendance and participation, and document same in the medical record   Outcome: Progressing  Goal: Identify appropriate positive anger management techniques  Description: Interventions:  - Offer anger management and coping skills groups   - Staff will  provide positive feedback for appropriate anger control  Outcome: Progressing     Problem: Ineffective Coping  Goal: Participates in unit activities  Description: Interventions:  - Provide therapeutic environment   - Provide required programming   - Redirect inappropriate behaviors   Outcome: Progressing     Problem: DISCHARGE PLANNING - CARE MANAGEMENT  Goal: Discharge to post-acute care or home with appropriate resources  Description: INTERVENTIONS:  - Conduct assessment to determine patient/family and health care team treatment goals, and need for post-acute services based on payer coverage, community resources, and patient preferences, and barriers to discharge  - Address psychosocial, clinical, and financial barriers to discharge as identified in assessment in conjunction with the patient/family and health care team  - Arrange appropriate level of post-acute services according to patient’s   needs and preference and payer coverage in collaboration with the physician and health care team  - Communicate with and update the patient/family, physician, and health care team regarding progress on the discharge plan  - Arrange appropriate transportation to post-acute venues  Outcome: Not Progressing

## 2025-03-13 NOTE — ASSESSMENT & PLAN NOTE
Tameka still feels depressed and anxious, still reports thoughts to harm group home staff. She feels safe in the hospital, but states that she would not be able to control her anger if she was back at home.    Current medications:  Continue Celexa 20 mg daily to help with depression and anxiety  Increase Depakote ER to 1000 mg at bedtime to help with mood stabilization  Depakote level of 51 (drawn the morning of 3/13 at 0610, so not at trough); will increase Depakote with plan to follow up Depakote level on 3/16 (Sunday) prior to evening dose  Continue Melatonin and adjust dosing to 6 mg at bedtime to help with sleep  Continue Restoril 15 mg at bedtime also to help with sleep  Continue Trazodone 100 mg at bedtime also to help with sleep   Continue Risperdal 2 mg tid to help with mood.   Continue Neurontin 100 mg tid to help with impulse control

## 2025-03-13 NOTE — NURSING NOTE
Pt is calm, cooperative, isolating so self but is seen in milieu for needs, meals and medications. Pt denies SI, HI, AH, VH and pain at this time. Pt is attending partial groups, doing ADLs and is medication/meal compliant. Pt denies any unmet needs and remains on q15 min checks for safety.

## 2025-03-13 NOTE — NURSING NOTE
Pt calm and mainly isolative to room this evening stating she is tired. Denies SI/HI/AVH. Denies pain and anxiety on assessment. Unable to obtain Valp level on 3 separate attempts without success, Dr. Murphy aware. Pt denies unmet needs or concerns. Aware she needs Valp level for Depakote to be adjusted. Compliant with unit routines and care. Safety checks ongoing.

## 2025-03-13 NOTE — PROGRESS NOTES
Progress Note - Behavioral Health   Name: Tameka Alvarado 30 y.o. female I MRN: [id number]  Unit/Bed#: -01 I Date of Admission: 3/10/2025   Date of Service: 3/13/2025 I Hospital Day: 3     Assessment & Plan  Bipolar I disorder, most recent episode depressed, severe without psychotic features (HCC)  Tameka still feels depressed and anxious, still reports thoughts to harm group home staff. She feels safe in the hospital, but states that she would not be able to control her anger if she was back at home.    Current medications:  Continue Celexa 20 mg daily to help with depression and anxiety  Increase Depakote ER to 1000 mg at bedtime to help with mood stabilization  Depakote level of 51 (drawn the morning of 3/13 at 0610, so not at trough); will increase Depakote with plan to follow up Depakote level on 3/16 (Sunday) prior to evening dose  Continue Melatonin and adjust dosing to 6 mg at bedtime to help with sleep  Continue Restoril 15 mg at bedtime also to help with sleep  Continue Trazodone 100 mg at bedtime also to help with sleep   Continue Risperdal 2 mg tid to help with mood.   Continue Neurontin 100 mg tid to help with impulse control  RADHA (generalized anxiety disorder)  Management per Principal Problem  Obsessive-compulsive disorder, unspecified  Management per Principal Problem  Autism spectrum disorder  Management per Principal Problem  Gastroesophageal reflux disease without esophagitis  Management per medical service  Hypothyroidism  Management per medical service  Medical clearance for psychiatric admission  Management per medical service  Vitamin D deficiency  Management per medical service  Vitamin B12 deficiency  Management per medical service    Current medications:  Current Facility-Administered Medications   Medication Dose Route Frequency Provider Last Rate    aluminum-magnesium hydroxide-simethicone  30 mL Oral Q4H PRN Bonita Murphy MD      haloperidol lactate  2.5 mg Intramuscular Q4H PRN  Max 4/day Bonita Murphy MD      And    LORazepam  1 mg Intramuscular Q4H PRN Max 4/day Bonita Murphy MD      And    benztropine  0.5 mg Intramuscular Q4H PRN Max 4/day Bonita Murphy MD      haloperidol lactate  5 mg Intramuscular Q4H PRN Max 4/day Bonita Murphy MD      And    LORazepam  2 mg Intramuscular Q4H PRN Max 4/day Bonita Murphy MD      And    benztropine  1 mg Intramuscular Q4H PRN Max 4/day Bonita Murphy MD      benztropine  1 mg Intramuscular Q4H PRN Max 6/day Bonita Murphy MD      benztropine  1 mg Oral Q4H PRN Max 6/day Bonita Murphy MD      bisacodyl  10 mg Rectal Daily PRN Bonita Murphy MD      Cholecalciferol  2,000 Units Oral Daily CHRISTIANNE Hernandez      citalopram  20 mg Oral Daily Bonita Murphy MD      cyanocobalamin  1,000 mcg Oral Daily CHRISTIANNE Hernandez      hydrOXYzine HCL  50 mg Oral Q6H PRN Max 4/day Bonita Murphy MD      Or    diphenhydrAMINE  50 mg Intramuscular Q6H PRN Bonita Murphy MD      divalproex sodium  750 mg Oral HS Bonita Murphy MD      famotidine  20 mg Oral BID PRN Love Stuart MD      gabapentin  100 mg Oral TID Love Stuart MD      haloperidol  1 mg Oral Q6H PRN Bonita Murphy MD      haloperidol  2.5 mg Oral Q4H PRN Max 4/day Bonita Murphy MD      haloperidol  5 mg Oral Q4H PRN Max 4/day Bonita Murphy MD      hydrOXYzine HCL  100 mg Oral Q6H PRN Max 4/day Bonita Murphy MD      Or    LORazepam  2 mg Intramuscular Q6H PRN Bonita Murphy MD      hydrOXYzine HCL  25 mg Oral Q6H PRN Max 4/day Bonita Murphy MD      ibuprofen  400 mg Oral Q4H PRN Bonita Murphy MD      ibuprofen  600 mg Oral Q6H PRN Bonita Murphy MD      ibuprofen  800 mg Oral Q8H PRN Bonita Murphy MD      levothyroxine  25 mcg Oral Daily Bonita Murphy MD      loratadine  10 mg Oral Daily Love Stuart MD      melatonin  6 mg Oral HS Love Stuart MD      Norgestimate-Eth Estradiol  1  tablet Oral Daily Love Stuart MD      polyethylene glycol  17 g Oral Daily PRN Bonita Murphy MD      propranolol  10 mg Oral Q8H PRN Bonita Murphy MD      risperiDONE  2 mg Oral TID Love Stuart MD      senna  1 tablet Oral Daily MD ana paula Dykes-docusate sodium  1 tablet Oral Daily PRN Bonita Murphy MD      traZODone  100 mg Oral HS Love Stuart MD      traZODone  50 mg Oral HS PRN Bonita Murphy MD          Risks/Benefits of Treatment:     Risks, benefits, and possible side effects of medications explained to patient. Patient has limited understanding of risks and benefits of treatment at this time, but agrees to take medications as prescribed.  Risks of medication in pregnancy explained if female patient.  Patient verbalizes understanding and agrees to notify doctor if she becomes pregnant.    Progress Toward Goals:  no signifigant progress towards goals, anxiety and depression continue, no SI   does not endorse any homicidal thoughts today although reports recently she had been    Treatment Planning:     All current active medications have been reviewed.  Continue to monitor response to treatment and assess for potential side effects of medications.  Encourage group therapy, milieu therapy and occupational therapy  Collaboration with medical service for medical comorbidities as indicated.  Behavioral Health checks for safety monitoring.  Long Stay Certification : Not Applicable  Estimated Discharge Day: 3/20/2025   Legal Status : Voluntary 201 commitment.    Subjective     Behavior over the last 24 hours: unchanged.    Patient feeling continued anxiety and depression, only slightly improved, and occasional reports of panic but denies any thoughts of harm to the group home staff when questioned today although reports she recently was thinking of that yesterday.  She feels safe in the hospital, denies any suicidal thoughts and is agreeable with continuing  medications as prescribed, agreeable to increasing Depakote and following up her level on Sunday, and denying any medication side effects at this point in time.    Sleep: normal  Appetite: normal  Medication side effects: No  ROS: review of systems as noted above in HPI/Subjective report, all other systems are negative    Objective :  Temp:  [97.6 °F (36.4 °C)-98 °F (36.7 °C)] 97.6 °F (36.4 °C)  HR:  [66-97] 66  BP: (106-118)/(54-57) 106/54  Resp:  [16] 16  SpO2:  [97 %-98 %] 97 %  O2 Device: None (Room air)    Temp:  [97.6 °F (36.4 °C)-98 °F (36.7 °C)] 97.6 °F (36.4 °C)  HR:  [66-97] 66  BP: (106-118)/(54-57) 106/54  Resp:  [16] 16  SpO2:  [97 %-98 %] 97 %  O2 Device: None (Room air)    Mental Status Evaluation:    Appearance:  age appropriate, casually dressed   Behavior:  cooperative, calm   Speech:  normal rate, normal volume   Mood:  depressed, anxious   Affect:  blunted   Thought Process:  concrete   Thought Content:  no overt delusions   Perceptual Disturbances: no auditory hallucinations, no visual hallucinations   Risk Potential: Suicidal Ideation -  None at present  Homicidal Ideation -  None at present  Potential for Aggression - No   Sensorium:  oriented to person, place, and time/date   Memory:  recent and remote memory grossly intact   Consciousness:  alert and awake   Attention/Concentration: attention span and concentration appear shorter than expected for age   Insight:  limited   Judgment: limited   Gait/Station: normal gait/station, normal balance   Motor Activity: no abnormal movements       Lab Results: I have reviewed the following results:  Most Recent Labs:   Lab Results   Component Value Date    WBC 5.32 03/11/2025    RBC 4.19 03/11/2025    HGB 13.2 03/11/2025    HCT 40.2 03/11/2025     03/11/2025    RDW 12.6 03/11/2025    NEUTROABS 2.55 03/11/2025    SODIUM 140 03/11/2025    K 4.0 03/11/2025     03/11/2025    CO2 28 03/11/2025    BUN 13 03/11/2025    CREATININE 0.74 03/11/2025     GLUC 101 03/11/2025    CALCIUM 9.0 03/11/2025    AST 22 03/11/2025    ALT 28 03/11/2025    ALKPHOS 61 03/11/2025    TP 6.2 (L) 03/11/2025    ALB 3.8 03/11/2025    TBILI 0.37 03/11/2025    CHOLESTEROL 153 03/11/2025    HDL 61 03/11/2025    TRIG 135 03/11/2025    LDLCALC 65 03/11/2025    NONHDLC 92 03/11/2025    VALPROICTOT 51 03/13/2025    AMMONIA <10 (L) 08/16/2019    PBB3XRACPGZH 4.481 03/11/2025    FREET4 1.06 07/06/2020    T3FREE 3.29 07/06/2020    PREGUR negaTIVE FOR PREG 08/13/2019    PREGSERUM Negative 03/11/2025    HGBA1C 5.5 03/11/2025     03/11/2025       Administrative Statements     Counseling / Coordination of Care:   Patient's progress discussed with staff in treatment team meeting.  Medication changes reviewed with staff in treatment team meeting..    Daljit Chau MD 03/13/25

## 2025-03-13 NOTE — PROGRESS NOTES
03/13/25 0841   Team Meeting   Meeting Type Daily Rounds   Team Members Present   Team Members Present Physician;Nurse;   Physician Team Member Narciso   Nursing Team Member [facility] Management Team Member Hema   Patient/Family Present   Patient Present No   Patient's Family Present No     Pt is visible on unit. Was tearful and anxious is her room yesterday. Pt expressing stress over returning back to the group home.

## 2025-03-14 PROCEDURE — 99232 SBSQ HOSP IP/OBS MODERATE 35: CPT

## 2025-03-14 RX ADMIN — RISPERIDONE 2 MG: 2 TABLET, FILM COATED ORAL at 15:31

## 2025-03-14 RX ADMIN — GABAPENTIN 100 MG: 100 CAPSULE ORAL at 08:38

## 2025-03-14 RX ADMIN — CYANOCOBALAMIN TAB 1000 MCG 1000 MCG: 1000 TAB at 08:38

## 2025-03-14 RX ADMIN — Medication 2000 UNITS: at 08:38

## 2025-03-14 RX ADMIN — RISPERIDONE 2 MG: 2 TABLET, FILM COATED ORAL at 08:38

## 2025-03-14 RX ADMIN — Medication 6 MG: at 21:11

## 2025-03-14 RX ADMIN — CITALOPRAM HYDROBROMIDE 20 MG: 20 TABLET ORAL at 08:38

## 2025-03-14 RX ADMIN — NORGESTIMATE AND ETHINYL ESTRADIOL 1 TABLET: KIT at 08:39

## 2025-03-14 RX ADMIN — GABAPENTIN 100 MG: 100 CAPSULE ORAL at 21:11

## 2025-03-14 RX ADMIN — LORATADINE 10 MG: 10 TABLET ORAL at 08:38

## 2025-03-14 RX ADMIN — LEVOTHYROXINE SODIUM 25 MCG: 0.03 TABLET ORAL at 08:38

## 2025-03-14 RX ADMIN — DIVALPROEX SODIUM 1000 MG: 500 TABLET, EXTENDED RELEASE ORAL at 21:11

## 2025-03-14 RX ADMIN — GABAPENTIN 100 MG: 100 CAPSULE ORAL at 15:31

## 2025-03-14 RX ADMIN — IBUPROFEN 600 MG: 600 TABLET, FILM COATED ORAL at 15:31

## 2025-03-14 RX ADMIN — TRAZODONE HYDROCHLORIDE 100 MG: 100 TABLET ORAL at 21:11

## 2025-03-14 RX ADMIN — HYDROXYZINE HYDROCHLORIDE 100 MG: 50 TABLET, FILM COATED ORAL at 11:50

## 2025-03-14 RX ADMIN — SENNOSIDES 8.6 MG: 8.6 TABLET, FILM COATED ORAL at 08:38

## 2025-03-14 RX ADMIN — RISPERIDONE 2 MG: 2 TABLET, FILM COATED ORAL at 21:11

## 2025-03-14 NOTE — ASSESSMENT & PLAN NOTE
Tameka still feels depressed and anxious, still reports thoughts to harm group home staff. She feels safe in the hospital, but states that she would not be able to control her anger if she was back at home.    Current medications:  Continue Celexa 20 mg daily to help with depression and anxiety  Continue Depakote ER to 1000 mg (increased 3/13) at bedtime to help with mood stabilization  Depakote level of 51 (drawn the morning of 3/13 at 0610, so not at trough); will increase Depakote with plan to follow up Depakote level on 3/16 (Sunday) prior to evening dose  Continue Melatonin and adjust dosing to 6 mg at bedtime to help with sleep  Continue Restoril 15 mg at bedtime also to help with sleep  Continue Trazodone 100 mg at bedtime also to help with sleep   Continue Risperdal 2 mg tid to help with mood.   Continue Neurontin 100 mg tid to help with impulse control

## 2025-03-14 NOTE — PROGRESS NOTES
Progress Note - Behavioral Health   Name: Tameka Alvarado 30 y.o. female I MRN: [id number]  Unit/Bed#: -01 I Date of Admission: 3/10/2025   Date of Service: 3/14/2025 I Hospital Day: 4     Assessment & Plan  Bipolar I disorder, most recent episode depressed, severe without psychotic features (HCC)  Tameka still feels depressed and anxious, still reports thoughts to harm group home staff. She feels safe in the hospital, but states that she would not be able to control her anger if she was back at home.    Current medications:  Continue Celexa 20 mg daily to help with depression and anxiety  Continue Depakote ER to 1000 mg (increased 3/13) at bedtime to help with mood stabilization  Depakote level of 51 (drawn the morning of 3/13 at 0610, so not at trough); will increase Depakote with plan to follow up Depakote level on 3/16 (Sunday) prior to evening dose  Continue Melatonin and adjust dosing to 6 mg at bedtime to help with sleep  Continue Restoril 15 mg at bedtime also to help with sleep  Continue Trazodone 100 mg at bedtime also to help with sleep   Continue Risperdal 2 mg tid to help with mood.   Continue Neurontin 100 mg tid to help with impulse control  RADHA (generalized anxiety disorder)  Management per Principal Problem  Obsessive-compulsive disorder, unspecified  Management per Principal Problem  Autism spectrum disorder  Management per Principal Problem  Gastroesophageal reflux disease without esophagitis  Management per medical service  Hypothyroidism  Management per medical service  Medical clearance for psychiatric admission  Management per medical service  Vitamin D deficiency  Management per medical service  Vitamin B12 deficiency  Management per medical service    Current medications:  Current Facility-Administered Medications   Medication Dose Route Frequency Provider Last Rate    aluminum-magnesium hydroxide-simethicone  30 mL Oral Q4H PRN Bonita Murphy MD      haloperidol lactate  2.5 mg  Intramuscular Q4H PRN Max 4/day Bonita Murphy MD      And    LORazepam  1 mg Intramuscular Q4H PRN Max 4/day Bonita Murphy MD      And    benztropine  0.5 mg Intramuscular Q4H PRN Max 4/day Bonita Murphy MD      haloperidol lactate  5 mg Intramuscular Q4H PRN Max 4/day Bonita Murphy MD      And    LORazepam  2 mg Intramuscular Q4H PRN Max 4/day Bonita Murphy MD      And    benztropine  1 mg Intramuscular Q4H PRN Max 4/day Bonita Murphy MD      benztropine  1 mg Intramuscular Q4H PRN Max 6/day Bonita Murphy MD      benztropine  1 mg Oral Q4H PRN Max 6/day Bonita Murphy MD      bisacodyl  10 mg Rectal Daily PRN Bonita Murphy MD      Cholecalciferol  2,000 Units Oral Daily CHRISTIANNE Hernandez      citalopram  20 mg Oral Daily Bonita Murphy MD      cyanocobalamin  1,000 mcg Oral Daily CHRISTIANNE Hernandez      hydrOXYzine HCL  50 mg Oral Q6H PRN Max 4/day Bonita Murphy MD      Or    diphenhydrAMINE  50 mg Intramuscular Q6H PRN Bonita Murphy MD      divalproex sodium  1,000 mg Oral HS Daljit Chau MD      famotidine  20 mg Oral BID PRN Love Stuart MD      gabapentin  100 mg Oral TID Love Stuart MD      haloperidol  1 mg Oral Q6H PRN Bonita Murphy MD      haloperidol  2.5 mg Oral Q4H PRN Max 4/day Bonita Murphy MD      haloperidol  5 mg Oral Q4H PRN Max 4/day Bonita Murphy MD      hydrOXYzine HCL  100 mg Oral Q6H PRN Max 4/day Bonita Murphy MD      Or    LORazepam  2 mg Intramuscular Q6H PRN Bonita Murphy MD      hydrOXYzine HCL  25 mg Oral Q6H PRN Max 4/day Bonita Murphy MD      ibuprofen  400 mg Oral Q4H PRN Bonita Murphy MD      ibuprofen  600 mg Oral Q6H PRN Bonita Murphy MD      ibuprofen  800 mg Oral Q8H PRN Bonita Murphy MD      levothyroxine  25 mcg Oral Daily Bonita Murphy MD      loratadine  10 mg Oral Daily Love Stuart MD      melatonin  6 mg Oral HS Love Stuart MD       "Norgestimate-Eth Estradiol  1 tablet Oral Daily Love Stuart MD      polyethylene glycol  17 g Oral Daily PRN Bonita Murphy MD      propranolol  10 mg Oral Q8H PRN Bonita Murphy MD      risperiDONE  2 mg Oral TID Love Stuart MD      senna  1 tablet Oral Daily Bonita Murphy MD      senrisa-docusate sodium  1 tablet Oral Daily PRN Bonita Murphy MD      traZODone  100 mg Oral HS Love Stuart MD      traZODone  50 mg Oral HS PRMICHAEL Murphy MD          Risks/Benefits of Treatment:     Risks, benefits, and possible side effects of medications explained to patient and patient verbalizes understanding and agreement for treatment.    Progress Toward Goals: progressing    Treatment Planning:     All current active medications have been reviewed.  Continue to monitor response to treatment and assess for potential side effects of medications.  Encourage group therapy, milieu therapy and occupational therapy  Collaboration with medical service for medical comorbidities as indicated.  Behavioral Health checks for safety monitoring.  Long Stay Certification : Not Applicable  Estimated Discharge Day: 3/20/2025   Legal Status : Voluntary 201 commitment.    Subjective     Behavior over the last 24 hours: some improvement.    Per staff, Tameka has been meal and medication compliant.  He has been calm and cooperative on the unit.  She is attending groups and has been visible on the unit.  She remains mostly isolative to self however.  There have been no crying episodes in last 24 hours.     Tameka was seen in the group room away from peers today.  Tameka was and cooperative with the interview.  She reports that her anxiety and depression feel \"a little better\".  When asked about continued thoughts to harm staff at her group home she said \"not really\".  She is tolerating the increase in Depakote last evening.  She denied any morning sedation.  She reports she has been sleeping straight through the " night.  She was perseverative about discharge today asking multiple times if there was a date.  She was encouraged to continue to comply with medications and engage in milieu and groups.  She denies any suicidal ideation intent or plan today.  She denies any hallucinations and did not appear to be internally stimulated during the interview.     Sleep: normal  Appetite: normal  Medication side effects: No  ROS: review of systems as noted above in HPI/Subjective report, all other systems are negative    Objective :  Temp:  [97.3 °F (36.3 °C)-98.3 °F (36.8 °C)] 98.3 °F (36.8 °C)  HR:  [] 89  BP: (133)/(59-94) 133/59  Resp:  [16] 16  SpO2:  [99 %] 99 %  O2 Device: None (Room air)    Temp:  [97.3 °F (36.3 °C)-98.3 °F (36.8 °C)] 98.3 °F (36.8 °C)  HR:  [] 89  BP: (133)/(59-94) 133/59  Resp:  [16] 16  SpO2:  [99 %] 99 %  O2 Device: None (Room air)    Mental Status Evaluation:    Appearance:  casually dressed, marginal hygiene   Behavior:  cooperative, calm   Speech:  normal rate and volume   Mood:  less anxious   Affect:  constricted   Thought Process:  perseverative, concrete   Thought Content:  no overt delusions   Perceptual Disturbances: denies auditory hallucinations when asked, denies visual hallucinations when asked, does not appear responding to internal stimuli   Risk Potential: Suicidal Ideation -  None at present  Homicidal Ideation -  None at present  Potential for Aggression - No   Sensorium:  oriented to person, place, and time/date   Memory:  recent memory intact   Consciousness:  alert and awake   Attention/Concentration: attention span and concentration appear shorter than expected for age   Insight:  limited   Judgment: limited   Gait/Station: normal gait/station, normal balance   Motor Activity: no abnormal movements       Lab Results: I have reviewed the following results:  Results from the past 24 hours: No results found for this or any previous visit (from the past 24 hours).  Most Recent  Labs:   Lab Results   Component Value Date    WBC 5.32 03/11/2025    RBC 4.19 03/11/2025    HGB 13.2 03/11/2025    HCT 40.2 03/11/2025     03/11/2025    RDW 12.6 03/11/2025    NEUTROABS 2.55 03/11/2025    SODIUM 140 03/11/2025    K 4.0 03/11/2025     03/11/2025    CO2 28 03/11/2025    BUN 13 03/11/2025    CREATININE 0.74 03/11/2025    GLUC 101 03/11/2025    CALCIUM 9.0 03/11/2025    AST 22 03/11/2025    ALT 28 03/11/2025    ALKPHOS 61 03/11/2025    TP 6.2 (L) 03/11/2025    ALB 3.8 03/11/2025    TBILI 0.37 03/11/2025    CHOLESTEROL 153 03/11/2025    HDL 61 03/11/2025    TRIG 135 03/11/2025    LDLCALC 65 03/11/2025    NONHDLC 92 03/11/2025    VALPROICTOT 51 03/13/2025    AMMONIA <10 (L) 08/16/2019    HJM1FCEKVBII 4.481 03/11/2025    FREET4 1.06 07/06/2020    T3FREE 3.29 07/06/2020    PREGUR negaTIVE FOR PREG 08/13/2019    PREGSERUM Negative 03/11/2025    HGBA1C 5.5 03/11/2025     03/11/2025       Administrative Statements     Counseling / Coordination of Care:   I have spent a total time of 30 minutes in caring for this patient on the day of the visit/encounter including:  Patient's progress discussed with staff in treatment team meeting.  Medication changes reviewed with staff in treatment team meeting..    CHRISTIANNE Levy 03/14/25

## 2025-03-14 NOTE — NURSING NOTE
Patient approached the nursing station, tearful, complained of anxiety related to leaving the hospital. Reports utilizing coping skills, such as coloring, though their anxiety has become overwhelming. Agreeable to PRN medication.    Benitez Anxiety scale score 25. Received PRN atarax 100 mg PO for severe anxiety at 1150.

## 2025-03-14 NOTE — NURSING NOTE
"Upon reassessment one hour later at 1250 patient observed laying in their bed calmly. Expresses that they feel \"tired\" and the medication helped when asked. Medication effective.   "

## 2025-03-14 NOTE — NURSING NOTE
"Visible in the milieu most of the morning. Calm and cooperative. Describes their mood as \"okay\". Denies depression, anxiety, SI, HI, and hallucinations of any kind.    Medication compliant.    Attending groups. Encouraged ADLs.    Patient expresses in conversation feeling scared of discharge and repeating the same behaviors that led to their admission.    Staff availability reinforced.  "

## 2025-03-14 NOTE — PROGRESS NOTES
03/14/25 0846   Team Meeting   Meeting Type Daily Rounds   Team Members Present   Team Members Present Physician;Nurse;   Physician Team Member Jermaine   Nursing Team Member Rosemary   Care Management Team Member Subhash   Patient/Family Present   Patient Present No   Patient's Family Present No     201. Pt is med/ meal compliant. Pt denies all symptoms. Pt is calm and cooperative. Pt attends groups and visible in the  unit. Pt will discharge once stable.

## 2025-03-14 NOTE — PLAN OF CARE
Problem: SELF HARM/SUICIDALITY  Goal: Will have no self-injury during hospital stay  Description: INTERVENTIONS:  - Q 15 MINUTES: Routine safety checks  - Q WAKING SHIFT & PRN: Assess risk to determine if routine checks are adequate to maintain patient safety  - Encourage patient to participate actively in care by formulating a plan to combat response to suicidal ideation, identify supports and resources  Outcome: Progressing     Problem: DEPRESSION  Goal: Will be euthymic at discharge  Description: INTERVENTIONS:  - Administer medication as ordered  - Provide emotional support via 1:1 interaction with staff  - Encourage involvement in milieu/groups/activities  - Monitor for social isolation  Outcome: Progressing     Problem: BEHAVIOR  Goal: Pt/Family maintain appropriate behavior and adhere to behavioral management agreement, if implemented  Description: INTERVENTIONS:  - Assess the family dynamic   - Encourage verbalization of thoughts and concerns in a socially appropriate manner  - Assess patient/family's coping skills and non-compliant behavior (including use of illegal substances).  - Utilize positive, consistent limit setting strategies supporting safety of patient, staff and others  - Initiate consult with Case Management, Spiritual Care or other ancillary services as appropriate  - If a patient's/visitor's behavior jeopardizes the safety of the patient, staff, or others, refer to organization procedure.   - Notify Security of behavior or suspected illegal substances which indicate the need for search of the patient and/or belongings  - Encourage participation in the decision making process about a behavioral management agreement; implement if patient meets criteria  Outcome: Progressing     Problem: ANXIETY  Goal: Will report anxiety at manageable levels  Description: INTERVENTIONS:  - Administer medication as ordered  - Teach and encourage coping skills  - Provide emotional support  - Assess  patient/family for anxiety and ability to cope  Outcome: Progressing  Goal: By discharge: Patient will verbalize 2 strategies to deal with anxiety  Description: Interventions:  - Identify any obvious source/trigger to anxiety  - Staff will assist patient in applying identified coping technique/skills  - Encourage attendance of scheduled groups and activities  Outcome: Progressing     Problem: Risk for Violence/Aggression Toward Others  Goal: Treatment Goal: Refrain from acts of violence/aggression during length of stay, and demonstrate improved impulse control at the time of discharge  Outcome: Progressing  Goal: Verbalize thoughts and feelings  Description: Interventions:  - Assess and re-assess patient's level of risk, every waking shift  - Engage patient in 1:1 interactions, daily, for a minimum of 15 minutes   - Allow patient to express feelings and frustrations in a safe and non-threatening manner   - Establish rapport/trust with patient   Outcome: Progressing  Goal: Refrain from harming others  Outcome: Progressing  Goal: Refrain from destructive acts on the environment or property  Outcome: Progressing  Goal: Control angry outbursts  Description: Interventions:  - Monitor patient closely, per order  - Ensure early verbal de-escalation  - Monitor prn medication needs  - Set reasonable/therapeutic limits, outline behavioral expectations, and consequences   - Provide a non-threatening milieu, utilizing the least restrictive interventions   Outcome: Progressing  Goal: Attend and participate in unit activities, including therapeutic, recreational, and educational groups  Description: Interventions:  - Provide therapeutic and educational activities daily, encourage attendance and participation, and document same in the medical record   Outcome: Progressing  Goal: Identify appropriate positive anger management techniques  Description: Interventions:  - Offer anger management and coping skills groups   - Staff will  provide positive feedback for appropriate anger control  Outcome: Progressing     Problem: Ineffective Coping  Goal: Participates in unit activities  Description: Interventions:  - Provide therapeutic environment   - Provide required programming   - Redirect inappropriate behaviors   Outcome: Progressing     Problem: DISCHARGE PLANNING - CARE MANAGEMENT  Goal: Discharge to post-acute care or home with appropriate resources  Description: INTERVENTIONS:  - Conduct assessment to determine patient/family and health care team treatment goals, and need for post-acute services based on payer coverage, community resources, and patient preferences, and barriers to discharge  - Address psychosocial, clinical, and financial barriers to discharge as identified in assessment in conjunction with the patient/family and health care team  - Arrange appropriate level of post-acute services according to patient’s   needs and preference and payer coverage in collaboration with the physician and health care team  - Communicate with and update the patient/family, physician, and health care team regarding progress on the discharge plan  - Arrange appropriate transportation to post-acute venues  Outcome: Progressing

## 2025-03-14 NOTE — NURSING NOTE
Patient is pleasant and visible. She is seen socializing with peers in the dayroom but withdraws to bed shortly after med pass. She is compliant with all nightly medications and denies all psych symptoms at this time. No unmet needs reported. Q 15 min rounding maintained for safety.

## 2025-03-15 PROCEDURE — 99232 SBSQ HOSP IP/OBS MODERATE 35: CPT | Performed by: STUDENT IN AN ORGANIZED HEALTH CARE EDUCATION/TRAINING PROGRAM

## 2025-03-15 RX ADMIN — HALOPERIDOL 1 MG: 1 TABLET ORAL at 18:15

## 2025-03-15 RX ADMIN — GABAPENTIN 100 MG: 100 CAPSULE ORAL at 21:09

## 2025-03-15 RX ADMIN — CITALOPRAM HYDROBROMIDE 20 MG: 20 TABLET ORAL at 08:13

## 2025-03-15 RX ADMIN — GABAPENTIN 100 MG: 100 CAPSULE ORAL at 08:14

## 2025-03-15 RX ADMIN — LORATADINE 10 MG: 10 TABLET ORAL at 08:14

## 2025-03-15 RX ADMIN — RISPERIDONE 2 MG: 2 TABLET, FILM COATED ORAL at 16:02

## 2025-03-15 RX ADMIN — TRAZODONE HYDROCHLORIDE 100 MG: 100 TABLET ORAL at 21:09

## 2025-03-15 RX ADMIN — Medication 2000 UNITS: at 08:13

## 2025-03-15 RX ADMIN — Medication 6 MG: at 21:08

## 2025-03-15 RX ADMIN — SENNOSIDES 8.6 MG: 8.6 TABLET, FILM COATED ORAL at 08:14

## 2025-03-15 RX ADMIN — CYANOCOBALAMIN TAB 1000 MCG 1000 MCG: 1000 TAB at 08:13

## 2025-03-15 RX ADMIN — NORGESTIMATE AND ETHINYL ESTRADIOL 1 TABLET: KIT at 08:14

## 2025-03-15 RX ADMIN — GABAPENTIN 100 MG: 100 CAPSULE ORAL at 16:02

## 2025-03-15 RX ADMIN — RISPERIDONE 2 MG: 2 TABLET, FILM COATED ORAL at 21:08

## 2025-03-15 RX ADMIN — HYDROXYZINE HYDROCHLORIDE 50 MG: 50 TABLET, FILM COATED ORAL at 18:15

## 2025-03-15 RX ADMIN — LEVOTHYROXINE SODIUM 25 MCG: 0.03 TABLET ORAL at 08:14

## 2025-03-15 RX ADMIN — RISPERIDONE 2 MG: 2 TABLET, FILM COATED ORAL at 08:14

## 2025-03-15 RX ADMIN — DIVALPROEX SODIUM 1000 MG: 500 TABLET, EXTENDED RELEASE ORAL at 21:08
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
## 2025-03-15 NOTE — PROGRESS NOTES
"Progress Note - Behavioral Health   Tameka Alvarado 30 y.o. female MRN: [id number]  Unit/Bed#: [facility] 379-01 Encounter: [id number]    All documentation, nursing notes, labs, and vitals reviewed.  The patient's medication reconciliation chart was also analyzed for medication adherence.  I personally evaluated Tameka Alvarado and discussed current care with treatment team.    No acute events overnight. Tameka is pleasant and cooperative upon approach. She reports overall improvement in mood and \"anger control\" since Depakote was optimized. Acutely, She describes her mood as \"pretty good\". She rates her overall state of MH as \"6/10\" (10 being best she ever felt, 0 being worst). We processed her inability to manage her anger outside the hospital and ways she \"flips a switch\". Supportive therapy and joint problem solving utilized. Acutely, Ave's sleep and appetite are stable. She denies current SI/HI. Her energy and motivation are satisfactory. No current crying spells, anhedonia, or hopelessness. She is future-oriented, detailing plans to \"go back to her day program\" upon discharge. Tameka currently endorses occasional and appropriate anxiety that is not pathologic in nature. Tameka denies current periods of excessive nervousness, irrational worry, or overt anxiousness. Tameka is not currently restless or tense nor does Tameka feel \"keyed up\" or on-edge. Tameka denies recent panic symptomatology. Throughout today's session, Tameka does not appear visibly perturbed. Acutely, Tameka does not exhibit objective evidence of darien/hypomania or psychosis. Ave is due for labs (VPA level) tomorrow and voiced understanding. Tameka offers no further concerns.     Mental Status Evaluation:    Appearance:  age appropriate, casually dressed, dressed appropriately, looks stated age   Behavior:  pleasant, cooperative, calm, good eye contact   Speech:  normal rate, normal volume, normal pitch   Mood:  euthymic   Affect:  normal range and intensity "   Thought Process:  organized, logical, coherent, goal directed   Associations: intact associations   Thought Content:  no overt delusions   Perceptual Disturbances: no auditory hallucinations, no visual hallucinations   Risk Potential: Suicidal ideation - None at present  Homicidal ideation - None at present  Potential for aggression - No   Sensorium:  oriented to person and place   Memory:  recent and remote memory grossly intact   Consciousness:  alert and awake    Attention: attention span and concentration are age appropriate   Insight:  fair   Judgment: fair   Gait/Station: normal gait/station   Motor Activity: no abnormal movements       Assessment/Plan/Recommended Treatment:     Assessment & Plan  Bipolar I disorder, most recent episode depressed, severe without psychotic features (HCC)    Continue Celexa 20 mg daily to help with depression and anxiety  Continue Depakote ER to 1000 mg (increased 3/13) at bedtime to help with mood stabilization  Depakote level of 51 (drawn the morning of 3/13 at 0610, so not at trough); will plan to follow up Depakote level on 3/16 (Sunday) prior to evening dose  Continue Melatonin 6 mg at bedtime to help with sleep  Continue Trazodone 100 mg at bedtime also to help with sleep   Continue Risperdal 2 mg TID to help with mood.   Continue Neurontin 100 mg tid to help with impulse control    No associated orders from this encounter found during lookback period of 72 hours.  RADHA (generalized anxiety disorder)  Management per Principal Problem    No associated orders from this encounter found during lookback period of 72 hours.  Obsessive-compulsive disorder, unspecified  Management per Principal Problem    No associated orders from this encounter found during lookback period of 72 hours.  Autism spectrum disorder  Management per Principal Problem    No associated orders from this encounter found during lookback period of 72 hours.  Gastroesophageal reflux disease without  esophagitis  Management per medical service    No associated orders from this encounter found during lookback period of 72 hours.  Hypothyroidism  Management per medical service    No associated orders from this encounter found during lookback period of 72 hours.  Medical clearance for psychiatric admission  Management per medical service    No associated orders from this encounter found during lookback period of 72 hours.  Vitamin D deficiency  Management per medical service    No associated orders from this encounter found during lookback period of 72 hours.  Vitamin B12 deficiency  Management per medical service    No associated orders from this encounter found during lookback period of 72 hours.      - Continue with pharmacotherapy, group therapy, milieu therapy and occupational therapy.    - Risks/benefits/alternatives to treatment discussed and Tameka Alvarado continues to verbalize understanding    - Will consider further optimization of psychotropic medication regimen as hospital course progresses   - Continue to assess for adverse medication side effects.  - Medical management as per SLIM recs  - Encourage Tameka Alvarado to participate in nonverbal forms of therapy including journaling and art/music therapy  - Continue precautionary safety checks.  - Continue to engage case management/SW to assist with collateral information, discharge planning, and the implementation of an individualized, patient-centered plan of care.  - The patient will be maintained on the following medications:    Current Facility-Administered Medications   Medication Dose Route Frequency Provider Last Rate    aluminum-magnesium hydroxide-simethicone  30 mL Oral Q4H PRN Bonita Murphy MD      haloperidol lactate  2.5 mg Intramuscular Q4H PRN Max 4/day Bonita Murphy MD      And    LORazepam  1 mg Intramuscular Q4H PRN Max 4/day Bonita Murphy MD      And    benztropine  0.5 mg Intramuscular Q4H PRN Max 4/day Bonita Murphy MD       haloperidol lactate  5 mg Intramuscular Q4H PRN Max 4/day Bonita Murphy MD      And    LORazepam  2 mg Intramuscular Q4H PRN Max 4/day Bonita Murphy MD      And    benztropine  1 mg Intramuscular Q4H PRN Max 4/day Bonita Murphy MD      benztropine  1 mg Intramuscular Q4H PRN Max 6/day Bonita Murphy MD      benztropine  1 mg Oral Q4H PRN Max 6/day Bonita Murphy MD      bisacodyl  10 mg Rectal Daily PRN Bonita Murphy MD      Cholecalciferol  2,000 Units Oral Daily CHRISTIANNE Hernandez      citalopram  20 mg Oral Daily Bonita Murphy MD      cyanocobalamin  1,000 mcg Oral Daily CHRISTIANNE Hernandez      hydrOXYzine HCL  50 mg Oral Q6H PRN Max 4/day Bonita Murphy MD      Or    diphenhydrAMINE  50 mg Intramuscular Q6H PRN Bonita Murphy MD      divalproex sodium  1,000 mg Oral HS Daljit Chau MD      famotidine  20 mg Oral BID PRN Love Stuart MD      gabapentin  100 mg Oral TID Love Stuart MD      haloperidol  1 mg Oral Q6H PRN Bonita Murphy MD      haloperidol  2.5 mg Oral Q4H PRN Max 4/day Bonita Murphy MD      haloperidol  5 mg Oral Q4H PRN Max 4/day Bonita Murphy MD      hydrOXYzine HCL  100 mg Oral Q6H PRN Max 4/day Bonita Murphy MD      Or    LORazepam  2 mg Intramuscular Q6H PRN Bonita Murphy MD      hydrOXYzine HCL  25 mg Oral Q6H PRN Max 4/day Bonita Murphy MD      ibuprofen  400 mg Oral Q4H PRN Bonita Murphy MD      ibuprofen  600 mg Oral Q6H PRN oBnita Murphy MD      ibuprofen  800 mg Oral Q8H PRN Bonita Murphy MD      levothyroxine  25 mcg Oral Daily oBnita Murphy MD      loratadine  10 mg Oral Daily Love Stuart MD      melatonin  6 mg Oral HS Love Stuart MD      Norgestimate-Eth Estradiol  1 tablet Oral Daily Love Stuart MD      polyethylene glycol  17 g Oral Daily PRN Bonita Murphy MD      propranolol  10 mg Oral Q8H PRN Bonita Murphy MD      risperiDONE  2 mg Oral TID  Love Stuart MD      senna  1 tablet Oral Daily MD ana paula Dykes-docusate sodium  1 tablet Oral Daily PRN Bonita Murphy MD      traZODone  100 mg Oral HS Love Stuart MD      traZODone  50 mg Oral HS PRN Bonita Murphy MD

## 2025-03-15 NOTE — ASSESSMENT & PLAN NOTE
Management per medical service    No associated orders from this encounter found during lookback period of 72 hours.

## 2025-03-15 NOTE — NURSING NOTE
"Visible in the milieu. Social with peers. Calm and cooperative. Describes their mood as \"okay\". Denies depression, anxiety, SI, HI, and hallucinations of any kind.    Medication compliant.    Encouraged hygiene and to keep attending groups today.    Staff availability reinforced.   "

## 2025-03-15 NOTE — ASSESSMENT & PLAN NOTE
Continue Celexa 20 mg daily to help with depression and anxiety  Continue Depakote ER to 1000 mg (increased 3/13) at bedtime to help with mood stabilization  Depakote level of 51 (drawn the morning of 3/13 at 0610, so not at trough); will plan to follow up Depakote level on 3/16 (Sunday) prior to evening dose  Continue Melatonin 6 mg at bedtime to help with sleep  Continue Trazodone 100 mg at bedtime also to help with sleep   Continue Risperdal 2 mg TID to help with mood.   Continue Neurontin 100 mg tid to help with impulse control    No associated orders from this encounter found during lookback period of 72 hours.

## 2025-03-15 NOTE — ASSESSMENT & PLAN NOTE
Management per Principal Problem    No associated orders from this encounter found during lookback period of 72 hours.

## 2025-03-15 NOTE — NURSING NOTE
Patient visible on the unit throughout the evening. Pleasant and cooperative with routine. Denied any unmet needs. HS medication compliant.

## 2025-03-16 LAB — VALPROATE SERPL-MCNC: 59 UG/ML (ref 50–100)

## 2025-03-16 PROCEDURE — 99232 SBSQ HOSP IP/OBS MODERATE 35: CPT | Performed by: STUDENT IN AN ORGANIZED HEALTH CARE EDUCATION/TRAINING PROGRAM

## 2025-03-16 PROCEDURE — 80164 ASSAY DIPROPYLACETIC ACD TOT: CPT | Performed by: STUDENT IN AN ORGANIZED HEALTH CARE EDUCATION/TRAINING PROGRAM

## 2025-03-16 RX ADMIN — GABAPENTIN 100 MG: 100 CAPSULE ORAL at 08:34

## 2025-03-16 RX ADMIN — Medication 2000 UNITS: at 08:33

## 2025-03-16 RX ADMIN — GABAPENTIN 100 MG: 100 CAPSULE ORAL at 15:45

## 2025-03-16 RX ADMIN — DIVALPROEX SODIUM 1000 MG: 500 TABLET, EXTENDED RELEASE ORAL at 21:01

## 2025-03-16 RX ADMIN — IBUPROFEN 600 MG: 600 TABLET, FILM COATED ORAL at 18:51

## 2025-03-16 RX ADMIN — RISPERIDONE 2 MG: 2 TABLET, FILM COATED ORAL at 08:34

## 2025-03-16 RX ADMIN — CYANOCOBALAMIN TAB 1000 MCG 1000 MCG: 1000 TAB at 08:33

## 2025-03-16 RX ADMIN — LEVOTHYROXINE SODIUM 25 MCG: 0.03 TABLET ORAL at 08:34

## 2025-03-16 RX ADMIN — RISPERIDONE 2 MG: 2 TABLET, FILM COATED ORAL at 15:45

## 2025-03-16 RX ADMIN — NORGESTIMATE AND ETHINYL ESTRADIOL 1 TABLET: KIT at 08:34

## 2025-03-16 RX ADMIN — GABAPENTIN 100 MG: 100 CAPSULE ORAL at 21:01

## 2025-03-16 RX ADMIN — CITALOPRAM HYDROBROMIDE 20 MG: 20 TABLET ORAL at 08:33

## 2025-03-16 RX ADMIN — RISPERIDONE 2 MG: 2 TABLET, FILM COATED ORAL at 21:01

## 2025-03-16 RX ADMIN — SENNOSIDES 8.6 MG: 8.6 TABLET, FILM COATED ORAL at 08:34

## 2025-03-16 RX ADMIN — TRAZODONE HYDROCHLORIDE 100 MG: 100 TABLET ORAL at 21:01

## 2025-03-16 RX ADMIN — IBUPROFEN 600 MG: 600 TABLET, FILM COATED ORAL at 08:34

## 2025-03-16 RX ADMIN — LORATADINE 10 MG: 10 TABLET ORAL at 08:34

## 2025-03-16 RX ADMIN — Medication 6 MG: at 21:01

## 2025-03-16 NOTE — PLAN OF CARE
Problem: SELF HARM/SUICIDALITY  Goal: Will have no self-injury during hospital stay  Description: INTERVENTIONS:  - Q 15 MINUTES: Routine safety checks  - Q WAKING SHIFT & PRN: Assess risk to determine if routine checks are adequate to maintain patient safety  - Encourage patient to participate actively in care by formulating a plan to combat response to suicidal ideation, identify supports and resources  3/16/2025 0532 by Nikky Garg RN  Outcome: Progressing  3/16/2025 0532 by Nikky Garg RN  Outcome: Progressing     Problem: DEPRESSION  Goal: Will be euthymic at discharge  Description: INTERVENTIONS:  - Administer medication as ordered  - Provide emotional support via 1:1 interaction with staff  - Encourage involvement in milieu/groups/activities  - Monitor for social isolation  3/16/2025 0532 by Nikky Garg RN  Outcome: Progressing  3/16/2025 0532 by Nikky Garg RN  Outcome: Progressing     Problem: BEHAVIOR  Goal: Pt/Family maintain appropriate behavior and adhere to behavioral management agreement, if implemented  Description: INTERVENTIONS:  - Assess the family dynamic   - Encourage verbalization of thoughts and concerns in a socially appropriate manner  - Assess patient/family's coping skills and non-compliant behavior (including use of illegal substances).  - Utilize positive, consistent limit setting strategies supporting safety of patient, staff and others  - Initiate consult with Case Management, Spiritual Care or other ancillary services as appropriate  - If a patient's/visitor's behavior jeopardizes the safety of the patient, staff, or others, refer to organization procedure.   - Notify Security of behavior or suspected illegal substances which indicate the need for search of the patient and/or belongings  - Encourage participation in the decision making process about a behavioral management agreement; implement if patient meets criteria  3/16/2025 0532 by Nikky Garg RN  Outcome:  Progressing  3/16/2025 0532 by Nikky Garg RN  Outcome: Progressing

## 2025-03-16 NOTE — NURSING NOTE
"Visible in the milieu. Calm and cooperative, pleasant during conversation. Describes their mood as \"fair\". Denies depression, anxiety, SI, HI, and hallucinations of any kind.     Expresses that a patient peer is upsetting them. Discussed with patient about ignoring the patient when they are upsetting them or moving to a different part of the milieu. Reminded patient to come to staff if they have any further issues, patient verbalized understanding.     Medication compliant.    Reports they are completing ADLs. Encouraged to keep going to groups.    Staff availability reinforced.   "

## 2025-03-16 NOTE — PLAN OF CARE
Problem: SELF HARM/SUICIDALITY  Goal: Will have no self-injury during hospital stay  Description: INTERVENTIONS:  - Q 15 MINUTES: Routine safety checks  - Q WAKING SHIFT & PRN: Assess risk to determine if routine checks are adequate to maintain patient safety  - Encourage patient to participate actively in care by formulating a plan to combat response to suicidal ideation, identify supports and resources  Outcome: Progressing     Problem: DEPRESSION  Goal: Will be euthymic at discharge  Description: INTERVENTIONS:  - Administer medication as ordered  - Provide emotional support via 1:1 interaction with staff  - Encourage involvement in milieu/groups/activities  - Monitor for social isolation  Outcome: Progressing     Problem: BEHAVIOR  Goal: Pt/Family maintain appropriate behavior and adhere to behavioral management agreement, if implemented  Description: INTERVENTIONS:  - Assess the family dynamic   - Encourage verbalization of thoughts and concerns in a socially appropriate manner  - Assess patient/family's coping skills and non-compliant behavior (including use of illegal substances).  - Utilize positive, consistent limit setting strategies supporting safety of patient, staff and others  - Initiate consult with Case Management, Spiritual Care or other ancillary services as appropriate  - If a patient's/visitor's behavior jeopardizes the safety of the patient, staff, or others, refer to organization procedure.   - Notify Security of behavior or suspected illegal substances which indicate the need for search of the patient and/or belongings  - Encourage participation in the decision making process about a behavioral management agreement; implement if patient meets criteria  Outcome: Progressing     Problem: ANXIETY  Goal: Will report anxiety at manageable levels  Description: INTERVENTIONS:  - Administer medication as ordered  - Teach and encourage coping skills  - Provide emotional support  - Assess  patient/family for anxiety and ability to cope  Outcome: Progressing  Goal: By discharge: Patient will verbalize 2 strategies to deal with anxiety  Description: Interventions:  - Identify any obvious source/trigger to anxiety  - Staff will assist patient in applying identified coping technique/skills  - Encourage attendance of scheduled groups and activities  Outcome: Progressing

## 2025-03-16 NOTE — PROGRESS NOTES
"Progress Note - Behavioral Health   Tameka Alvarado 30 y.o. female MRN: [id number]  Unit/Bed#: [facility] 379-01 Encounter: [id number]    All documentation, nursing notes, labs, and vitals reviewed.  The patient's medication reconciliation chart was also analyzed for medication adherence.  I personally evaluated Tameka Alvarado and discussed current care with treatment team.    No acute events overnight. Tameka is pleasant upon approach. She shares that her mood is \"good\" since she recently completed a phone call with group home staff member, Celina, who showed interest in Ave's overall well-being. As such, Ave believes she's nearing stability for discharge. Acutely, Ave's sleep and appetite are stable. She is appropriately engaging with peers and staff on the unit (coloring, listening to music). She denies SI/HI. Her energy and motivation are \"fair\". No current crying spells or anhedonia. She is more optimistic about her future currently. Tameka currently endorses occasional and appropriate anxiety that is not pathologic in nature. Tameka denies excessive nervousness, irrational worry, or overt anxiousness over the last 24 hours. Tameka denies new-onset panic symptomatology.Throughout today's session, Tameka does not appear visibly perturbed. At the moment, Tameka does not exhibit objective evidence of darien/hypomania or psychosis. Tameka is not currently loud, aggressive, impulsive, or pathologically labile. Tameka is without perceptual disturbances, such as A/V hallucinations, paranoia, ideas of reference, or delusional beliefs. No recent mood swings or emotional dysregulation. Tameka offers no further concerns.     Mental Status Evaluation:    Appearance:  age appropriate, casually dressed, dressed appropriately, overweight   Behavior:  pleasant, cooperative, calm, good eye contact   Speech:  normal rate, normal volume, normal pitch   Mood:  euthymic   Affect:  normal range and intensity   Thought Process:  organized, logical, " coherent, goal directed   Associations: intact associations   Thought Content:  no overt delusions   Perceptual Disturbances: no auditory hallucinations, no visual hallucinations   Risk Potential: Suicidal ideation - None at present  Homicidal ideation - None at present  Potential for aggression - No   Sensorium:  oriented to person, place, and time/date   Memory:  recent and remote memory grossly intact   Consciousness:  alert and awake    Attention: attention span and concentration are age appropriate   Insight:  good   Judgment: good   Gait/Station: normal gait/station   Motor Activity: no abnormal movements       Assessment/Plan/Recommended Treatment:     Assessment & Plan  Bipolar I disorder, most recent episode depressed, severe without psychotic features (HCC)    Continue Celexa 20 mg daily to help with depression and anxiety  Continue Depakote ER to 1000 mg (increased 3/13) at bedtime to help with mood stabilization  Depakote level of 51 (drawn the morning of 3/13 at 0610, so not at trough); will plan to follow up Depakote level on 3/16 (Sunday) prior to evening dose  Continue Melatonin 6 mg at bedtime to help with sleep  Continue Trazodone 100 mg at bedtime also to help with sleep   Continue Risperdal 2 mg TID to help with mood.   Continue Neurontin 100 mg tid to help with impulse control    No associated orders from this encounter found during lookback period of 72 hours.  RADHA (generalized anxiety disorder)  Management per Principal Problem    No associated orders from this encounter found during lookback period of 72 hours.  Obsessive-compulsive disorder, unspecified  Management per Principal Problem    No associated orders from this encounter found during lookback period of 72 hours.  Autism spectrum disorder  Management per Principal Problem    No associated orders from this encounter found during lookback period of 72 hours.  Gastroesophageal reflux disease without esophagitis  Management per medical  service    No associated orders from this encounter found during lookback period of 72 hours.  Hypothyroidism  Management per medical service    No associated orders from this encounter found during lookback period of 72 hours.  Medical clearance for psychiatric admission  Management per medical service    No associated orders from this encounter found during lookback period of 72 hours.  Vitamin D deficiency  Management per medical service    No associated orders from this encounter found during lookback period of 72 hours.  Vitamin B12 deficiency  Management per medical service    No associated orders from this encounter found during lookback period of 72 hours.      - Continue with pharmacotherapy, group therapy, milieu therapy and occupational therapy.    - Risks/benefits/alternatives to treatment discussed and Tameka Alvarado continues to verbalize understanding    - Will consider further optimization of psychotropic medication regimen as hospital course progresses   - Continue to assess for adverse medication side effects.  - Medical management as per SLIM recs  - Encourage Tameka Alvarado to participate in nonverbal forms of therapy including journaling and art/music therapy  - Continue precautionary safety checks.  - Continue to engage case management/SW to assist with collateral information, discharge planning, and the implementation of an individualized, patient-centered plan of care.  - The patient will be maintained on the following medications:    Current Facility-Administered Medications   Medication Dose Route Frequency Provider Last Rate    aluminum-magnesium hydroxide-simethicone  30 mL Oral Q4H PRN Bonita Murphy MD      haloperidol lactate  2.5 mg Intramuscular Q4H PRN Max 4/day Bonita Murphy MD      And    LORazepam  1 mg Intramuscular Q4H PRN Max 4/day Bonita Murphy MD      And    benztropine  0.5 mg Intramuscular Q4H PRN Max 4/day Bonita Murphy MD      haloperidol lactate  5 mg  Intramuscular Q4H PRN Max 4/day Bonita Murphy MD      And    LORazepam  2 mg Intramuscular Q4H PRN Max 4/day Bonita Murphy MD      And    benztropine  1 mg Intramuscular Q4H PRN Max 4/day Bonita Murphy MD      benztropine  1 mg Intramuscular Q4H PRN Max 6/day Bonita Murphy MD      benztropine  1 mg Oral Q4H PRN Max 6/day Bonita Murphy MD      bisacodyl  10 mg Rectal Daily PRN Bonita Murphy MD      Cholecalciferol  2,000 Units Oral Daily CHRISTIANNE Hernandez      citalopram  20 mg Oral Daily Bonita Murphy MD      cyanocobalamin  1,000 mcg Oral Daily CHRISTIANNE Hernandez      hydrOXYzine HCL  50 mg Oral Q6H PRN Max 4/day Bonita Murphy MD      Or    diphenhydrAMINE  50 mg Intramuscular Q6H PRN Bonita Murphy MD      divalproex sodium  1,000 mg Oral HS Daljit Chau MD      famotidine  20 mg Oral BID PRN Love Stuart MD      gabapentin  100 mg Oral TID Love Stuart MD      haloperidol  1 mg Oral Q6H PRN Bonita Murphy MD      haloperidol  2.5 mg Oral Q4H PRN Max 4/day Bonita Murphy MD      haloperidol  5 mg Oral Q4H PRN Max 4/day Bonita Murphy MD      hydrOXYzine HCL  100 mg Oral Q6H PRN Max 4/day Bonita Murphy MD      Or    LORazepam  2 mg Intramuscular Q6H PRN Bonita Murphy MD      hydrOXYzine HCL  25 mg Oral Q6H PRN Max 4/day Bonita Murphy MD      ibuprofen  400 mg Oral Q4H PRN Bonita Murphy MD      ibuprofen  600 mg Oral Q6H PRN Bonita Murphy MD      ibuprofen  800 mg Oral Q8H PRN Bonita Murphy MD      levothyroxine  25 mcg Oral Daily Bonita Murphy MD      loratadine  10 mg Oral Daily Love Stuart MD      melatonin  6 mg Oral HS Love Stuart MD      Norgestimate-Eth Estradiol  1 tablet Oral Daily Love Stuart MD      polyethylene glycol  17 g Oral Daily PRN Bonita Murphy MD      propranolol  10 mg Oral Q8H PRN Bonita Murphy MD      risperiDONE  2 mg Oral TID Love Stuart MD       senna  1 tablet Oral Daily MD ana paula Dykes-docusate sodium  1 tablet Oral Daily PRN Bonita Murphy MD      traZODone  100 mg Oral HS Love Stuart MD      traZODone  50 mg Oral HS RACHELL Murphy MD

## 2025-03-16 NOTE — NURSING NOTE
Patient remained visible on the unit throughout the evening. Pleasant and cooperative with routine. Denied any unmet needs. HS medication compliant,

## 2025-03-17 PROCEDURE — 99232 SBSQ HOSP IP/OBS MODERATE 35: CPT | Performed by: PSYCHIATRY & NEUROLOGY

## 2025-03-17 RX ADMIN — Medication 2000 UNITS: at 08:12

## 2025-03-17 RX ADMIN — RISPERIDONE 2 MG: 2 TABLET, FILM COATED ORAL at 08:12

## 2025-03-17 RX ADMIN — RISPERIDONE 2 MG: 2 TABLET, FILM COATED ORAL at 16:04

## 2025-03-17 RX ADMIN — CITALOPRAM HYDROBROMIDE 20 MG: 20 TABLET ORAL at 08:12

## 2025-03-17 RX ADMIN — IBUPROFEN 800 MG: 400 TABLET, FILM COATED ORAL at 21:20

## 2025-03-17 RX ADMIN — LEVOTHYROXINE SODIUM 25 MCG: 0.03 TABLET ORAL at 08:12

## 2025-03-17 RX ADMIN — RISPERIDONE 2 MG: 2 TABLET, FILM COATED ORAL at 21:15

## 2025-03-17 RX ADMIN — GABAPENTIN 100 MG: 100 CAPSULE ORAL at 08:11

## 2025-03-17 RX ADMIN — CYANOCOBALAMIN TAB 1000 MCG 1000 MCG: 1000 TAB at 08:12

## 2025-03-17 RX ADMIN — GABAPENTIN 100 MG: 100 CAPSULE ORAL at 16:04

## 2025-03-17 RX ADMIN — TRAZODONE HYDROCHLORIDE 100 MG: 100 TABLET ORAL at 21:16

## 2025-03-17 RX ADMIN — Medication 6 MG: at 21:16

## 2025-03-17 RX ADMIN — NORGESTIMATE AND ETHINYL ESTRADIOL 1 TABLET: KIT at 08:12

## 2025-03-17 RX ADMIN — GABAPENTIN 100 MG: 100 CAPSULE ORAL at 21:16

## 2025-03-17 RX ADMIN — DIVALPROEX SODIUM 1000 MG: 500 TABLET, EXTENDED RELEASE ORAL at 21:15

## 2025-03-17 RX ADMIN — LORATADINE 10 MG: 10 TABLET ORAL at 08:11

## 2025-03-17 RX ADMIN — IBUPROFEN 600 MG: 600 TABLET, FILM COATED ORAL at 13:09

## 2025-03-17 RX ADMIN — SENNOSIDES 8.6 MG: 8.6 TABLET, FILM COATED ORAL at 08:12

## 2025-03-17 NOTE — PROGRESS NOTES
Progress Note - Behavioral Health   Name: Tameka Alvarado 30 y.o. female I MRN: [id number]  Unit/Bed#: -01 I Date of Admission: 3/10/2025   Date of Service: 3/17/2025 I Hospital Day: 7     Assessment & Plan  Bipolar I disorder, most recent episode depressed, severe without psychotic features (HCC)  Reports improved mood. Denies current suicidal or homicidal thoughts.    Continue Celexa 20 mg daily to help with depression and anxiety  Continue Depakote ER 1000 mg (increased 3/13) at bedtime to help with mood stabilization. Depakote level is therapeutic at 59 on 3/16/25  Continue Melatonin 6 mg at bedtime to help with sleep  Continue Trazodone 100 mg at bedtime also to help with sleep   Continue Risperdal 2 mg TID to help with mood.   Continue Neurontin 100 mg tid to help with impulse control    No associated orders from this encounter found during lookback period of 72 hours.  RADHA (generalized anxiety disorder)  Management per Principal Problem    No associated orders from this encounter found during lookback period of 72 hours.  Obsessive-compulsive disorder, unspecified  Management per Principal Problem    No associated orders from this encounter found during lookback period of 72 hours.  Autism spectrum disorder  Management per Principal Problem    No associated orders from this encounter found during lookback period of 72 hours.  Gastroesophageal reflux disease without esophagitis  Management per medical service    No associated orders from this encounter found during lookback period of 72 hours.  Hypothyroidism  Management per medical service    No associated orders from this encounter found during lookback period of 72 hours.  Medical clearance for psychiatric admission  Management per medical service    No associated orders from this encounter found during lookback period of 72 hours.  Vitamin D deficiency  Management per medical service    No associated orders from this encounter found during lookback  period of 72 hours.  Vitamin B12 deficiency  Management per medical service    No associated orders from this encounter found during lookback period of 72 hours.    Current medications:  Current Facility-Administered Medications   Medication Dose Route Frequency Provider Last Rate    aluminum-magnesium hydroxide-simethicone  30 mL Oral Q4H PRN Bonita Murphy MD      haloperidol lactate  2.5 mg Intramuscular Q4H PRN Max 4/day Bonita Murphy MD      And    LORazepam  1 mg Intramuscular Q4H PRN Max 4/day Bonita Murphy MD      And    benztropine  0.5 mg Intramuscular Q4H PRN Max 4/day Bonita Murphy MD      haloperidol lactate  5 mg Intramuscular Q4H PRN Max 4/day Bonita Murphy MD      And    LORazepam  2 mg Intramuscular Q4H PRN Max 4/day Bonita Murphy MD      And    benztropine  1 mg Intramuscular Q4H PRN Max 4/day Bonita Murphy MD      benztropine  1 mg Intramuscular Q4H PRN Max 6/day Bonita Murphy MD      benztropine  1 mg Oral Q4H PRN Max 6/day Bonita Murphy MD      bisacodyl  10 mg Rectal Daily PRN Bonita Murphy MD      Cholecalciferol  2,000 Units Oral Daily CHRISTIANNE Hernandez      citalopram  20 mg Oral Daily Bonita Murphy MD      cyanocobalamin  1,000 mcg Oral Daily CHRISTIANNE Hernandez      hydrOXYzine HCL  50 mg Oral Q6H PRN Max 4/day Bonita Murphy MD      Or    diphenhydrAMINE  50 mg Intramuscular Q6H PRN Bnoita Murphy MD      divalproex sodium  1,000 mg Oral HS Daljit Chau MD      famotidine  20 mg Oral BID PRN Love Stuart MD      gabapentin  100 mg Oral TID Love Stuart MD      haloperidol  1 mg Oral Q6H PRN Bonita Murphy MD      haloperidol  2.5 mg Oral Q4H PRN Max 4/day Bonita Murphy MD      haloperidol  5 mg Oral Q4H PRN Max 4/day Bonita Murphy MD      hydrOXYzine HCL  100 mg Oral Q6H PRN Max 4/day Bonita Murphy MD      Or    LORazepam  2 mg Intramuscular Q6H PRN Bonita Murphy MD      hydrOXYzine HCL   25 mg Oral Q6H PRN Max 4/day Bonita Murphy MD      ibuprofen  400 mg Oral Q4H PRN Bonita Murphy MD      ibuprofen  600 mg Oral Q6H PRN Bonita Murphy MD      ibuprofen  800 mg Oral Q8H PRN Bonita Murphy MD      levothyroxine  25 mcg Oral Daily Bonita Murphy MD      loratadine  10 mg Oral Daily Love Stuart MD      melatonin  6 mg Oral HS Love Stuart MD      Norgestimate-Eth Estradiol  1 tablet Oral Daily Love Stuart MD      polyethylene glycol  17 g Oral Daily PRN Bonita Murphy MD      propranolol  10 mg Oral Q8H PRN Bonita Murphy MD      risperiDONE  2 mg Oral TID Love Stuart MD      senna  1 tablet Oral Daily Bonita Murphy MD      senna-docusate sodium  1 tablet Oral Daily PRN Bonita Murphy MD      traZODone  100 mg Oral HS Love Stuart MD      traZODone  50 mg Oral HS PRN Bonita Murphy MD          Risks/Benefits of Treatment:     Risks, benefits, and possible side effects of medications explained to patient and patient verbalizes understanding and agreement for treatment.  Risks of medications in pregnancy explained if female patient. Patient verbalizes understanding and agrees to notify her doctor if she becomes pregnant.    Progress Toward Goals: progressing, less anxious, less depressed, working on coping skills, discharge planning, denies current suicidal thoughts    Treatment Planning:     All current active medications have been reviewed.  Continue to monitor response to treatment and assess for potential side effects of medications.  Encourage group therapy, milieu therapy and occupational therapy  Collaboration with medical service for medical comorbidities as indicated.  Behavioral Health checks for safety monitoring.  Discharge planning.  Long Stay Certification : Not Applicable  Estimated Discharge Day: 3/20/2025   Legal Status : Voluntary 201 commitment.    Subjective     Behavior over the last 24 hours: improving.    Tameka chao  doing better today. She feels less depressed and is less anxious. She no longer has homicidal thoughts and denies current suicidal thoughts. Compliant with medications. Attends group therapy. Socializes more with peers.    Sleep: normal  Appetite: normal  Medication side effects: No  ROS: review of systems as noted above in HPI/Subjective report, denies any shortness of breath, chest pain, or abdominal pain, all other systems are negative    Objective :  Temp:  [97.8 °F (36.6 °C)-98.4 °F (36.9 °C)] 98.4 °F (36.9 °C)  HR:  [90-92] 92  BP: (122-123)/(59-69) 123/59  Resp:  [16] 16  SpO2:  [99 %] 99 %  O2 Device: None (Room air)    Mental Status Evaluation:    Appearance:  casually dressed   Behavior:  cooperative, more calm   Speech:  normal rate and volume   Mood:  less anxious, less depressed   Affect:  brighter   Thought Process:  concrete   Thought Content:  no overt delusions   Perceptual Disturbances: no auditory hallucinations, no visual hallucinations   Risk Potential: Suicidal Ideation -  None at present  Homicidal Ideation -  None at present  Potential for Aggression - No   Sensorium:  oriented to person, place, and time/date   Memory:  recent and remote memory grossly intact   Consciousness:  alert and awake   Attention/Concentration: attention span and concentration appear shorter than expected for age   Insight:  moderate   Judgment: moderate   Gait/Station: normal gait/station, normal balance   Motor Activity: no abnormal movements       Lab Results: I have reviewed the following results:  Depakote:   Lab Results   Component Value Date    VALPROICTOT 59 03/16/2025   Urinalysis   Lab Results   Component Value Date    COLORU Straw 03/12/2025    CLARITYU Clear 03/12/2025    SPECGRAV 1.010 03/12/2025    PHUR 7.0 03/12/2025    LEUKOCYTESUR 500.0 (A) 03/12/2025    NITRITE Negative 03/12/2025    PROTEIN UA Negative 03/12/2025    GLUCOSEU Negative 03/12/2025    KETONESU Negative 03/12/2025    UROBILINOGEN  Negative 03/12/2025    BILIRUBINUR Negative 03/12/2025    BLOODU Negative 03/12/2025    RBCUA None Seen 03/12/2025    WBCUA 2-4 03/12/2025    EPIS Occasional 03/12/2025    BACTERIA None Seen 03/12/2025       Administrative Statements     Counseling / Coordination of Care:   Patient's progress discussed with staff in treatment team meeting.  Medication changes reviewed with staff in treatment team meeting..    Love Stuart MD 03/17/25

## 2025-03-17 NOTE — NURSING NOTE
Pt visible on unit. Interacts well with peers and staff. Positive appetite; ate breakfast with peers. Organized, pleasant, and cooperative. Compliant with meds, states that she will be attending group today. Denies any SI/HI/AVH. No reported or observed distress.

## 2025-03-17 NOTE — NURSING NOTE
Patient remained visible on the unit throughout the evening. Cooperative with routine. Denied any unmet needs.

## 2025-03-17 NOTE — ASSESSMENT & PLAN NOTE
Management per medical service    No associated orders from this encounter found during lookback period of 72 hours.   72 y/o male from [facility], with PMHx of ESRD (T-TH-S), DM, s/p Rt foot angiogram 10/03 confirmed severe PAD and  no targets for re vascularization, vascular followed and recommended a Right BKA at that time but patient refused. Patient presented back to ED on 11/8/22 with c/o R foot pain and admitted to medicine. 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 Complete abx course IV Linezolid & Zosyn on 11/15. ID Dr. Babcock followed. PT recommends ANGELA.   Hospital course complicated by Covid + conversion on 11/13, on RA. Will likely return back to [facility] once quarantine period is completed on 11/23

## 2025-03-17 NOTE — PLAN OF CARE
Problem: SELF HARM/SUICIDALITY  Goal: Will have no self-injury during hospital stay  Description: INTERVENTIONS:  - Q 15 MINUTES: Routine safety checks  - Q WAKING SHIFT & PRN: Assess risk to determine if routine checks are adequate to maintain patient safety  - Encourage patient to participate actively in care by formulating a plan to combat response to suicidal ideation, identify supports and resources  Outcome: Progressing     Problem: DEPRESSION  Goal: Will be euthymic at discharge  Description: INTERVENTIONS:  - Administer medication as ordered  - Provide emotional support via 1:1 interaction with staff  - Encourage involvement in milieu/groups/activities  - Monitor for social isolation  Outcome: Progressing     Problem: BEHAVIOR  Goal: Pt/Family maintain appropriate behavior and adhere to behavioral management agreement, if implemented  Description: INTERVENTIONS:  - Assess the family dynamic   - Encourage verbalization of thoughts and concerns in a socially appropriate manner  - Assess patient/family's coping skills and non-compliant behavior (including use of illegal substances).  - Utilize positive, consistent limit setting strategies supporting safety of patient, staff and others  - Initiate consult with Case Management, Spiritual Care or other ancillary services as appropriate  - If a patient's/visitor's behavior jeopardizes the safety of the patient, staff, or others, refer to organization procedure.   - Notify Security of behavior or suspected illegal substances which indicate the need for search of the patient and/or belongings  - Encourage participation in the decision making process about a behavioral management agreement; implement if patient meets criteria  Outcome: Progressing     Problem: ANXIETY  Goal: Will report anxiety at manageable levels  Description: INTERVENTIONS:  - Administer medication as ordered  - Teach and encourage coping skills  - Provide emotional support  - Assess  patient/family for anxiety and ability to cope  Outcome: Progressing  Goal: By discharge: Patient will verbalize 2 strategies to deal with anxiety  Description: Interventions:  - Identify any obvious source/trigger to anxiety  - Staff will assist patient in applying identified coping technique/skills  - Encourage attendance of scheduled groups and activities  Outcome: Progressing     Problem: Risk for Violence/Aggression Toward Others  Goal: Treatment Goal: Refrain from acts of violence/aggression during length of stay, and demonstrate improved impulse control at the time of discharge  Outcome: Progressing  Goal: Verbalize thoughts and feelings  Description: Interventions:  - Assess and re-assess patient's level of risk, every waking shift  - Engage patient in 1:1 interactions, daily, for a minimum of 15 minutes   - Allow patient to express feelings and frustrations in a safe and non-threatening manner   - Establish rapport/trust with patient   Outcome: Progressing  Goal: Refrain from harming others  Outcome: Progressing  Goal: Refrain from destructive acts on the environment or property  Outcome: Progressing  Goal: Control angry outbursts  Description: Interventions:  - Monitor patient closely, per order  - Ensure early verbal de-escalation  - Monitor prn medication needs  - Set reasonable/therapeutic limits, outline behavioral expectations, and consequences   - Provide a non-threatening milieu, utilizing the least restrictive interventions   Outcome: Progressing  Goal: Attend and participate in unit activities, including therapeutic, recreational, and educational groups  Description: Interventions:  - Provide therapeutic and educational activities daily, encourage attendance and participation, and document same in the medical record   Outcome: Progressing  Goal: Identify appropriate positive anger management techniques  Description: Interventions:  - Offer anger management and coping skills groups   - Staff will  provide positive feedback for appropriate anger control  Outcome: Progressing

## 2025-03-17 NOTE — PROGRESS NOTES
03/17/25 0840   Team Meeting   Meeting Type Daily Rounds   Team Members Present   Team Members Present Physician;Nurse;   Physician Team Member Narciso   Nursing Team Member Stacie   Care Management Team Member Hema   Patient/Family Present   Patient Present No   Patient's Family Present No     Pt feeling anxious. Tearful over the weekend. Pt received blood work.

## 2025-03-17 NOTE — ASSESSMENT & PLAN NOTE
Reports improved mood. Denies current suicidal or homicidal thoughts.    Continue Celexa 20 mg daily to help with depression and anxiety  Continue Depakote ER 1000 mg (increased 3/13) at bedtime to help with mood stabilization. Depakote level is therapeutic at 59 on 3/16/25  Continue Melatonin 6 mg at bedtime to help with sleep  Continue Trazodone 100 mg at bedtime also to help with sleep   Continue Risperdal 2 mg TID to help with mood.   Continue Neurontin 100 mg tid to help with impulse control    No associated orders from this encounter found during lookback period of 72 hours.

## 2025-03-18 PROCEDURE — 99232 SBSQ HOSP IP/OBS MODERATE 35: CPT | Performed by: PSYCHIATRY & NEUROLOGY

## 2025-03-18 RX ADMIN — TRAZODONE HYDROCHLORIDE 100 MG: 100 TABLET ORAL at 21:24

## 2025-03-18 RX ADMIN — IBUPROFEN 600 MG: 600 TABLET, FILM COATED ORAL at 06:05

## 2025-03-18 RX ADMIN — RISPERIDONE 2 MG: 2 TABLET, FILM COATED ORAL at 16:54

## 2025-03-18 RX ADMIN — GABAPENTIN 100 MG: 100 CAPSULE ORAL at 16:54

## 2025-03-18 RX ADMIN — GABAPENTIN 100 MG: 100 CAPSULE ORAL at 21:25

## 2025-03-18 RX ADMIN — DIVALPROEX SODIUM 1000 MG: 500 TABLET, EXTENDED RELEASE ORAL at 21:24

## 2025-03-18 RX ADMIN — RISPERIDONE 2 MG: 2 TABLET, FILM COATED ORAL at 21:25

## 2025-03-18 RX ADMIN — Medication 6 MG: at 21:24

## 2025-03-18 RX ADMIN — LEVOTHYROXINE SODIUM 25 MCG: 0.03 TABLET ORAL at 08:14

## 2025-03-18 RX ADMIN — NORGESTIMATE AND ETHINYL ESTRADIOL 1 TABLET: KIT at 08:14

## 2025-03-18 RX ADMIN — GABAPENTIN 100 MG: 100 CAPSULE ORAL at 08:14

## 2025-03-18 RX ADMIN — SENNOSIDES 8.6 MG: 8.6 TABLET, FILM COATED ORAL at 08:13

## 2025-03-18 RX ADMIN — CITALOPRAM HYDROBROMIDE 20 MG: 20 TABLET ORAL at 08:14

## 2025-03-18 RX ADMIN — LORATADINE 10 MG: 10 TABLET ORAL at 08:14

## 2025-03-18 RX ADMIN — RISPERIDONE 2 MG: 2 TABLET, FILM COATED ORAL at 08:13

## 2025-03-18 RX ADMIN — Medication 2000 UNITS: at 08:13

## 2025-03-18 RX ADMIN — CYANOCOBALAMIN TAB 1000 MCG 1000 MCG: 1000 TAB at 08:13

## 2025-03-18 NOTE — PROGRESS NOTES
03/18/25 0841   Team Meeting   Meeting Type Daily Rounds   Team Members Present   Team Members Present Physician;Nurse;   Physician Team Member Narciso   Nursing Team Member Stacie   Care Management Team Member Hema   Patient/Family Present   Patient Present No   Patient's Family Present No     Pt calm and cooperative. Possible discharge back to group home at end of week. Angry thoughts have minimized.

## 2025-03-18 NOTE — ASSESSMENT & PLAN NOTE
As of 03/18/25: Tameka reports feeling less depressed and less anxious. She denies current suicidal or homicidal thoughts    Current medications:  Continue Celexa 20 mg daily to help with depression and anxiety  Continue Depakote ER 1000 mg (increased 3/13) at bedtime to help with mood stabilization. Depakote level is therapeutic at 59 on 3/16/25  Continue Melatonin 6 mg at bedtime to help with sleep  Continue Trazodone 100 mg at bedtime also to help with sleep   Continue Risperdal 2 mg TID to help with mood.   Continue Neurontin 100 mg tid to help with impulse control    No associated orders from this encounter found during lookback period of 72 hours.

## 2025-03-18 NOTE — ASSESSMENT & PLAN NOTE
H&P note    Assessment:   1. S/p Left femur spiral shaft fracture ORIF at [facility]    2. Prominent distal screw Left distal femur  Plan:       Findings today are consistent with status post left femur ORIF with retrograde IMN performed on 01/06/2024 with Dr. Peacock at [facility]. Imaging and prognosis was reviewed with the patient today. Incisions are well healed. Interval xrays show callus formation around the fracture, the most distal screw is backing out and is prominent in the lateral knee soft tissues. She was encouraged to continue physical therapy. Continue multi-modal pain control. She is going to [place] for a trip March 1st-12th. We discussed that the distal screw has backed out significantly and the worry is that it may put enough pressure on the skin and become a wound especially as [place] out of the country.   The patient has elected to proceed with Removal of hardware distal screw left femur. Risks and benefits of surgery to include but not limited to bleeding, infection, damage to surrounding structures, hardware failure, need for further surgery, pain, stiffness, blood clots. Informed consent was signed today in the office. The patient has met with our surgical schedulers and will be scheduled for Thursday 2/22/2024. All questions were answered.   Follow up 2 weeks after [facility] for wound check  New left femur xrays in 6 weeks (3 month visit)    Subjective:     Patient ID: Nadia Mata is a 63 y.o. female.  Chief Complaint:  HPI:  63 y.o. female presents to the office for evaluation status post  left femur ORIF performed on 01/06/2024 with Dr. Peacock at [facility] following a skiing accident. Since last visit, she has continued to work with PT with benefit. She presents ambulating with a cane. She has finished her lovenox script.     Allergy:  No Known Allergies  Medications:  all current active meds have been reviewed  Past Medical History:  No past medical history on  Management per medical service    No associated orders from this encounter found during lookback period of 72 hours.   file.  Past Surgical History:  Past Surgical History:   Procedure Laterality Date    EYE SURGERY      FINGER SURGERY      KNEE ARTHROSCOPY      WISDOM TOOTH EXTRACTION       Family History:  Family History   Problem Relation Age of Onset    Osteoporosis Mother      Social History:  Social History     Substance and Sexual Activity   Alcohol Use Yes    Comment: socially     Social History     Substance and Sexual Activity   Drug Use Never    Comment: No drug use - As per [employer]      Social History     Tobacco Use   Smoking Status Never   Smokeless Tobacco Never           ROS:  General: Per HPI  Skin: Negative, except if noted below  HEENT: Negative  Respiratory: Negative  Cardiovascular: Negative  Gastrointestinal: Negative  Urinary: Negative  Vascular: Negative  Musculoskeletal: Positive per HPI   Neurologic: Positive per HPI  Endocrine: Negative    Objective:  BP Readings from Last 1 Encounters:   01/16/24 108/65      Wt Readings from Last 1 Encounters:   02/20/24 64 kg (141 lb)     Cardiovascular:   Palpable pulses, no peripheral cyanosis, RRR     Respiratory:   non-labored respirations, no shortness of breath    Lymphatics:  no palpable lymph nodes    Gait and Station:   Ambulates with the assistance of a walker    Neurologic:   Alert and oriented times 3  Patient with normal sensation except as noted below  Deep tendon reflexes 2+ except as noted in MSK exam    Bilateral Lower Extremity:  Left Hip     Inspection: Incisions healed    Range of Motion: no pain with hip ROM, limited due to stiffness; knee ROM 3-90 degrees    Palpable screw tenting lateral soft tissues    - log roll    - Trendelenburg sign    Motor: 5/5 Q/HS/TA/GS/P    Pulses: 2+ DP / 2+ PT    SILT DP/SP/S/S/TN    Imaging:  My interpretation XR AP pelvis/ left femur: retrograde IM nail for distal femur fracture in expected position with appropriate alignment and interval callus formation. Most distal screw is backing out significantly into the  "lateral soft tissues.     BMI:   Estimated body mass index is 24.98 kg/m² as calculated from the following:    Height as of this encounter: 5' 3\" (1.6 m).    Weight as of this encounter: 64 kg (141 lb).  BSA:   Estimated body surface area is 1.67 meters squared as calculated from the following:    Height as of this encounter: 5' 3\" (1.6 m).    Weight as of this encounter: 64 kg (141 lb).           John Elena MD    "

## 2025-03-18 NOTE — NURSING NOTE
Patient c/o SIMS with requesting  for meds,  PRN Motrin 600mg given for 5/10 pain at 0605.    0657  -Re-assessed patient, stated feeling better with 3/10 pain at this time.

## 2025-03-18 NOTE — NURSING NOTE
Pt. is alert, calm and cooperative. Denies SI/HI, A/V hallucinations, depression and anxiety. Pt. Reports sleeping well. Visible around unit in personal clothing by choice. Social with peers and pleasant. Pt. is compliant with medications and denies any needs at this time.

## 2025-03-18 NOTE — PROGRESS NOTES
Progress Note - Behavioral Health   Name: Tameka Alvarado 30 y.o. female I MRN: [id number]  Unit/Bed#: -01 I Date of Admission: 3/10/2025   Date of Service: 3/18/2025 I Hospital Day: 8    Assessment & Plan  Bipolar I disorder, most recent episode depressed, severe without psychotic features (HCC)  As of 03/18/25: Tameka reports feeling less depressed and less anxious. She denies current suicidal or homicidal thoughts    Current medications:  Continue Celexa 20 mg daily to help with depression and anxiety  Continue Depakote ER 1000 mg (increased 3/13) at bedtime to help with mood stabilization. Depakote level is therapeutic at 59 on 3/16/25  Continue Melatonin 6 mg at bedtime to help with sleep  Continue Trazodone 100 mg at bedtime also to help with sleep   Continue Risperdal 2 mg TID to help with mood.   Continue Neurontin 100 mg tid to help with impulse control    No associated orders from this encounter found during lookback period of 72 hours.  RADHA (generalized anxiety disorder)  Management per Principal Problem    No associated orders from this encounter found during lookback period of 72 hours.  Obsessive-compulsive disorder, unspecified  Management per Principal Problem    No associated orders from this encounter found during lookback period of 72 hours.  Autism spectrum disorder  Management per Principal Problem    No associated orders from this encounter found during lookback period of 72 hours.  Gastroesophageal reflux disease without esophagitis  Management per medical service    No associated orders from this encounter found during lookback period of 72 hours.  Hypothyroidism  Management per medical service    No associated orders from this encounter found during lookback period of 72 hours.  Medical clearance for psychiatric admission  Management per medical service    No associated orders from this encounter found during lookback period of 72 hours.  Vitamin D deficiency  Management per medical  service    No associated orders from this encounter found during lookback period of 72 hours.  Vitamin B12 deficiency  Management per medical service    No associated orders from this encounter found during lookback period of 72 hours.    Current medications:  Current Facility-Administered Medications   Medication Dose Route Frequency Provider Last Rate    aluminum-magnesium hydroxide-simethicone  30 mL Oral Q4H PRN Bonita Murphy MD      haloperidol lactate  2.5 mg Intramuscular Q4H PRN Max 4/day Bonita Murphy MD      And    LORazepam  1 mg Intramuscular Q4H PRN Max 4/day Bonita Murphy MD      And    benztropine  0.5 mg Intramuscular Q4H PRN Max 4/day Bonita Murphy MD      haloperidol lactate  5 mg Intramuscular Q4H PRN Max 4/day Bonita Murphy MD      And    LORazepam  2 mg Intramuscular Q4H PRN Max 4/day Bonita Murphy MD      And    benztropine  1 mg Intramuscular Q4H PRN Max 4/day Bonita Murphy MD      benztropine  1 mg Intramuscular Q4H PRN Max 6/day Bonita Murphy MD      benztropine  1 mg Oral Q4H PRN Max 6/day Bonita Murphy MD      bisacodyl  10 mg Rectal Daily PRN Bonita Murphy MD      Cholecalciferol  2,000 Units Oral Daily CHRISTIANNE Hernandez      citalopram  20 mg Oral Daily Bonita Murphy MD      cyanocobalamin  1,000 mcg Oral Daily CHRISTIANNE Hernandez      hydrOXYzine HCL  50 mg Oral Q6H PRN Max 4/day Bonita Murphy MD      Or    diphenhydrAMINE  50 mg Intramuscular Q6H PRN Bonita Murphy MD      divalproex sodium  1,000 mg Oral HS Daljit Chau MD      famotidine  20 mg Oral BID PRN Love Stuart MD      gabapentin  100 mg Oral TID Love Stuart MD      haloperidol  1 mg Oral Q6H PRN Bonita Murphy MD      haloperidol  2.5 mg Oral Q4H PRN Max 4/day Bonita Murphy MD      haloperidol  5 mg Oral Q4H PRN Max 4/day Bonita Murphy MD      hydrOXYzine HCL  100 mg Oral Q6H PRN Max 4/day Bonita Murphy MD      Or     LORazepam  2 mg Intramuscular Q6H PRN Bonita Murphy MD      hydrOXYzine HCL  25 mg Oral Q6H PRN Max 4/day Bonita Murphy MD      ibuprofen  400 mg Oral Q4H PRN Bonita Murphy MD      ibuprofen  600 mg Oral Q6H PRN Bonita Murphy MD      ibuprofen  800 mg Oral Q8H PRN Bonita Murphy MD      levothyroxine  25 mcg Oral Daily Bonita Murphy MD      loratadine  10 mg Oral Daily Love Stuart MD      melatonin  6 mg Oral HS Love Stuart MD      Norgestimate-Eth Estradiol  1 tablet Oral Daily Love Stuart MD      polyethylene glycol  17 g Oral Daily PRN Bonita Murphy MD      propranolol  10 mg Oral Q8H PRN Bonita Murphy MD      risperiDONE  2 mg Oral TID Love Stuart MD      senna  1 tablet Oral Daily Bonita Murphy MD      senna-docusate sodium  1 tablet Oral Daily PRN Bonita Murphy MD      traZODone  100 mg Oral HS Love Stuart MD      traZODone  50 mg Oral HS PRN Bonita Murphy MD          Risks/Benefits of Treatment:     Risks, benefits, and possible side effects of medications explained to patient and patient verbalizes understanding and agreement for treatment.  Risks of medications in pregnancy explained if female patient. Patient verbalizes understanding and agrees to notify her doctor if she becomes pregnant.    Progress Toward Goals: progressing, less anxious, less depressed, working on coping skills, discharge planning, denies current suicidal thoughts    Treatment Planning:     All current active medications have been reviewed.  Continue to monitor response to treatment and assess for potential side effects of medications.  Encourage group therapy, milieu therapy and occupational therapy  Collaboration with medical service for medical comorbidities as indicated.  Behavioral Health checks for safety monitoring.  Possible discharge tomorrow if continues to improve.  Long Stay Certification : Not Applicable  Estimated Discharge Day: 3/20/2025    Legal Status : Voluntary 201 commitment.    Subjective     Behavior over the last 24 hours: improving.    Tameka continues to improve. She feels less depressed, less anxious, also has brighter affect. She denies current suicidal or homicidal thoughts. She feels that her anger control is also improved. Compliant with medications. Attends groups. Socializes with peers.    Sleep: normal  Appetite: normal  Medication side effects: No  ROS: review of systems as noted above in HPI/Subjective report, denies any headache, shortness of breath, or chest pain, all other systems are negative    Objective :  Temp:  [97.7 °F (36.5 °C)-98.5 °F (36.9 °C)] 97.7 °F (36.5 °C)  HR:  [77-97] 77  BP: (120-121)/(59-67) 121/59  Resp:  [16] 16  SpO2:  [98 %-99 %] 98 %  O2 Device: None (Room air)    Mental Status Evaluation:    Appearance:  age appropriate, casually dressed   Behavior:  cooperative, calm   Speech:  normal rate and volume   Mood:  less anxious, less depressed   Affect:  brighter   Thought Process:  concrete   Thought Content:  no overt delusions   Perceptual Disturbances: no auditory hallucinations, no visual hallucinations   Risk Potential: Suicidal Ideation -  None at present  Homicidal Ideation -  None at present  Potential for Aggression - No   Sensorium:  oriented to person, place, and time/date   Memory:  recent and remote memory grossly intact   Consciousness:  alert and awake   Attention/Concentration: attention span and concentration appear shorter than expected for age   Insight:  moderate   Judgment: moderate   Gait/Station: normal gait/station, normal balance   Motor Activity: no abnormal movements       Lab Results: I have reviewed the following results:  Results from the past 24 hours: No results found for this or any previous visit (from the past 24 hours).    Administrative Statements     Counseling / Coordination of Care:   Patient's progress discussed with staff in treatment team meeting.  Medication  changes reviewed with staff in treatment team meeting.  Discharge plan discussed with patient..    Love Stuart MD 03/18/25

## 2025-03-18 NOTE — NURSING NOTE
"Pt pleasant, social, visible throughout evening. Pt c/o \"on and off\" headache throughout the day today, states she believes it to be stress-related from unit acuity. RN encouraged pt to take a moment for herself throughout the day if the milieu becomes overstimulating. Pt given PRN motrin 800mg for 7/10 headache at 2120; effective. Pt denies SI/HI/AH/VH and current unmet needs. Pt is treatment and medication adherent.   "

## 2025-03-18 NOTE — CMS CERTIFICATION NOTE
Recertification: Based upon physical, mental and social evaluations, I certify that inpatient psychiatric services continue to be medically necessary for this patient for a duration of 7 midnights for the treatment of  Bipolar I disorder, most recent episode depressed, severe without psychotic features (HCC)

## 2025-03-19 PROCEDURE — 99232 SBSQ HOSP IP/OBS MODERATE 35: CPT | Performed by: PSYCHIATRY & NEUROLOGY

## 2025-03-19 RX ORDER — GABAPENTIN 300 MG/1
300 CAPSULE ORAL 3 TIMES DAILY
Status: DISCONTINUED | OUTPATIENT
Start: 2025-03-19 | End: 2025-03-22

## 2025-03-19 RX ORDER — DIVALPROEX SODIUM 500 MG/1
1500 TABLET, FILM COATED, EXTENDED RELEASE ORAL
Status: DISCONTINUED | OUTPATIENT
Start: 2025-03-19 | End: 2025-03-22

## 2025-03-19 RX ADMIN — Medication 6 MG: at 22:00

## 2025-03-19 RX ADMIN — RISPERIDONE 2 MG: 2 TABLET, FILM COATED ORAL at 22:00

## 2025-03-19 RX ADMIN — CYANOCOBALAMIN TAB 1000 MCG 1000 MCG: 1000 TAB at 08:17

## 2025-03-19 RX ADMIN — TRAZODONE HYDROCHLORIDE 100 MG: 100 TABLET ORAL at 22:00

## 2025-03-19 RX ADMIN — Medication 2000 UNITS: at 08:17

## 2025-03-19 RX ADMIN — LORATADINE 10 MG: 10 TABLET ORAL at 08:17

## 2025-03-19 RX ADMIN — NORGESTIMATE AND ETHINYL ESTRADIOL 1 TABLET: KIT at 08:17

## 2025-03-19 RX ADMIN — GABAPENTIN 300 MG: 300 CAPSULE ORAL at 22:00

## 2025-03-19 RX ADMIN — RISPERIDONE 2 MG: 2 TABLET, FILM COATED ORAL at 08:17

## 2025-03-19 RX ADMIN — SENNOSIDES 8.6 MG: 8.6 TABLET, FILM COATED ORAL at 08:17

## 2025-03-19 RX ADMIN — DIVALPROEX SODIUM 1500 MG: 500 TABLET, EXTENDED RELEASE ORAL at 22:00

## 2025-03-19 RX ADMIN — HALOPERIDOL 2.5 MG: 1 TABLET ORAL at 10:29

## 2025-03-19 RX ADMIN — GABAPENTIN 100 MG: 100 CAPSULE ORAL at 08:17

## 2025-03-19 RX ADMIN — RISPERIDONE 2 MG: 2 TABLET, FILM COATED ORAL at 17:00

## 2025-03-19 RX ADMIN — CITALOPRAM HYDROBROMIDE 20 MG: 20 TABLET ORAL at 08:17

## 2025-03-19 RX ADMIN — GABAPENTIN 300 MG: 300 CAPSULE ORAL at 17:00

## 2025-03-19 RX ADMIN — LEVOTHYROXINE SODIUM 25 MCG: 0.03 TABLET ORAL at 08:17

## 2025-03-19 NOTE — NURSING NOTE
Pt pleasant, social, visible intermittently throughout evening. Pt is interested in treatment and asks appropriate questions. Pt denies SI/HI/AH/VH and current unmet needs. Pt is treatment and medication adherent.
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
## 2025-03-19 NOTE — PROGRESS NOTES
03/19/25 0841   Team Meeting   Meeting Type Daily Rounds   Team Members Present   Team Members Present Physician;Nurse;   Physician Team Member Narciso   Nursing Team Member Stacie   Care Management Team Member Hema   Patient/Family Present   Patient Present No   Patient's Family Present No     Visible and social. Denying all psych symptoms. Med and meal compliant. Calm and compliant.

## 2025-03-19 NOTE — ASSESSMENT & PLAN NOTE
As of 03/19/25: Tameka is more agitated today and more irritable. She requested PRN Haldol to help her with those symptoms.     Current medications:  Continue Celexa 20 mg daily to help with depression and anxiety  Increase Depakote ER to 1500 mg at bedtime to help with mood stabilization (Depakote level was low therapeutic at 59 on 3/16/25). Will try to keep Depakote level higher at around 80 to 100. Will recheck Depakote level, CBC/diff and CMP on 3/22/25  Continue Melatonin 6 mg at bedtime to help with sleep  Continue Trazodone 100 mg at bedtime also to help with sleep   Continue Risperdal 2 mg TID to help with mood.   Increase Neurontin to 300 mg tid to help with impulse control    No associated orders from this encounter found during lookback period of 72 hours.

## 2025-03-19 NOTE — NURSING NOTE
Pt. pleasant and cooperative. Denies SI/HI, A/V hallucinations, depression and anxiety.  Pt. is social with peers and visible around the unit. Pt. is in personal clothing by choice. Expresses looking forward to an upcoming discharge date. Pt. care and medication compliant. Denies any needs at this time.

## 2025-03-19 NOTE — PLAN OF CARE
Problem: SELF HARM/SUICIDALITY  Goal: Will have no self-injury during hospital stay  Description: INTERVENTIONS:  - Q 15 MINUTES: Routine safety checks  - Q WAKING SHIFT & PRN: Assess risk to determine if routine checks are adequate to maintain patient safety  - Encourage patient to participate actively in care by formulating a plan to combat response to suicidal ideation, identify supports and resources  Outcome: Progressing     Problem: DEPRESSION  Goal: Will be euthymic at discharge  Description: INTERVENTIONS:  - Administer medication as ordered  - Provide emotional support via 1:1 interaction with staff  - Encourage involvement in milieu/groups/activities  - Monitor for social isolation  Outcome: Progressing     Problem: BEHAVIOR  Goal: Pt/Family maintain appropriate behavior and adhere to behavioral management agreement, if implemented  Description: INTERVENTIONS:  - Assess the family dynamic   - Encourage verbalization of thoughts and concerns in a socially appropriate manner  - Assess patient/family's coping skills and non-compliant behavior (including use of illegal substances).  - Utilize positive, consistent limit setting strategies supporting safety of patient, staff and others  - Initiate consult with Case Management, Spiritual Care or other ancillary services as appropriate  - If a patient's/visitor's behavior jeopardizes the safety of the patient, staff, or others, refer to organization procedure.   - Notify Security of behavior or suspected illegal substances which indicate the need for search of the patient and/or belongings  - Encourage participation in the decision making process about a behavioral management agreement; implement if patient meets criteria  Outcome: Progressing     Problem: ANXIETY  Goal: Will report anxiety at manageable levels  Description: INTERVENTIONS:  - Administer medication as ordered  - Teach and encourage coping skills  - Provide emotional support  - Assess  patient/family for anxiety and ability to cope  Outcome: Progressing  Goal: By discharge: Patient will verbalize 2 strategies to deal with anxiety  Description: Interventions:  - Identify any obvious source/trigger to anxiety  - Staff will assist patient in applying identified coping technique/skills  - Encourage attendance of scheduled groups and activities  Outcome: Progressing     Problem: Risk for Violence/Aggression Toward Others  Goal: Treatment Goal: Refrain from acts of violence/aggression during length of stay, and demonstrate improved impulse control at the time of discharge  Outcome: Progressing  Goal: Verbalize thoughts and feelings  Description: Interventions:  - Assess and re-assess patient's level of risk, every waking shift  - Engage patient in 1:1 interactions, daily, for a minimum of 15 minutes   - Allow patient to express feelings and frustrations in a safe and non-threatening manner   - Establish rapport/trust with patient   Outcome: Progressing  Goal: Refrain from harming others  Outcome: Progressing  Goal: Refrain from destructive acts on the environment or property  Outcome: Progressing  Goal: Control angry outbursts  Description: Interventions:  - Monitor patient closely, per order  - Ensure early verbal de-escalation  - Monitor prn medication needs  - Set reasonable/therapeutic limits, outline behavioral expectations, and consequences   - Provide a non-threatening milieu, utilizing the least restrictive interventions   Outcome: Progressing  Goal: Attend and participate in unit activities, including therapeutic, recreational, and educational groups  Description: Interventions:  - Provide therapeutic and educational activities daily, encourage attendance and participation, and document same in the medical record   Outcome: Progressing  Goal: Identify appropriate positive anger management techniques  Description: Interventions:  - Offer anger management and coping skills groups   - Staff will  provide positive feedback for appropriate anger control  Outcome: Progressing     Problem: Ineffective Coping  Goal: Participates in unit activities  Description: Interventions:  - Provide therapeutic environment   - Provide required programming   - Redirect inappropriate behaviors   Outcome: Progressing

## 2025-03-19 NOTE — PROGRESS NOTES
Progress Note - Behavioral Health   Name: Tameka Alvarado 30 y.o. female I MRN: [id number]  Unit/Bed#: -01 I Date of Admission: 3/10/2025   Date of Service: 3/19/2025 I Hospital Day: 9     Assessment & Plan  Bipolar I disorder, most recent episode depressed, severe without psychotic features (HCC)  As of 03/19/25: Tameka is more agitated today and more irritable. She requested PRN Haldol to help her with those symptoms.     Current medications:  Continue Celexa 20 mg daily to help with depression and anxiety  Increase Depakote ER to 1500 mg at bedtime to help with mood stabilization (Depakote level was low therapeutic at 59 on 3/16/25). Will try to keep Depakote level higher at around 80 to 100. Will recheck Depakote level, CBC/diff and CMP on 3/22/25  Continue Melatonin 6 mg at bedtime to help with sleep  Continue Trazodone 100 mg at bedtime also to help with sleep   Continue Risperdal 2 mg TID to help with mood.   Increase Neurontin to 300 mg tid to help with impulse control    No associated orders from this encounter found during lookback period of 72 hours.  RADHA (generalized anxiety disorder)  Management per Principal Problem    No associated orders from this encounter found during lookback period of 72 hours.  Obsessive-compulsive disorder, unspecified  Management per Principal Problem    No associated orders from this encounter found during lookback period of 72 hours.  Autism spectrum disorder  Management per Principal Problem    No associated orders from this encounter found during lookback period of 72 hours.  Gastroesophageal reflux disease without esophagitis  Management per medical service    No associated orders from this encounter found during lookback period of 72 hours.  Hypothyroidism  Management per medical service    No associated orders from this encounter found during lookback period of 72 hours.  Medical clearance for psychiatric admission  Management per medical service    No associated  orders from this encounter found during lookback period of 72 hours.  Vitamin D deficiency  Management per medical service    No associated orders from this encounter found during lookback period of 72 hours.  Vitamin B12 deficiency  Management per medical service    No associated orders from this encounter found during lookback period of 72 hours.    Current medications:  Current Facility-Administered Medications   Medication Dose Route Frequency Provider Last Rate    aluminum-magnesium hydroxide-simethicone  30 mL Oral Q4H PRN Bonita Murphy MD      haloperidol lactate  2.5 mg Intramuscular Q4H PRN Max 4/day Bonita Murphy MD      And    LORazepam  1 mg Intramuscular Q4H PRN Max 4/day Bonita Murphy MD      And    benztropine  0.5 mg Intramuscular Q4H PRN Max 4/day Bonita Murphy MD      haloperidol lactate  5 mg Intramuscular Q4H PRN Max 4/day Bonita Murphy MD      And    LORazepam  2 mg Intramuscular Q4H PRN Max 4/day Bonita Murphy MD      And    benztropine  1 mg Intramuscular Q4H PRN Max 4/day Bonita Murphy MD      benztropine  1 mg Intramuscular Q4H PRN Max 6/day Bonita Murphy MD      benztropine  1 mg Oral Q4H PRN Max 6/day Bonita Murphy MD      bisacodyl  10 mg Rectal Daily PRN Bonita Murphy MD      Cholecalciferol  2,000 Units Oral Daily CHRISTIANNE Hernandez      citalopram  20 mg Oral Daily Bonita Murphy MD      cyanocobalamin  1,000 mcg Oral Daily CHRISTIANNE Hernandez      hydrOXYzine HCL  50 mg Oral Q6H PRN Max 4/day Bonita Murphy MD      Or    diphenhydrAMINE  50 mg Intramuscular Q6H PRN Bonita Murphy MD      divalproex sodium  1,500 mg Oral HS Love Stuart MD      famotidine  20 mg Oral BID PRN Love Stuart MD      gabapentin  300 mg Oral TID Love Stuart MD      haloperidol  1 mg Oral Q6H PRN Bonita Murphy MD      haloperidol  2.5 mg Oral Q4H PRN Max 4/day Bonita Murphy MD      haloperidol  5 mg Oral Q4H PRN Max  4/day Bonita Murphy MD      hydrOXYzine HCL  100 mg Oral Q6H PRN Max 4/day Bonita Murphy MD      Or    LORazepam  2 mg Intramuscular Q6H PRN Bonita Murphy MD      hydrOXYzine HCL  25 mg Oral Q6H PRN Max 4/day Bonita Murphy MD      ibuprofen  400 mg Oral Q4H PRN Bonita Murphy MD      ibuprofen  600 mg Oral Q6H PRN Bonita Murphy MD      ibuprofen  800 mg Oral Q8H PRN Bonita Murphy MD      levothyroxine  25 mcg Oral Daily Bonita Murphy MD      loratadine  10 mg Oral Daily Love Stuart MD      melatonin  6 mg Oral HS Love Stuart MD      Norgestimate-Eth Estradiol  1 tablet Oral Daily Love Stuart MD      polyethylene glycol  17 g Oral Daily PRN Bonita Murphy MD      propranolol  10 mg Oral Q8H PRN Bonita Murphy MD      risperiDONE  2 mg Oral TID Love Stuart MD      senna  1 tablet Oral Daily Bonita Murphy MD      senna-docusate sodium  1 tablet Oral Daily PRN Bonita Murphy MD      traZODone  100 mg Oral HS Love Stuart MD      traZODone  50 mg Oral HS PRN Bonita Murphy MD          Risks/Benefits of Treatment:     Risks, benefits, and possible side effects of medications explained to patient. Patient has limited understanding of risks and benefits of treatment at this time, but agrees to take medications as prescribed.  Risks of medications in pregnancy explained if female patient. Patient verbalizes understanding and agrees to notify her doctor if she becomes pregnant.    Progress Toward Goals: regressed, more agitated, more irritable, more labile, denies current suicidal thoughts    Treatment Planning:     All current active medications have been reviewed.  Continue to monitor response to treatment and assess for potential side effects of medications.  Encourage group therapy, milieu therapy and occupational therapy  Collaboration with medical service for medical comorbidities as indicated.  Behavioral Health checks for safety  monitoring.  Long Stay Certification : Not Applicable  Estimated Discharge Day: 3/20/2025   Legal Status : Voluntary 201 commitment.    Subjective     Behavior over the last 24 hours: regressed.    Tameka is more agitated, more labile and more irritable  today. She states that some peers on the unit make her angry and she has difficulty controlling her behavior. She requested PRN Haldol. She denies current suicidal thoughts. More seclusive today. Complaint with medications.    Sleep: normal  Appetite: normal  Medication side effects: No  ROS: review of systems as noted above in HPI/Subjective report, denies any shortness of breath, chest pain, or abdominal pain, all other systems are negative    Objective :  Temp:  [97.8 °F (36.6 °C)-97.9 °F (36.6 °C)] 97.8 °F (36.6 °C)  HR:  [] 92  BP: (118-128)/(67-76) 118/67  Resp:  [16] 16  SpO2:  [97 %-99 %] 99 %  O2 Device: None (Room air)    Mental Status Evaluation:    Appearance:  casually dressed   Behavior:  somewhat agitated, limited eye contact   Speech:  normal rate and volume   Mood:  more dysphoric, more irritable, more labile   Affect:  labile   Thought Process:  concrete   Thought Content:  no overt delusions   Perceptual Disturbances: no auditory hallucinations, no visual hallucinations   Risk Potential: Suicidal Ideation -  None at present  Homicidal Ideation -  angry, hostile feelings with no homicidal plan  Potential for Aggression - Yes, due to poor impulse control   Sensorium:  oriented to person, place, and time/date   Memory:  recent and remote memory grossly intact   Consciousness:  alert and awake   Attention/Concentration: decreased attention span and decreased concentration   Insight:  limited   Judgment: limited   Gait/Station: normal gait/station, normal balance   Motor Activity: no abnormal movements       Lab Results: I have reviewed the following results:  Results from the past 24 hours: No results found for this or any previous visit (from  the past 24 hours).    Administrative Statements     Counseling / Coordination of Care:   Patient's progress discussed with staff in treatment team meeting.  Medication changes reviewed with staff in treatment team meeting.  Medication changes discussed with patient..    Love Stuart MD 03/19/25

## 2025-03-20 PROCEDURE — 99232 SBSQ HOSP IP/OBS MODERATE 35: CPT | Performed by: PSYCHIATRY & NEUROLOGY

## 2025-03-20 RX ORDER — CITALOPRAM HYDROBROMIDE 10 MG/1
10 TABLET ORAL DAILY
Status: DISCONTINUED | OUTPATIENT
Start: 2025-03-21 | End: 2025-03-25 | Stop reason: HOSPADM

## 2025-03-20 RX ADMIN — GABAPENTIN 300 MG: 300 CAPSULE ORAL at 21:19

## 2025-03-20 RX ADMIN — RISPERIDONE 2 MG: 2 TABLET, FILM COATED ORAL at 15:17

## 2025-03-20 RX ADMIN — CYANOCOBALAMIN TAB 1000 MCG 1000 MCG: 1000 TAB at 08:18

## 2025-03-20 RX ADMIN — HALOPERIDOL 5 MG: 5 TABLET ORAL at 10:11

## 2025-03-20 RX ADMIN — RISPERIDONE 2 MG: 2 TABLET, FILM COATED ORAL at 21:19

## 2025-03-20 RX ADMIN — Medication 2000 UNITS: at 08:17

## 2025-03-20 RX ADMIN — DIVALPROEX SODIUM 1500 MG: 500 TABLET, EXTENDED RELEASE ORAL at 21:19

## 2025-03-20 RX ADMIN — NORGESTIMATE AND ETHINYL ESTRADIOL 1 TABLET: KIT at 08:18

## 2025-03-20 RX ADMIN — SENNOSIDES 8.6 MG: 8.6 TABLET, FILM COATED ORAL at 08:18

## 2025-03-20 RX ADMIN — LORATADINE 10 MG: 10 TABLET ORAL at 08:18

## 2025-03-20 RX ADMIN — CITALOPRAM HYDROBROMIDE 20 MG: 20 TABLET ORAL at 08:18

## 2025-03-20 RX ADMIN — GABAPENTIN 300 MG: 300 CAPSULE ORAL at 08:18

## 2025-03-20 RX ADMIN — TRAZODONE HYDROCHLORIDE 100 MG: 100 TABLET ORAL at 21:19

## 2025-03-20 RX ADMIN — IBUPROFEN 800 MG: 400 TABLET, FILM COATED ORAL at 17:03

## 2025-03-20 RX ADMIN — Medication 3 MG: at 21:19

## 2025-03-20 RX ADMIN — RISPERIDONE 2 MG: 2 TABLET, FILM COATED ORAL at 08:18

## 2025-03-20 RX ADMIN — LEVOTHYROXINE SODIUM 25 MCG: 0.03 TABLET ORAL at 08:18

## 2025-03-20 RX ADMIN — GABAPENTIN 300 MG: 300 CAPSULE ORAL at 15:17

## 2025-03-20 NOTE — NURSING NOTE
Pt. is calm and cooperative. Denies SI/HI, A/V hallucinations, depression, anxiety.  Pt. reports sleeping well last night. Visible around unit in personal clothing and social with peers. Observed in groups this morning as well as walking around the unit. Medication and care compliant. Denies any needs at this time.

## 2025-03-20 NOTE — NURSING NOTE
Prn haldol 5mg po was effective in reducing agitation symptoms on reassessment. Pt is no longer presenting with this concern at this time.

## 2025-03-20 NOTE — ASSESSMENT & PLAN NOTE
As of 03/20/25: Tameka is still irritable today and seems somewhat agitated during the session.      Current medications:  Decrease Celexa to 10 mg daily due to increased irritability. Consider further taper if irritability persists  Continue Depakote ER 1500 mg at bedtime to help with mood stabilization   Recheck Depakote level, CBC/diff and CMP on 3/22/25  Decrease Melatonin to 3 mg at bedtime due to c/o tiredness  Continue Trazodone 100 mg at bedtime also to help with sleep   Continue Risperdal 2 mg TID to help with mood.   Continue Neurontin 300 mg tid to help with impulse control    No associated orders from this encounter found during lookback period of 72 hours.

## 2025-03-20 NOTE — NURSING NOTE
PRN Haldol 5mg PO was effective in treating severe agitation. Pt. reports no more feelings of agitation at this time.

## 2025-03-20 NOTE — PROGRESS NOTES
Progress Note - Behavioral Health   Name: Tameka Alvarado 30 y.o. female I MRN: [id number]  Unit/Bed#: -01 I Date of Admission: 3/10/2025   Date of Service: 3/20/2025 I Hospital Day: 10     Assessment & Plan  Bipolar I disorder, most recent episode depressed, severe without psychotic features (HCC)  As of 03/20/25: Tameka is still irritable today and seems somewhat agitated during the session.      Current medications:  Decrease Celexa to 10 mg daily due to increased irritability. Consider further taper if irritability persists  Continue Depakote ER 1500 mg at bedtime to help with mood stabilization   Recheck Depakote level, CBC/diff and CMP on 3/22/25  Decrease Melatonin to 3 mg at bedtime due to c/o tiredness  Continue Trazodone 100 mg at bedtime also to help with sleep   Continue Risperdal 2 mg TID to help with mood.   Continue Neurontin 300 mg tid to help with impulse control    No associated orders from this encounter found during lookback period of 72 hours.  RADHA (generalized anxiety disorder)  Management per Principal Problem    No associated orders from this encounter found during lookback period of 72 hours.  Obsessive-compulsive disorder, unspecified  Management per Principal Problem    No associated orders from this encounter found during lookback period of 72 hours.  Autism spectrum disorder  Management per Principal Problem    No associated orders from this encounter found during lookback period of 72 hours.  Gastroesophageal reflux disease without esophagitis  Management per medical service    No associated orders from this encounter found during lookback period of 72 hours.  Hypothyroidism  Management per medical service    No associated orders from this encounter found during lookback period of 72 hours.  Medical clearance for psychiatric admission  Management per medical service    No associated orders from this encounter found during lookback period of 72 hours.  Vitamin D deficiency  Management  per medical service    No associated orders from this encounter found during lookback period of 72 hours.  Vitamin B12 deficiency  Management per medical service    No associated orders from this encounter found during lookback period of 72 hours.    Current medications:  Current Facility-Administered Medications   Medication Dose Route Frequency Provider Last Rate    aluminum-magnesium hydroxide-simethicone  30 mL Oral Q4H PRN Bonita Murphy MD      haloperidol lactate  2.5 mg Intramuscular Q4H PRN Max 4/day Bonita Murphy MD      And    LORazepam  1 mg Intramuscular Q4H PRN Max 4/day Bonita Murphy MD      And    benztropine  0.5 mg Intramuscular Q4H PRN Max 4/day Bonita Murphy MD      haloperidol lactate  5 mg Intramuscular Q4H PRN Max 4/day Bonita Murphy MD      And    LORazepam  2 mg Intramuscular Q4H PRN Max 4/day Bonita Murphy MD      And    benztropine  1 mg Intramuscular Q4H PRN Max 4/day Bonita Murphy MD      benztropine  1 mg Intramuscular Q4H PRN Max 6/day Bonita Murphy MD      benztropine  1 mg Oral Q4H PRN Max 6/day Bonita Murphy MD      bisacodyl  10 mg Rectal Daily PRN Bonita Murphy MD      Cholecalciferol  2,000 Units Oral Daily CHRISTIANNE Hernandez      [START ON 3/21/2025] citalopram  10 mg Oral Daily Love Stuart MD      cyanocobalamin  1,000 mcg Oral Daily CHRISTIANNE Hernandez      hydrOXYzine HCL  50 mg Oral Q6H PRN Max 4/day Bonita Murphy MD      Or    diphenhydrAMINE  50 mg Intramuscular Q6H PRN Bonita Murphy MD      divalproex sodium  1,500 mg Oral HS Love Stuart MD      famotidine  20 mg Oral BID PRN Love Stuart MD      gabapentin  300 mg Oral TID Love Stuart MD      haloperidol  1 mg Oral Q6H PRN Bonita Murphy MD      haloperidol  2.5 mg Oral Q4H PRN Max 4/day Bonita Murphy MD      haloperidol  5 mg Oral Q4H PRN Max 4/day Bonita Murphy MD      hydrOXYzine HCL  100 mg Oral Q6H PRN Max 4/day  Bonita Murphy MD      Or    LORazepam  2 mg Intramuscular Q6H PRN Bonita Murphy MD      hydrOXYzine HCL  25 mg Oral Q6H PRN Max 4/day Bonita Murphy MD      ibuprofen  400 mg Oral Q4H PRN Bonita Murphy MD      ibuprofen  600 mg Oral Q6H PRN Bonita Murphy MD      ibuprofen  800 mg Oral Q8H PRN Bonita Murphy MD      levothyroxine  25 mcg Oral Daily Bonita Murphy MD      loratadine  10 mg Oral Daily Love Stuart MD      melatonin  3 mg Oral HS Love Sturat MD      Norgestimate-Eth Estradiol  1 tablet Oral Daily Love Stuart MD      polyethylene glycol  17 g Oral Daily PRN Bonita Murphy MD      propranolol  10 mg Oral Q8H PRN Bonita Murphy MD      risperiDONE  2 mg Oral TID Love Stuart MD      senna  1 tablet Oral Daily Bonita Murphy MD      senna-docusate sodium  1 tablet Oral Daily PRN Bonita Murphy MD      traZODone  100 mg Oral HS Love Stuart MD      traZODone  50 mg Oral HS PRN Bonita Murphy MD          Risks/Benefits of Treatment:     Risks, benefits, and possible side effects of medications explained to patient. Patient has limited understanding of risks and benefits of treatment at this time, but agrees to take medications as prescribed.  Risks of medications in pregnancy explained if female patient. Patient verbalizes understanding and agrees to notify her doctor if she becomes pregnant.    Progress Toward Goals: no significant improvement, still irritable, still labile, poor insight, denies current suicidal thoughts    Treatment Planning:     All current active medications have been reviewed.  Continue to monitor response to treatment and assess for potential side effects of medications.  Encourage group therapy, milieu therapy and occupational therapy  Collaboration with medical service for medical comorbidities as indicated.  Behavioral Health checks for safety monitoring.  Long Stay Certification : Not Applicable  Estimated  "Discharge Day: 3/25/2025   Legal Status : Voluntary 201 commitment.    Subjective     Behavior over the last 24 hours: unchanged.    Tameka is still irritable and labile today. She states that she feels angry and has difficulty controlling her feelings \"I am very agitated. I am not ready to talk about it\". She denies current suicidal thoughts. Per staff report she was upset about group yesterday and about a peer on the unit. Limited group attendance. Compliant with medications.    Sleep: normal  Appetite: normal  Medication side effects: Yes - some tiredness  ROS: review of systems as noted above in HPI/Subjective report, reports some tiredness, denies any shortness of breath or chest pain, all other systems are negative    Objective :  Temp:  [97.6 °F (36.4 °C)-98.8 °F (37.1 °C)] 97.6 °F (36.4 °C)  HR:  [87-98] 98  BP: (120-121)/(62-64) 120/64  Resp:  [16] 16  SpO2:  [99 %-100 %] 99 %  O2 Device: None (Room air)    Mental Status Evaluation:    Appearance:  casually dressed   Behavior:  cooperative, but slightly agitated, limited eye contact   Speech:  normal rate and volume   Mood:  labile, irritable   Affect:  labile   Thought Process:  concrete   Thought Content:  no overt delusions   Perceptual Disturbances: no auditory hallucinations, no visual hallucinations   Risk Potential: Suicidal Ideation -  None at present  Homicidal Ideation -  angry, hostile feelings with no homicidal plan  Potential for Aggression - Yes, due to poor impulse control   Sensorium:  oriented to person, place, and time/date   Memory:  recent and remote memory grossly intact   Consciousness:  alert and awake   Attention/Concentration: decreased attention span and decreased concentration   Insight:  limited   Judgment: limited   Gait/Station: normal gait/station, normal balance   Motor Activity: no abnormal movements       Lab Results: I have reviewed the following results:  Results from the past 24 hours: No results found for this or any " previous visit (from the past 24 hours).    Administrative Statements     Counseling / Coordination of Care:   Patient's progress discussed with staff in treatment team meeting.  Medication changes reviewed with staff in treatment team meeting..    Love Stuart MD 03/20/25

## 2025-03-20 NOTE — SOCIAL WORK
CM attended teams meeting with [facility] along with pt's current service providers to discuss discharge planning. Present on the call was this CM, Romina Hatfield, Celina Holt, Kiran Melton, Tiffani Graves, and Tasha Sexton. CM provided update to team regarding pt's progress on the unit since admission. All service providers are in agreement with prospective discharge plan and requested that medication prescriptions be sent to [facility] located at [address] [place] [address] [address], [place] [address], Fax # [phone]. Staff will coordinate pickup once discharge date and time is established.

## 2025-03-20 NOTE — PROGRESS NOTES
03/20/25 0836   Team Meeting   Meeting Type Daily Rounds   Team Members Present   Team Members Present Nurse;;Physician   Physician Team Member Narciso   Nursing Team Member [facility] Management Team Member Hema   Patient/Family Present   Patient Present No   Patient's Family Present No     Pt had some agitation yesterday. Increased Depakote. Discharge for early next week.

## 2025-03-20 NOTE — NURSING NOTE
Pt. administered PRN 5mg Haldol after reporting feelings of severe agitation. Reports that agitation is due to grievance that was filed.

## 2025-03-20 NOTE — NURSING NOTE
"Pt visible, calm, social, cooperative. Pt denies SI/HI/AH/VH and current unmet needs. Pt endorsed displeasure with one of the groups from today, and stated, \"I feel like she ignored me when I asked a question relevant to the group.\" Pt offered  and encouragement, which she thanked nurse for. Pt is treatment and medication adherent.   "

## 2025-03-21 PROCEDURE — 99232 SBSQ HOSP IP/OBS MODERATE 35: CPT | Performed by: PSYCHIATRY & NEUROLOGY

## 2025-03-21 RX ORDER — SENNOSIDES 8.6 MG
1 TABLET ORAL 2 TIMES DAILY
Status: DISCONTINUED | OUTPATIENT
Start: 2025-03-21 | End: 2025-03-25 | Stop reason: HOSPADM

## 2025-03-21 RX ADMIN — GABAPENTIN 300 MG: 300 CAPSULE ORAL at 21:20

## 2025-03-21 RX ADMIN — HYDROXYZINE HYDROCHLORIDE 50 MG: 50 TABLET, FILM COATED ORAL at 22:10

## 2025-03-21 RX ADMIN — IBUPROFEN 600 MG: 600 TABLET, FILM COATED ORAL at 22:33

## 2025-03-21 RX ADMIN — Medication 3 MG: at 21:20

## 2025-03-21 RX ADMIN — SENNOSIDES 8.6 MG: 8.6 TABLET, FILM COATED ORAL at 08:15

## 2025-03-21 RX ADMIN — LEVOTHYROXINE SODIUM 25 MCG: 0.03 TABLET ORAL at 08:15

## 2025-03-21 RX ADMIN — CITALOPRAM HYDROBROMIDE 10 MG: 10 TABLET ORAL at 08:15

## 2025-03-21 RX ADMIN — DIVALPROEX SODIUM 1500 MG: 500 TABLET, EXTENDED RELEASE ORAL at 21:19

## 2025-03-21 RX ADMIN — GABAPENTIN 300 MG: 300 CAPSULE ORAL at 16:31

## 2025-03-21 RX ADMIN — TRAZODONE HYDROCHLORIDE 100 MG: 100 TABLET ORAL at 21:20

## 2025-03-21 RX ADMIN — Medication 2000 UNITS: at 08:15

## 2025-03-21 RX ADMIN — GABAPENTIN 300 MG: 300 CAPSULE ORAL at 08:15

## 2025-03-21 RX ADMIN — LORATADINE 10 MG: 10 TABLET ORAL at 08:15

## 2025-03-21 RX ADMIN — RISPERIDONE 2 MG: 2 TABLET, FILM COATED ORAL at 08:15

## 2025-03-21 RX ADMIN — RISPERIDONE 2 MG: 2 TABLET, FILM COATED ORAL at 16:31

## 2025-03-21 RX ADMIN — RISPERIDONE 2 MG: 2 TABLET, FILM COATED ORAL at 21:20

## 2025-03-21 RX ADMIN — NORGESTIMATE AND ETHINYL ESTRADIOL 1 TABLET: KIT at 08:15

## 2025-03-21 RX ADMIN — CYANOCOBALAMIN TAB 1000 MCG 1000 MCG: 1000 TAB at 08:15

## 2025-03-21 RX ADMIN — SENNOSIDES 8.6 MG: 8.6 TABLET, FILM COATED ORAL at 17:31

## 2025-03-21 NOTE — NURSING NOTE
Patient observed visible, cooperative and calm upon approach during this shift, attending wrap group.  Denies SI/HI/VH/AH, no depression nor anxiety reported.  Compliant with HS meds, no unmet needs and Q15 minutes checks on going for safety.

## 2025-03-21 NOTE — NURSING NOTE
"Visible in the milieu. Attending groups. Calm and cooperative, pleasant this morning. Describes their mood as \"good\". Denies depression, anxiety, SI, HI, and hallucinations of any kind.    Medication compliant.    Encouraged hygiene and to keep attending groups.    Staff availability reinforced.   "

## 2025-03-21 NOTE — PROGRESS NOTES
Progress Note - Behavioral Health   Name: Tameka Alvarado 30 y.o. female I MRN: [id number]  Unit/Bed#: -01 I Date of Admission: 3/10/2025   Date of Service: 3/21/2025 I Hospital Day: 11    Assessment & Plan  Bipolar I disorder, most recent episode depressed, severe without psychotic features (HCC)  As of 03/21/25: Tameka is less irritable today, has brighter affect and seems less agitated      Current medications:  Continue Celexa 10 mg daily to control anxiety and depressive thoughts. Celexa dose was decreased yesterday - monitor if irritability continues to improve  Continue Depakote ER 1500 mg at bedtime to help with mood stabilization   Recheck Depakote level, CBC/diff and CMP on 3/22/25  Continue Melatonin 3 mg at bedtime to help with sleep - tiredness is resolved  Continue Trazodone 100 mg at bedtime also to help with sleep   Continue Risperdal 2 mg TID to help with mood.   Continue Neurontin 300 mg tid to help with impulse control    No associated orders from this encounter found during lookback period of 72 hours.  RADHA (generalized anxiety disorder)  Management per Principal Problem    No associated orders from this encounter found during lookback period of 72 hours.  Obsessive-compulsive disorder, unspecified  Management per Principal Problem    No associated orders from this encounter found during lookback period of 72 hours.  Autism spectrum disorder  Management per Principal Problem    No associated orders from this encounter found during lookback period of 72 hours.  Gastroesophageal reflux disease without esophagitis  Management per medical service    No associated orders from this encounter found during lookback period of 72 hours.  Hypothyroidism  Management per medical service    No associated orders from this encounter found during lookback period of 72 hours.  Medical clearance for psychiatric admission  Management per medical service    No associated orders from this encounter found during  lookback period of 72 hours.  Vitamin D deficiency  Management per medical service    No associated orders from this encounter found during lookback period of 72 hours.  Vitamin B12 deficiency  Management per medical service    No associated orders from this encounter found during lookback period of 72 hours.      Current Facility-Administered Medications:     aluminum-magnesium hydroxide-simethicone (MAALOX) oral suspension 30 mL, 30 mL, Oral, Q4H PRN, Bonita Murphy MD    haloperidol lactate (HALDOL) injection 2.5 mg, 2.5 mg, Intramuscular, Q4H PRN Max 4/day **AND** LORazepam (ATIVAN) injection 1 mg, 1 mg, Intramuscular, Q4H PRN Max 4/day **AND** benztropine (COGENTIN) injection 0.5 mg, 0.5 mg, Intramuscular, Q4H PRN Max 4/day, Bonita Murphy MD    haloperidol lactate (HALDOL) injection 5 mg, 5 mg, Intramuscular, Q4H PRN Max 4/day **AND** LORazepam (ATIVAN) injection 2 mg, 2 mg, Intramuscular, Q4H PRN Max 4/day **AND** benztropine (COGENTIN) injection 1 mg, 1 mg, Intramuscular, Q4H PRN Max 4/day, Bonita Murphy MD    benztropine (COGENTIN) injection 1 mg, 1 mg, Intramuscular, Q4H PRN Max 6/day, Bonita Murphy MD    benztropine (COGENTIN) tablet 1 mg, 1 mg, Oral, Q4H PRN Max 6/day, Bonita Murphy MD    bisacodyl (DULCOLAX) rectal suppository 10 mg, 10 mg, Rectal, Daily PRN, Bonita Murphy MD    Cholecalciferol (VITAMIN D3) tablet 2,000 Units, 2,000 Units, Oral, Daily, CHRISTIANNE Hernandez, 2,000 Units at 03/21/25 0815    citalopram (CeleXA) tablet 10 mg, 10 mg, Oral, Daily, Love Stuart MD, 10 mg at 03/21/25 0815    cyanocobalamin (VITAMIN B-12) tablet 1,000 mcg, 1,000 mcg, Oral, Daily, CHRISTIANNE Hernandez, 1,000 mcg at 03/21/25 0815    hydrOXYzine HCL (ATARAX) tablet 50 mg, 50 mg, Oral, Q6H PRN Max 4/day, 50 mg at 03/15/25 1815 **OR** diphenhydrAMINE (BENADRYL) injection 50 mg, 50 mg, Intramuscular, Q6H PRN, Bonita Murphy MD    divalproex sodium (DEPAKOTE ER) 24 hr  tablet 1,500 mg, 1,500 mg, Oral, HS, Love Stuart MD, 1,500 mg at 03/20/25 2119    famotidine (PEPCID) tablet 20 mg, 20 mg, Oral, BID PRN, Love Stuart MD    gabapentin (NEURONTIN) capsule 300 mg, 300 mg, Oral, TID, Love Stuart MD, 300 mg at 03/21/25 0815    haloperidol (HALDOL) tablet 1 mg, 1 mg, Oral, Q6H PRN, Bonita Murphy MD, 1 mg at 03/15/25 1815    haloperidol (HALDOL) tablet 2.5 mg, 2.5 mg, Oral, Q4H PRN Max 4/day, Bonita Murphy MD, 2.5 mg at 03/19/25 1029    haloperidol (HALDOL) tablet 5 mg, 5 mg, Oral, Q4H PRN Max 4/day, Bonita Murphy MD, 5 mg at 03/20/25 1011    hydrOXYzine HCL (ATARAX) tablet 100 mg, 100 mg, Oral, Q6H PRN Max 4/day, 100 mg at 03/14/25 1150 **OR** LORazepam (ATIVAN) injection 2 mg, 2 mg, Intramuscular, Q6H PRN, Bonita Murphy MD    hydrOXYzine HCL (ATARAX) tablet 25 mg, 25 mg, Oral, Q6H PRN Max 4/day, Bonita Murphy MD    ibuprofen (MOTRIN) tablet 400 mg, 400 mg, Oral, Q4H PRN, Bonita Murphy MD, 400 mg at 03/10/25 2158    ibuprofen (MOTRIN) tablet 600 mg, 600 mg, Oral, Q6H PRN, Bonita Murphy MD, 600 mg at 03/18/25 0605    ibuprofen (MOTRIN) tablet 800 mg, 800 mg, Oral, Q8H PRN, Bonita Murphy MD, 800 mg at 03/20/25 1703    levothyroxine tablet 25 mcg, 25 mcg, Oral, Daily, Bonita Murphy MD, 25 mcg at 03/21/25 0815    loratadine (CLARITIN) tablet 10 mg, 10 mg, Oral, Daily, Love Stuart MD, 10 mg at 03/21/25 0815    melatonin tablet 3 mg, 3 mg, Oral, HS, Loev Stuart MD, 3 mg at 03/20/25 2119    Norgestimate-Eth Estradiol 0.18/0.215/0.25 MG-25 MCG TABS 1 tablet, 1 tablet, Oral, Daily, Love Stuart MD, 1 tablet at 03/21/25 0815    polyethylene glycol (MIRALAX) packet 17 g, 17 g, Oral, Daily PRN, Bonita Murphy MD    propranolol (INDERAL) tablet 10 mg, 10 mg, Oral, Q8H PRN, Bonita Murphy MD    risperiDONE (RisperDAL) tablet 2 mg, 2 mg, Oral, TID, Love Stuart MD, 2 mg at 03/21/25 0815    senna (SENOKOT) tablet  "8.6 mg, 1 tablet, Oral, BID, Love Stuart MD    senna-docusate sodium (SENOKOT S) 8.6-50 mg per tablet 1 tablet, 1 tablet, Oral, Daily PRN, Bonita Murphy MD    traZODone (DESYREL) tablet 100 mg, 100 mg, Oral, HS, Love Stuart MD, 100 mg at 03/20/25 2119    traZODone (DESYREL) tablet 50 mg, 50 mg, Oral, HS PRN, Bonita Murphy MD, 50 mg at 03/10/25 2122    Risks/Benefits of Treatment:     Risks, benefits, and possible side effects of medications explained to patient. Patient has limited understanding of risks and benefits of treatment at this time, but agrees to take medications as prescribed.  Risks of medications in pregnancy explained if female patient. Patient verbalizes understanding and agrees to notify her doctor if she becomes pregnant.    Progress Toward Goals: some progress, less agitated, still somewhat anxious, less irritable, working on coping skills, denies current suicidal thoughts, denies homicidal thoughts    Treatment Planning:     All current active medications have been reviewed.  Continue to monitor response to treatment and assess for potential side effects of medications.  Encourage group therapy, milieu therapy and occupational therapy  Collaboration with medical service for medical comorbidities as indicated.  Behavioral Health checks for safety monitoring.  Observe progress over the weekend.  Long Stay Certification : Not Applicable  Estimated Discharge Day: 3/25/2025   Legal Status : Voluntary 201 commitment.    Subjective     Behavior over the last 24 hours: some improvement.    Tameka is more cooperative, also seems less irritable and not agitated during the session today. She went to groups and was appropriate. She denies current suicidal or homicidal thoughts, has slightly brighter affect \"I don't feel anger. It is a better day\". Compliant with medications. She has been talking to select peers.    Sleep: normal  Appetite: normal  Medication side effects: Yes - " constipation  ROS: review of systems as noted above in HPI/Subjective report, reports constipation, denies any headache, shortness of breath, or chest pain, all other systems are negative    Objective :  Temp:  [98.9 °F (37.2 °C)-99.2 °F (37.3 °C)] 98.9 °F (37.2 °C)  HR:  [94-95] 94  BP: (111-121)/(64-68) 111/64  Resp:  [16] 16  SpO2:  [97 %-99 %] 97 %  O2 Device: None (Room air)    Mental Status Evaluation:    Appearance:  casually dressed   Behavior:  cooperative, less agitated, slightly better eye contact   Speech:  normal rate and volume   Mood:  less irritable, less labile   Affect:  less labile   Thought Process:  concrete   Thought Content:  no overt delusions   Perceptual Disturbances: no auditory hallucinations, no visual hallucinations   Risk Potential: Suicidal Ideation -  None at present  Homicidal Ideation -  None at present  Potential for Aggression - Not at present   Sensorium:  oriented to person, place, and time/date   Memory:  recent and remote memory grossly intact   Consciousness:  alert and awake   Attention/Concentration: attention span and concentration appear shorter than expected for age   Insight:  limited   Judgment: limited   Gait/Station: normal gait/station, normal balance   Motor Activity: no abnormal movements       Lab Results: I have reviewed the following results:  Results from the past 24 hours: No results found for this or any previous visit (from the past 24 hours).    Administrative Statements     Counseling / Coordination of Care:   Patient's progress discussed with staff in treatment team meeting.  Medication changes reviewed with staff in treatment team meeting..    Love Stuart MD 03/21/25

## 2025-03-21 NOTE — ASSESSMENT & PLAN NOTE
As of 03/21/25: Tameka is less irritable today, has brighter affect and seems less agitated      Current medications:  Continue Celexa 10 mg daily to control anxiety and depressive thoughts. Celexa dose was decreased yesterday - monitor if irritability continues to improve  Continue Depakote ER 1500 mg at bedtime to help with mood stabilization   Recheck Depakote level, CBC/diff and CMP on 3/22/25  Continue Melatonin 3 mg at bedtime to help with sleep - tiredness is resolved  Continue Trazodone 100 mg at bedtime also to help with sleep   Continue Risperdal 2 mg TID to help with mood.   Continue Neurontin 300 mg tid to help with impulse control    No associated orders from this encounter found during lookback period of 72 hours.

## 2025-03-21 NOTE — PROGRESS NOTES
03/21/25 0841   Team Meeting   Meeting Type Daily Rounds   Team Members Present   Team Members Present Physician;Nurse;   Physician Team Member Narciso   Nursing Team Member Jamir   Care Management Team Member Hema   Patient/Family Present   Patient Present No   Patient's Family Present No     Pt was feeling agitated. Received PRN. Denying all symptoms. Calm and cooperative. Med and meal compliant

## 2025-03-22 LAB
ALBUMIN SERPL BCG-MCNC: 4.7 G/DL (ref 3.5–5)
ALP SERPL-CCNC: 73 U/L (ref 34–104)
ALT SERPL W P-5'-P-CCNC: 26 U/L (ref 7–52)
ANION GAP SERPL CALCULATED.3IONS-SCNC: 10 MMOL/L (ref 4–13)
AST SERPL W P-5'-P-CCNC: 19 U/L (ref 13–39)
BASOPHILS # BLD AUTO: 0.05 THOUSANDS/ÂΜL (ref 0–0.1)
BASOPHILS NFR BLD AUTO: 1 % (ref 0–1)
BILIRUB SERPL-MCNC: 0.23 MG/DL (ref 0.2–1)
BUN SERPL-MCNC: 10 MG/DL (ref 5–25)
CALCIUM SERPL-MCNC: 9.9 MG/DL (ref 8.4–10.2)
CHLORIDE SERPL-SCNC: 95 MMOL/L (ref 96–108)
CO2 SERPL-SCNC: 30 MMOL/L (ref 21–32)
CREAT SERPL-MCNC: 0.75 MG/DL (ref 0.6–1.3)
EOSINOPHIL # BLD AUTO: 0.03 THOUSAND/ÂΜL (ref 0–0.61)
EOSINOPHIL NFR BLD AUTO: 0 % (ref 0–6)
ERYTHROCYTE [DISTWIDTH] IN BLOOD BY AUTOMATED COUNT: 12.2 % (ref 11.6–15.1)
GFR SERPL CREATININE-BSD FRML MDRD: 105 ML/MIN/1.73SQ M
GLUCOSE SERPL-MCNC: 107 MG/DL (ref 65–140)
HCT VFR BLD AUTO: 42 % (ref 34.8–46.1)
HGB BLD-MCNC: 13.8 G/DL (ref 11.5–15.4)
IMM GRANULOCYTES # BLD AUTO: 0.03 THOUSAND/UL (ref 0–0.2)
IMM GRANULOCYTES NFR BLD AUTO: 0 % (ref 0–2)
LYMPHOCYTES # BLD AUTO: 2.45 THOUSANDS/ÂΜL (ref 0.6–4.47)
LYMPHOCYTES NFR BLD AUTO: 32 % (ref 14–44)
MCH RBC QN AUTO: 31.5 PG (ref 26.8–34.3)
MCHC RBC AUTO-ENTMCNC: 32.9 G/DL (ref 31.4–37.4)
MCV RBC AUTO: 96 FL (ref 82–98)
MONOCYTES # BLD AUTO: 0.43 THOUSAND/ÂΜL (ref 0.17–1.22)
MONOCYTES NFR BLD AUTO: 6 % (ref 4–12)
NEUTROPHILS # BLD AUTO: 4.64 THOUSANDS/ÂΜL (ref 1.85–7.62)
NEUTS SEG NFR BLD AUTO: 61 % (ref 43–75)
NRBC BLD AUTO-RTO: 0 /100 WBCS
PLATELET # BLD AUTO: 221 THOUSANDS/UL (ref 149–390)
PMV BLD AUTO: 9 FL (ref 8.9–12.7)
POTASSIUM SERPL-SCNC: 4.4 MMOL/L (ref 3.5–5.3)
PROT SERPL-MCNC: 7.6 G/DL (ref 6.4–8.4)
RBC # BLD AUTO: 4.38 MILLION/UL (ref 3.81–5.12)
SODIUM SERPL-SCNC: 135 MMOL/L (ref 135–147)
VALPROATE SERPL-MCNC: 87 UG/ML (ref 50–100)
WBC # BLD AUTO: 7.63 THOUSAND/UL (ref 4.31–10.16)

## 2025-03-22 PROCEDURE — 85025 COMPLETE CBC W/AUTO DIFF WBC: CPT | Performed by: PSYCHIATRY & NEUROLOGY

## 2025-03-22 PROCEDURE — 99232 SBSQ HOSP IP/OBS MODERATE 35: CPT | Performed by: PSYCHIATRY & NEUROLOGY

## 2025-03-22 PROCEDURE — 80053 COMPREHEN METABOLIC PANEL: CPT | Performed by: PSYCHIATRY & NEUROLOGY

## 2025-03-22 PROCEDURE — 80164 ASSAY DIPROPYLACETIC ACD TOT: CPT | Performed by: PSYCHIATRY & NEUROLOGY

## 2025-03-22 RX ORDER — DIVALPROEX SODIUM 500 MG/1
1500 TABLET, FILM COATED, EXTENDED RELEASE ORAL
Status: DISCONTINUED | OUTPATIENT
Start: 2025-03-22 | End: 2025-03-25 | Stop reason: HOSPADM

## 2025-03-22 RX ORDER — GABAPENTIN 300 MG/1
300 CAPSULE ORAL 3 TIMES DAILY
Status: DISCONTINUED | OUTPATIENT
Start: 2025-03-22 | End: 2025-03-25 | Stop reason: HOSPADM

## 2025-03-22 RX ORDER — RISPERIDONE 2 MG/1
2 TABLET ORAL 3 TIMES DAILY
Status: DISCONTINUED | OUTPATIENT
Start: 2025-03-22 | End: 2025-03-25 | Stop reason: HOSPADM

## 2025-03-22 RX ORDER — TRAZODONE HYDROCHLORIDE 100 MG/1
100 TABLET ORAL
Status: DISCONTINUED | OUTPATIENT
Start: 2025-03-22 | End: 2025-03-25 | Stop reason: HOSPADM

## 2025-03-22 RX ADMIN — GABAPENTIN 300 MG: 300 CAPSULE ORAL at 08:12

## 2025-03-22 RX ADMIN — RISPERIDONE 2 MG: 2 TABLET, FILM COATED ORAL at 08:12

## 2025-03-22 RX ADMIN — CITALOPRAM HYDROBROMIDE 10 MG: 10 TABLET ORAL at 08:12

## 2025-03-22 RX ADMIN — RISPERIDONE 2 MG: 2 TABLET, FILM COATED ORAL at 19:17

## 2025-03-22 RX ADMIN — GABAPENTIN 300 MG: 300 CAPSULE ORAL at 19:17

## 2025-03-22 RX ADMIN — CYANOCOBALAMIN TAB 1000 MCG 1000 MCG: 1000 TAB at 08:12

## 2025-03-22 RX ADMIN — HYDROXYZINE HYDROCHLORIDE 100 MG: 50 TABLET, FILM COATED ORAL at 21:27

## 2025-03-22 RX ADMIN — LORATADINE 10 MG: 10 TABLET ORAL at 08:12

## 2025-03-22 RX ADMIN — IBUPROFEN 600 MG: 600 TABLET, FILM COATED ORAL at 19:36

## 2025-03-22 RX ADMIN — Medication 2000 UNITS: at 08:12

## 2025-03-22 RX ADMIN — RISPERIDONE 2 MG: 2 TABLET, FILM COATED ORAL at 15:53

## 2025-03-22 RX ADMIN — DIVALPROEX SODIUM 1500 MG: 500 TABLET, EXTENDED RELEASE ORAL at 19:17

## 2025-03-22 RX ADMIN — LEVOTHYROXINE SODIUM 25 MCG: 0.03 TABLET ORAL at 08:12

## 2025-03-22 RX ADMIN — Medication 3 MG: at 19:17

## 2025-03-22 RX ADMIN — SENNOSIDES 8.6 MG: 8.6 TABLET, FILM COATED ORAL at 08:12

## 2025-03-22 RX ADMIN — NORGESTIMATE AND ETHINYL ESTRADIOL 1 TABLET: KIT at 08:12

## 2025-03-22 RX ADMIN — GABAPENTIN 300 MG: 300 CAPSULE ORAL at 15:53

## 2025-03-22 RX ADMIN — SENNOSIDES 8.6 MG: 8.6 TABLET, FILM COATED ORAL at 17:13

## 2025-03-22 RX ADMIN — TRAZODONE HYDROCHLORIDE 100 MG: 100 TABLET ORAL at 19:17

## 2025-03-22 NOTE — NURSING NOTE
"Patient remained visible on the unit throughout the evening. Pleasant and cooperative with routine. Denied any unmet needs. HS medication compliant. Requested and received atarax 50 mg po prn at 2210 \"I have a lot of anxiety and I feel restless. It's good anxiety. I am excited to maybe be leaving Monday or Tuesday.\"    At 2233, patient reported a moderate HS. Motrin 600 mg po prn given.    At 2310, patient resting in bed with her eyes closed. Interventions noted above were effective.   "

## 2025-03-22 NOTE — ASSESSMENT & PLAN NOTE
As of 03/22/25: Tameka is less irritable today, has brighter affect and seems less agitated. She notes at home she likes to take HS medications at 7p as she goes to bed at 730p and that she does not like having to stay up as this is not her routine and what she will return to at home. Asked to adjust timing of medications. Will Do that (3/22/2025)     Current medications:  Continue Celexa 10 mg daily to control anxiety and depressive thoughts. Celexa dose had been decreased 3/21/25- monitor if irritability continues to improve  Continue Depakote ER 1500 mg at bedtime to help with mood stabilization   Recheck Depakote level, CBC/diff and CMP on 3/22/25  Continue Melatonin 3 mg at bedtime to help with sleep - tiredness is resolved  Continue Trazodone 100 mg at bedtime also to help with sleep   Continue Risperdal 2 mg TID to help with mood.   Continue Neurontin 300 mg tid to help with impulse control    No associated orders from this encounter found during lookback period of 72 hours.

## 2025-03-22 NOTE — NURSING NOTE
"Visible in the milieu. Pleasant and calm during conversation. Expresses wanting to set up a video call with their group home Monday. Describes their mood as \"pretty good\". Reports sleeping well. Denies depression, anxiety, SI, HI, and hallucinations of any kind.     Medication compliant.    Encouraged ADLs and to keep attending groups.    Staff availability reinforced.   "

## 2025-03-22 NOTE — PROGRESS NOTES
Progress Note - Behavioral Health   Name: Tameka Alvarado 30 y.o. female I MRN: [id number]  Unit/Bed#: -01 I Date of Admission: 3/10/2025   Date of Service: 3/22/2025 I Hospital Day: 12    Assessment & Plan  Bipolar I disorder, most recent episode depressed, severe without psychotic features (HCC)  As of 03/22/25: Tameka is less irritable today, has brighter affect and seems less agitated. She notes at home she likes to take HS medications at 7p as she goes to bed at 730p and that she does not like having to stay up as this is not her routine and what she will return to at home. Asked to adjust timing of medications. Will Do that (3/22/2025)     Current medications:  Continue Celexa 10 mg daily to control anxiety and depressive thoughts. Celexa dose had been decreased 3/21/25- monitor if irritability continues to improve  Continue Depakote ER 1500 mg at bedtime to help with mood stabilization   Recheck Depakote level, CBC/diff and CMP on 3/22/25  Continue Melatonin 3 mg at bedtime to help with sleep - tiredness is resolved  Continue Trazodone 100 mg at bedtime also to help with sleep   Continue Risperdal 2 mg TID to help with mood.   Continue Neurontin 300 mg tid to help with impulse control    No associated orders from this encounter found during lookback period of 72 hours.  RADHA (generalized anxiety disorder)  Management per Principal Problem    No associated orders from this encounter found during lookback period of 72 hours.  Obsessive-compulsive disorder, unspecified  Management per Principal Problem    No associated orders from this encounter found during lookback period of 72 hours.  Autism spectrum disorder  Management per Principal Problem    No associated orders from this encounter found during lookback period of 72 hours.  Gastroesophageal reflux disease without esophagitis  Management per medical service    No associated orders from this encounter found during lookback period of 72  hours.  Hypothyroidism  Management per medical service    No associated orders from this encounter found during lookback period of 72 hours.  Medical clearance for psychiatric admission  Management per medical service    No associated orders from this encounter found during lookback period of 72 hours.  Vitamin D deficiency  Management per medical service    No associated orders from this encounter found during lookback period of 72 hours.  Vitamin B12 deficiency  Management per medical service    No associated orders from this encounter found during lookback period of 72 hours.    Current medications:  Current Facility-Administered Medications   Medication Dose Route Frequency Provider Last Rate    aluminum-magnesium hydroxide-simethicone  30 mL Oral Q4H PRN Bonita Murphy MD      haloperidol lactate  2.5 mg Intramuscular Q4H PRN Max 4/day Bonita Murphy MD      And    LORazepam  1 mg Intramuscular Q4H PRN Max 4/day Bonita Murphy MD      And    benztropine  0.5 mg Intramuscular Q4H PRN Max 4/day Bonita Murphy MD      haloperidol lactate  5 mg Intramuscular Q4H PRN Max 4/day Bonita Murphy MD      And    LORazepam  2 mg Intramuscular Q4H PRN Max 4/day Bonita Murphy MD      And    benztropine  1 mg Intramuscular Q4H PRN Max 4/day Bonita Murphy MD      benztropine  1 mg Intramuscular Q4H PRN Max 6/day Bonita Murphy MD      benztropine  1 mg Oral Q4H PRN Max 6/day Bonita Murphy MD      bisacodyl  10 mg Rectal Daily PRN Bonita Murphy MD      Cholecalciferol  2,000 Units Oral Daily CHRISTIANNE Hernandez      citalopram  10 mg Oral Daily Love Stuart MD      cyanocobalamin  1,000 mcg Oral Daily CHRISTIANNE Hernandez      hydrOXYzine HCL  50 mg Oral Q6H PRN Max 4/day Bonita Murphy MD      Or    diphenhydrAMINE  50 mg Intramuscular Q6H PRN Bonita Murphy MD      divalproex sodium  1,500 mg Oral HS Love Stuart MD      famotidine  20 mg Oral BID PRN  Love Stuart MD      gabapentin  300 mg Oral TID Love Stuart MD      haloperidol  1 mg Oral Q6H PRN Bonita Murphy MD      haloperidol  2.5 mg Oral Q4H PRN Max 4/day Bonita Murphy MD      haloperidol  5 mg Oral Q4H PRN Max 4/day Bonita Murphy MD      hydrOXYzine HCL  100 mg Oral Q6H PRN Max 4/day Bonita Murphy MD      Or    LORazepam  2 mg Intramuscular Q6H PRN Bonita Murphy MD      hydrOXYzine HCL  25 mg Oral Q6H PRN Max 4/day Bonita Murphy MD      ibuprofen  400 mg Oral Q4H PRN Bonita Murphy MD      ibuprofen  600 mg Oral Q6H PRN Bonita Murphy MD      ibuprofen  800 mg Oral Q8H PRN Bonita Murphy MD      levothyroxine  25 mcg Oral Daily Bonita Murphy MD      loratadine  10 mg Oral Daily Love Stuart MD      melatonin  3 mg Oral HS Love Stuart MD      Norgestimate-Eth Estradiol  1 tablet Oral Daily Love Stuart MD      polyethylene glycol  17 g Oral Daily PRN Bonita Murphy MD      propranolol  10 mg Oral Q8H PRN Bonita Murphy MD      risperiDONE  2 mg Oral TID Love Stuart MD      senna  1 tablet Oral BID Love Stuart MD      senna-docusate sodium  1 tablet Oral Daily PRN Bonita Murphy MD      traZODone  100 mg Oral HS Love Stuart MD      traZODone  50 mg Oral HS PRN Bonita Murphy MD          Risks/Benefits of Treatment:     Risks, benefits, and possible side effects of medications explained to patient and patient verbalizes understanding and agreement for treatment.    Progress Toward Goals: improving    Treatment Planning:     All current active medications have been reviewed.  Continue to monitor response to treatment and assess for potential side effects of medications.  Encourage group therapy, milieu therapy and occupational therapy  Collaboration with medical service for medical comorbidities as indicated.  Behavioral Health checks for safety monitoring.    Estimated Discharge Day: 3/25/2025        Subjective     Behavior over the last 24 hours: improving.    Tameka reports good progress.  She says that she does have decreased energy and does have some difficulty taking her medications as late as they are as she typically goes to bed around 7 or 730 at home.  She notes sleep is generally good however.  Appetite been good.  Depression and anxiety both are low and she denies any suicidal or homicidal ideation, psychosis, delusions, auditory or visual hallucinations.  No physical complaints today.    Medication side effects: No  ROS: review of systems as noted above in HPI/Subjective report, all other systems are negative    Objective :  Temp:  [97.9 °F (36.6 °C)-98.8 °F (37.1 °C)] 97.9 °F (36.6 °C)  HR:  [85-91] 85  BP: (115-136)/(74) 115/74  Resp:  [16] 16  SpO2:  [99 %-100 %] 100 %  O2 Device: None (Room air)    Temp:  [97.9 °F (36.6 °C)-98.8 °F (37.1 °C)] 97.9 °F (36.6 °C)  HR:  [85-91] 85  BP: (115-136)/(74) 115/74  Resp:  [16] 16  SpO2:  [99 %-100 %] 100 %  O2 Device: None (Room air)    Mental Status Evaluation:    Appearance:  age appropriate   Behavior:  pleasant, cooperative   Speech:  normal rate   Mood:  normal   Affect:  constricted   Thought Process:  concrete   Thought Content:  no overt delusions   Perceptual Disturbances: none   Risk Potential: Suicidal Ideation -  None  Homicidal Ideation -  None  Potential for Aggression - No   Sensorium:     Memory:  recent and remote memory grossly intact   Consciousness:  alert and awake   Attention/Concentration: attention span and concentration appear shorter than expected for age   Insight:  improving   Judgment: improving   Gait/Station: normal gait/station, normal balance   Motor Activity: no abnormal movements       Lab Results: I have reviewed the following results:  Results from the past 24 hours: No results found for this or any previous visit (from the past 24 hours).    Administrative Statements     Counseling / Coordination of Care:    Patient's progress discussed with staff in treatment team meeting.  Medication changes reviewed with staff in treatment team meeting..    Lang Croft III,  03/22/25

## 2025-03-23 PROCEDURE — 99232 SBSQ HOSP IP/OBS MODERATE 35: CPT | Performed by: PSYCHIATRY & NEUROLOGY

## 2025-03-23 RX ADMIN — GABAPENTIN 300 MG: 300 CAPSULE ORAL at 18:45

## 2025-03-23 RX ADMIN — Medication 2000 UNITS: at 08:22

## 2025-03-23 RX ADMIN — CYANOCOBALAMIN TAB 1000 MCG 1000 MCG: 1000 TAB at 08:22

## 2025-03-23 RX ADMIN — GABAPENTIN 300 MG: 300 CAPSULE ORAL at 14:32

## 2025-03-23 RX ADMIN — Medication 3 MG: at 19:21

## 2025-03-23 RX ADMIN — RISPERIDONE 2 MG: 2 TABLET, FILM COATED ORAL at 14:32

## 2025-03-23 RX ADMIN — NORGESTIMATE AND ETHINYL ESTRADIOL 1 TABLET: KIT at 08:23

## 2025-03-23 RX ADMIN — CITALOPRAM HYDROBROMIDE 10 MG: 10 TABLET ORAL at 08:22

## 2025-03-23 RX ADMIN — RISPERIDONE 2 MG: 2 TABLET, FILM COATED ORAL at 19:21

## 2025-03-23 RX ADMIN — DIVALPROEX SODIUM 1500 MG: 500 TABLET, EXTENDED RELEASE ORAL at 19:21

## 2025-03-23 RX ADMIN — SENNOSIDES 8.6 MG: 8.6 TABLET, FILM COATED ORAL at 17:26

## 2025-03-23 RX ADMIN — GABAPENTIN 300 MG: 300 CAPSULE ORAL at 08:22

## 2025-03-23 RX ADMIN — LORATADINE 10 MG: 10 TABLET ORAL at 08:23

## 2025-03-23 RX ADMIN — LEVOTHYROXINE SODIUM 25 MCG: 0.03 TABLET ORAL at 08:23

## 2025-03-23 RX ADMIN — SENNOSIDES 8.6 MG: 8.6 TABLET, FILM COATED ORAL at 08:23

## 2025-03-23 RX ADMIN — RISPERIDONE 2 MG: 2 TABLET, FILM COATED ORAL at 08:23

## 2025-03-23 RX ADMIN — TRAZODONE HYDROCHLORIDE 100 MG: 100 TABLET ORAL at 19:21

## 2025-03-23 NOTE — NURSING NOTE
Pt requesting virtual visit with group home Monday 3/24/2025 from 330-4pm (email: [email or web address])

## 2025-03-23 NOTE — NURSING NOTE
"Patient is in the community and social with peers. Patient reports \"I have a great day. \" Patient denies all psych symptoms. Patient is compliant with scheduled medications. Q15 observation maintained.   "

## 2025-03-23 NOTE — NURSING NOTE
"Pleasant during conversation. Observed for meals in the milieu and coloring in the dayroom most of the morning. Expresses interest in setting up a video call for Monday with their group home. Describes their mood as \"pretty good\". Denies depression, anxiety, SI, HI, and hallucinations of any kind.    Medication compliant.    Reminded patient to complete ADLs. Encouraged to keep attending groups.    Staff availability reinforced.  "

## 2025-03-23 NOTE — ASSESSMENT & PLAN NOTE
As of 03/23/25: Tameka is improving, eager about discharge. Depakote levels improved, 87. Thus far doing well with change of HS meds to 7pm (3/22/2025)     Current medications:  Continue Celexa 10 mg daily to control anxiety and depressive thoughts. Celexa dose had been decreased 3/21/25- monitor if irritability continues to improve  Continue Depakote ER 1500 mg at bedtime to help with mood stabilization   Recheck Depakote level, CBC/diff and CMP on 3/22/25  Continue Melatonin 3 mg at bedtime to help with sleep - tiredness is resolved  Continue Trazodone 100 mg at bedtime also to help with sleep   Continue Risperdal 2 mg TID to help with mood.   Continue Neurontin 300 mg tid to help with impulse control    No associated orders from this encounter found during lookback period of 72 hours.

## 2025-03-24 PROBLEM — Z00.8 MEDICAL CLEARANCE FOR PSYCHIATRIC ADMISSION: Status: RESOLVED | Noted: 2025-03-11 | Resolved: 2025-03-24

## 2025-03-24 PROCEDURE — 99232 SBSQ HOSP IP/OBS MODERATE 35: CPT | Performed by: PSYCHIATRY & NEUROLOGY

## 2025-03-24 PROCEDURE — NC001 PR NO CHARGE: Performed by: PSYCHIATRY & NEUROLOGY

## 2025-03-24 RX ORDER — LEVOTHYROXINE SODIUM 25 UG/1
25 TABLET ORAL DAILY
Qty: 30 TABLET | Refills: 0 | Status: SHIPPED | OUTPATIENT
Start: 2025-03-24 | End: 2025-04-23

## 2025-03-24 RX ORDER — RISPERIDONE 2 MG/1
2 TABLET ORAL 3 TIMES DAILY
Qty: 90 TABLET | Refills: 0 | Status: SHIPPED | OUTPATIENT
Start: 2025-03-24 | End: 2025-04-23

## 2025-03-24 RX ORDER — LORATADINE 10 MG/1
10 TABLET ORAL DAILY
Qty: 30 TABLET | Refills: 0 | Status: SHIPPED | OUTPATIENT
Start: 2025-03-25 | End: 2025-04-24

## 2025-03-24 RX ORDER — CITALOPRAM HYDROBROMIDE 10 MG/1
10 TABLET ORAL DAILY
Qty: 30 TABLET | Refills: 0 | Status: SHIPPED | OUTPATIENT
Start: 2025-03-25 | End: 2025-04-24

## 2025-03-24 RX ORDER — POLYETHYLENE GLYCOL 3350 17 G/17G
17 POWDER, FOR SOLUTION ORAL DAILY PRN
Qty: 30 EACH | Refills: 0 | Status: SHIPPED | OUTPATIENT
Start: 2025-03-24 | End: 2025-04-23

## 2025-03-24 RX ORDER — BENZTROPINE MESYLATE 1 MG/1
1 TABLET ORAL 2 TIMES DAILY PRN
Qty: 60 TABLET | Refills: 0 | Status: SHIPPED | OUTPATIENT
Start: 2025-03-24 | End: 2025-04-23

## 2025-03-24 RX ORDER — GABAPENTIN 300 MG/1
300 CAPSULE ORAL 3 TIMES DAILY
Qty: 90 CAPSULE | Refills: 0 | Status: SHIPPED | OUTPATIENT
Start: 2025-03-24 | End: 2025-04-23

## 2025-03-24 RX ORDER — SENNOSIDES 8.6 MG
8.6 TABLET ORAL 2 TIMES DAILY
Qty: 60 TABLET | Refills: 0 | Status: SHIPPED | OUTPATIENT
Start: 2025-03-25 | End: 2025-04-24

## 2025-03-24 RX ORDER — TRAZODONE HYDROCHLORIDE 100 MG/1
100 TABLET ORAL
Qty: 30 TABLET | Refills: 0 | Status: SHIPPED | OUTPATIENT
Start: 2025-03-24 | End: 2025-04-23

## 2025-03-24 RX ORDER — DIVALPROEX SODIUM 500 MG/1
1500 TABLET, FILM COATED, EXTENDED RELEASE ORAL
Qty: 90 TABLET | Refills: 0 | Status: SHIPPED | OUTPATIENT
Start: 2025-03-24 | End: 2025-04-23

## 2025-03-24 RX ORDER — NORGESTIMATE AND ETHINYL ESTRADIOL 7DAYSX3 LO
1 KIT ORAL DAILY
Start: 2025-03-25

## 2025-03-24 RX ORDER — FAMOTIDINE 20 MG/1
20 TABLET, FILM COATED ORAL 2 TIMES DAILY PRN
Qty: 60 TABLET | Refills: 0 | Status: SHIPPED | OUTPATIENT
Start: 2025-03-24 | End: 2025-04-23

## 2025-03-24 RX ADMIN — RISPERIDONE 2 MG: 2 TABLET, FILM COATED ORAL at 19:48

## 2025-03-24 RX ADMIN — NORGESTIMATE AND ETHINYL ESTRADIOL 1 TABLET: KIT at 09:41

## 2025-03-24 RX ADMIN — GABAPENTIN 300 MG: 300 CAPSULE ORAL at 09:08

## 2025-03-24 RX ADMIN — Medication 2000 UNITS: at 09:08

## 2025-03-24 RX ADMIN — SENNOSIDES 8.6 MG: 8.6 TABLET, FILM COATED ORAL at 17:12

## 2025-03-24 RX ADMIN — CYANOCOBALAMIN TAB 1000 MCG 1000 MCG: 1000 TAB at 09:08

## 2025-03-24 RX ADMIN — TRAZODONE HYDROCHLORIDE 100 MG: 100 TABLET ORAL at 19:48

## 2025-03-24 RX ADMIN — RISPERIDONE 2 MG: 2 TABLET, FILM COATED ORAL at 14:06

## 2025-03-24 RX ADMIN — GABAPENTIN 300 MG: 300 CAPSULE ORAL at 19:48

## 2025-03-24 RX ADMIN — SENNOSIDES 8.6 MG: 8.6 TABLET, FILM COATED ORAL at 09:08

## 2025-03-24 RX ADMIN — RISPERIDONE 2 MG: 2 TABLET, FILM COATED ORAL at 09:09

## 2025-03-24 RX ADMIN — Medication 3 MG: at 19:48

## 2025-03-24 RX ADMIN — LEVOTHYROXINE SODIUM 25 MCG: 0.03 TABLET ORAL at 09:08

## 2025-03-24 RX ADMIN — GABAPENTIN 300 MG: 300 CAPSULE ORAL at 14:06

## 2025-03-24 RX ADMIN — CITALOPRAM HYDROBROMIDE 10 MG: 10 TABLET ORAL at 09:08

## 2025-03-24 RX ADMIN — DIVALPROEX SODIUM 1500 MG: 500 TABLET, EXTENDED RELEASE ORAL at 19:48

## 2025-03-24 RX ADMIN — LORATADINE 10 MG: 10 TABLET ORAL at 09:08

## 2025-03-24 NOTE — PROGRESS NOTES
Behavioral Health   Name: Tameka Alvarado 30 y.o. female I MRN: [id number]  Unit/Bed#: U 379-01 I Date of Admission: 3/10/2025   Date of Service: 3/24/2025 I Hospital Day: 14    Current medications:  Current Facility-Administered Medications   Medication Dose Route Frequency Provider Last Rate    aluminum-magnesium hydroxide-simethicone  30 mL Oral Q4H PRN Bonita Murphy MD      haloperidol lactate  2.5 mg Intramuscular Q4H PRN Max 4/day Bonita Murphy MD      And    LORazepam  1 mg Intramuscular Q4H PRN Max 4/day Bonita Murphy MD      And    benztropine  0.5 mg Intramuscular Q4H PRN Max 4/day Bonita Murphy MD      haloperidol lactate  5 mg Intramuscular Q4H PRN Max 4/day Bonita Murphy MD      And    LORazepam  2 mg Intramuscular Q4H PRN Max 4/day Bonita Murphy MD      And    benztropine  1 mg Intramuscular Q4H PRN Max 4/day Bonita Murphy MD      benztropine  1 mg Intramuscular Q4H PRN Max 6/day Bonita Murphy MD      benztropine  1 mg Oral Q4H PRN Max 6/day Bonita Murphy MD      bisacodyl  10 mg Rectal Daily PRN Bonita Murphy MD      Cholecalciferol  2,000 Units Oral Daily CHRISTIANNE Hernandez      citalopram  10 mg Oral Daily Love Stuart MD      cyanocobalamin  1,000 mcg Oral Daily CHRISTIANNE Hernandez      hydrOXYzine HCL  50 mg Oral Q6H PRN Max 4/day Bonita Murphy MD      Or    diphenhydrAMINE  50 mg Intramuscular Q6H PRN Bonita Murphy MD      divalproex sodium  1,500 mg Oral HS Lang Croft III, DO      famotidine  20 mg Oral BID PRN Love Stuart MD      gabapentin  300 mg Oral TID Lang Croft III, DO      haloperidol  1 mg Oral Q6H PRN Bonita Murphy MD      haloperidol  2.5 mg Oral Q4H PRN Max 4/day Bonita Murphy MD      haloperidol  5 mg Oral Q4H PRN Max 4/day Bonita Murphy MD      hydrOXYzine HCL  100 mg Oral Q6H PRN Max 4/day Bonita Murphy MD      Or    LORazepam  2 mg Intramuscular Q6H PRN Bonita Murphy MD       hydrOXYzine HCL  25 mg Oral Q6H PRN Max 4/day Bonita Murphy MD      ibuprofen  400 mg Oral Q4H PRN Bonita Murphy MD      ibuprofen  600 mg Oral Q6H PRN Bonita Murphy MD      ibuprofen  800 mg Oral Q8H PRN Bonita Murphy MD      levothyroxine  25 mcg Oral Daily Bonita Murphy MD      loratadine  10 mg Oral Daily Love Stuart MD      melatonin  3 mg Oral HS Lang Croft III, DO      Norgestimate-Eth Estradiol  1 tablet Oral Daily Love Stuart MD      polyethylene glycol  17 g Oral Daily PRN Bonita Murphy MD      propranolol  10 mg Oral Q8H PRN Bonita Murphy MD      risperiDONE  2 mg Oral TID Lang Croft III, DO      senna  1 tablet Oral BID Love Stuart MD      senna-docusate sodium  1 tablet Oral Daily PRN Bonita Murphy MD      traZODone  100 mg Oral HS Lang Croft III, DO      traZODone  50 mg Oral HS PRN Bonita Murphy MD          Risks/Benefits of Treatment:     Risks, benefits, and possible side effects of medications explained to patient and patient verbalizes understanding and agreement for treatment.    Progress Toward Goals: improved. Her mood seems to be more elevated today compared to prior interactions. She is sleeping well, tolerating her diet and medications, interacting with others without issue, and not endorsing any SI/HI/AVH.       Treatment Planning:     Plan is to continue Celexa 10 mg daily to control anxiety and depressive thoughts,  continue Depakote ER 1500 mg at bedtime to help with mood stabilization, continue Melatonin 3 mg at bedtime to help with sleep, continue Trazodone 100 mg at bedtime also to help with sleep, continue Risperdal 2 mg TID to help with mood, and continue Neurontin 300 mg tid to help with impulse control.     Anticipated d/c is tomorrow (3/25/25) to independent living facility.      All current active medications have been reviewed.  Continue to monitor response to treatment and assess for potential side effects of  "medications.  Encourage group therapy, milieu therapy and occupational therapy  Collaboration with medical service for medical comorbidities as indicated.  Behavioral Health checks for safety monitoring.  Long Stay Certification : Not Applicable  Estimated Discharge Day: 3/25/2025   Legal Status : Voluntary 201 commitment.    Subjective     Behavior over the last 24 hours: She has no acute concerns to report. She feels \"really good\" today.She mostly sticks to a routine and she shares that keeps her feeling normal. She denies any side effects to her medications, denies, drowsiness, nausea, vomiting, diarrhea, SOB, chests pain, and her last BM was 2 days ago. She is ambulating without issues, tolerating her diet, and interacting with other patients.      Sleep: normal  Appetite: normal  Medication side effects: No  ROS: review of systems as noted above in HPI/Subjective report, denies any headache, shortness of breath, chest pain, abdominal pain, constipation, cough, dizziness, restlessness, sedation, or sore throat, all other systems are negative    Objective :  Temp:  [98 °F (36.7 °C)-99.4 °F (37.4 °C)] 98 °F (36.7 °C)  HR:  [85-87] 87  BP: (123)/(63-65) 123/63  Resp:  [16] 16  SpO2:  [100 %] 100 %  O2 Device: None (Room air)    Temp:  [98 °F (36.7 °C)-99.4 °F (37.4 °C)] 98 °F (36.7 °C)  HR:  [85-87] 87  BP: (123)/(63-65) 123/63  Resp:  [16] 16  SpO2:  [100 %] 100 %  O2 Device: None (Room air)    Mental Status Evaluation:    Appearance:  age appropriate, casually dressed   Behavior:  pleasant, cooperative   Speech:  normal rate and volume   Mood:  normal   Affect:  \"Really Good\"   Thought Process:  organized, goal directed, logical, coherent   Thought Content:  no overt delusions   Perceptual Disturbances: none   Risk Potential: Suicidal Ideation -  None  Homicidal Ideation -  None  Potential for Aggression - No   Sensorium:  oriented to person, place, and time/date   Memory:  recent and remote memory grossly intact "   Consciousness:  alert and awake   Attention/Concentration: attention span and concentration are age appropriate   Insight:  intact   Judgment: intact   Gait/Station: normal gait/station, normal balance   Motor Activity: no abnormal movements       Lab Results: I have reviewed the following results:  Results from the past 24 hours: No results found for this or any previous visit (from the past 24 hours).    Administrative Statements     Counseling / Coordination of Care:   Patient's progress discussed with staff in treatment team meeting.  Medication changes reviewed with staff in treatment team meeting..    Ravi Herrera 03/24/25

## 2025-03-24 NOTE — NURSING NOTE
Patient is calm and cooperative. Remains visible and social on unit. Denies SI/HI/AVH. Patient is medication and meal compliant. Q 15 min safety checks maintained.

## 2025-03-24 NOTE — NURSING NOTE
Patient pleasant on approach, visible and social on the unit. Denies psych symptoms currently, compliant with medication administration. Expresses excitement about upcoming discharge. Denies any unmet needs at this time.

## 2025-03-24 NOTE — ASSESSMENT & PLAN NOTE
Management per Principal Problem    Orders from past 72 hours:    gabapentin (NEURONTIN) 300 mg capsule; Take 1 capsule (300 mg total) by mouth 3 (three) times a day

## 2025-03-24 NOTE — ASSESSMENT & PLAN NOTE
Management per PCP    Orders from past 72 hours:    levothyroxine 25 mcg tablet; Take 1 tablet (25 mcg total) by mouth daily

## 2025-03-24 NOTE — ASSESSMENT & PLAN NOTE
Management per PCP    Orders from past 72 hours:    Cholecalciferol (VITAMIN D3) 1,000 units tablet; Take 2 tablets (2,000 Units total) by mouth daily

## 2025-03-24 NOTE — ASSESSMENT & PLAN NOTE
Management per PCP    Orders from past 72 hours:    famotidine (PEPCID) 20 mg tablet; Take 1 tablet (20 mg total) by mouth 2 (two) times a day as needed for heartburn

## 2025-03-24 NOTE — ASSESSMENT & PLAN NOTE
Management per PCP    Orders from past 72 hours:    cyanocobalamin (VITAMIN B-12) 1000 MCG tablet; Take 1 tablet (1,000 mcg total) by mouth daily

## 2025-03-24 NOTE — ASSESSMENT & PLAN NOTE
As of 03/24/25: Tameka is improving, eager about discharge. Depakote levels improved, 87. Thus far doing well with change of HS meds to 7pm (3/22/2025)     Current medications:  Continue Celexa 10 mg daily to control anxiety and depressive thoughts. Celexa dose had been decreased 3/21/25- monitor if irritability continues to improve  Continue Depakote ER 1500 mg at bedtime to help with mood stabilization   Recheck Depakote level, CBC/diff and CMP on 3/22/25  Continue Melatonin 3 mg at bedtime to help with sleep - tiredness is resolved  Continue Trazodone 100 mg at bedtime also to help with sleep   Continue Risperdal 2 mg TID to help with mood.   Continue Neurontin 300 mg tid to help with impulse control    No associated orders from this encounter found during lookback period of 72 hours.

## 2025-03-24 NOTE — BH TRANSITION RECORD
Contact Information: If you have any questions, concerns, pended studies, tests and/or procedures, or emergencies regarding your inpatient behavioral health visit. Please contact [facility] [phone] and ask to speak to a , nurse or physician. A contact is available 24 hours/ 7 days a week at this number.     Summary of Procedures Performed During your Stay:  Below is a list of major procedures performed during your hospital stay and a summary of results:  - Cardiac Procedures/Studies: ECG 3/10/25 - Normal sinus rhythm with sinus arrhythmia; Normal ECG; When compared with ECG of 13-Jun-2019 09:56, No significant change was found.    Pending Studies (From admission, onward)       Start     Ordered    03/24/25 0000  CBC and differential         03/24/25 1736    03/24/25 0000  Comprehensive metabolic panel        Comments: Non-fasting      03/24/25 1736    03/24/25 0000  Valproic acid level, total        Comments: Please obtain in the afternoon or evening before evening Depakote ER dose      03/24/25 1736                  Please follow up on the above pending studies with your PCP and/or referring provider.

## 2025-03-24 NOTE — ASSESSMENT & PLAN NOTE
As of 03/25/25: Tameka is doing well. She is cooperative with milieu and her mood seems more stable     Discharge medications:  Celexa 10 mg daily to control anxiety and depressive thoughts.   Depakote ER 1500 mg at bedtime to help with mood stabilization   Melatonin 3 mg at bedtime to help with sleep  Trazodone 100 mg at bedtime also to help with sleep   Risperdal 2 mg TID to help with mood.   Neurontin 300 mg tid to help with impulse control    Orders from past 72 hours:    citalopram (CeleXA) 10 mg tablet; Take 1 tablet (10 mg total) by mouth daily    divalproex sodium (DEPAKOTE ER) 500 mg 24 hr tablet; Take 3 tablets (1,500 mg total) by mouth daily at bedtime    risperiDONE (RisperDAL) 2 mg tablet; Take 1 tablet (2 mg total) by mouth 3 (three) times a day    gabapentin (NEURONTIN) 300 mg capsule; Take 1 capsule (300 mg total) by mouth 3 (three) times a day    CBC and differential; Future    Comprehensive metabolic panel; Future    Valproic acid level, total; Future

## 2025-03-24 NOTE — ASSESSMENT & PLAN NOTE
As of 03/24/25: Tameka is doing well. She is cooperative with milieu and her mood seems more stable     Current medications:  Continue Celexa 10 mg daily to control anxiety and depressive thoughts.   Continue Depakote ER 1500 mg at bedtime to help with mood stabilization   Continue Melatonin 3 mg at bedtime to help with sleep  Continue Trazodone 100 mg at bedtime also to help with sleep   Continue Risperdal 2 mg TID to help with mood.   Continue Neurontin 300 mg tid to help with impulse control    No associated orders from this encounter found during lookback period of 72 hours.
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
## 2025-03-24 NOTE — PROGRESS NOTES
03/24/25 0843   Team Meeting   Meeting Type Daily Rounds   Team Members Present   Team Members Present Physician;Nurse;   Physician Team Member Narciso   Nursing Team Member Yang   Care Management Team Member Hema   Patient/Family Present   Patient Present No   Patient's Family Present No     Pt had some irritability over the weekend. Denying all psych symptoms. Possible d/c for tomorrow.

## 2025-03-24 NOTE — SOCIAL WORK
AQUILES reached out to Tiffani at pt's group home to discuss discharge plan for tomorrow. Tiffani stated that they will arrange for a staff member to pick pt up at discharge from hospital around 12pm. Medications to be sent to [facility]. [address] [facility], [address], [place] [address]

## 2025-03-24 NOTE — PROGRESS NOTES
Progress Note - Behavioral Health   Name: Tameka Alvarado 30 y.o. female I MRN: [id number]  Unit/Bed#: -01 I Date of Admission: 3/10/2025   Date of Service: 3/24/2025 I Hospital Day: 14    Assessment & Plan  Bipolar I disorder, most recent episode depressed, severe without psychotic features (HCC)  As of 03/24/25: Tameka is doing well. She is cooperative with milieu and her mood seems more stable     Current medications:  Continue Celexa 10 mg daily to control anxiety and depressive thoughts.   Continue Depakote ER 1500 mg at bedtime to help with mood stabilization   Continue Melatonin 3 mg at bedtime to help with sleep  Continue Trazodone 100 mg at bedtime also to help with sleep   Continue Risperdal 2 mg TID to help with mood.   Continue Neurontin 300 mg tid to help with impulse control    No associated orders from this encounter found during lookback period of 72 hours.  RADHA (generalized anxiety disorder)  Management per Principal Problem    No associated orders from this encounter found during lookback period of 72 hours.  Obsessive-compulsive disorder, unspecified  Management per Principal Problem    No associated orders from this encounter found during lookback period of 72 hours.  Autism spectrum disorder  Management per Principal Problem    No associated orders from this encounter found during lookback period of 72 hours.  Gastroesophageal reflux disease without esophagitis  Management per medical service    No associated orders from this encounter found during lookback period of 72 hours.  Hypothyroidism  Management per medical service    No associated orders from this encounter found during lookback period of 72 hours.  Medical clearance for psychiatric admission  Management per medical service    No associated orders from this encounter found during lookback period of 72 hours.  Vitamin D deficiency  Management per medical service    No associated orders from this encounter found during lookback period  of 72 hours.  Vitamin B12 deficiency  Management per medical service    No associated orders from this encounter found during lookback period of 72 hours.    Current medications:  Current Facility-Administered Medications   Medication Dose Route Frequency Provider Last Rate    aluminum-magnesium hydroxide-simethicone  30 mL Oral Q4H PRN Bonita Murphy MD      haloperidol lactate  2.5 mg Intramuscular Q4H PRN Max 4/day Bonita Murphy MD      And    LORazepam  1 mg Intramuscular Q4H PRN Max 4/day Bonita Murphy MD      And    benztropine  0.5 mg Intramuscular Q4H PRN Max 4/day Bonita Murphy MD      haloperidol lactate  5 mg Intramuscular Q4H PRN Max 4/day Bonita Murphy MD      And    LORazepam  2 mg Intramuscular Q4H PRN Max 4/day Bonita Murphy MD      And    benztropine  1 mg Intramuscular Q4H PRN Max 4/day Bonita Murphy MD      benztropine  1 mg Intramuscular Q4H PRN Max 6/day Bonita Murphy MD      benztropine  1 mg Oral Q4H PRN Max 6/day Bonita Murphy MD      bisacodyl  10 mg Rectal Daily PRN Bonita Murphy MD      Cholecalciferol  2,000 Units Oral Daily CHRISTIANNE Hernandez      citalopram  10 mg Oral Daily Love Stuart MD      cyanocobalamin  1,000 mcg Oral Daily CHRISTIANNE Hernandez      hydrOXYzine HCL  50 mg Oral Q6H PRN Max 4/day Bonita Murphy MD      Or    diphenhydrAMINE  50 mg Intramuscular Q6H PRN Bonita Murphy MD      divalproex sodium  1,500 mg Oral HS Lang Croft III, DO      famotidine  20 mg Oral BID PRN Love Stuart MD      gabapentin  300 mg Oral TID Lang Croft III, DO      haloperidol  1 mg Oral Q6H PRN Bonita Murphy MD      haloperidol  2.5 mg Oral Q4H PRN Max 4/day Bonita Murphy MD      haloperidol  5 mg Oral Q4H PRN Max 4/day Bonita Murphy MD      hydrOXYzine HCL  100 mg Oral Q6H PRN Max 4/day Bonita Murphy MD      Or    LORazepam  2 mg Intramuscular Q6H PRN Bonita Murphy MD      hydrOXYzine HCL  25  mg Oral Q6H PRN Max 4/day Bonita Murphy MD      ibuprofen  400 mg Oral Q4H PRN Bonita Murphy MD      ibuprofen  600 mg Oral Q6H PRN Bonita Murphy MD      ibuprofen  800 mg Oral Q8H PRN Bonita Murphy MD      levothyroxine  25 mcg Oral Daily Bonita Murphy MD      loratadine  10 mg Oral Daily Love Stuart MD      melatonin  3 mg Oral HS Lang Croft III, DO      Norgestimate-Eth Estradiol  1 tablet Oral Daily Love Stuart MD      polyethylene glycol  17 g Oral Daily PRN Bonita Murphy MD      propranolol  10 mg Oral Q8H PRN Bonita Murphy MD      risperiDONE  2 mg Oral TID Lang Croft III, DO      senna  1 tablet Oral BID Love Stuart MD      senna-docusate sodium  1 tablet Oral Daily PRN Bonita Murphy MD      traZODone  100 mg Oral HS Lang Croft III, DO      traZODone  50 mg Oral HS PRN Bonita Murphy MD          Risks/Benefits of Treatment:     Risks, benefits, and possible side effects of medications explained to patient. Patient has limited understanding of risks and benefits of treatment at this time, but agrees to take medications as prescribed.  Risks of medications in pregnancy explained if female patient. Patient verbalizes understanding and agrees to notify her doctor if she becomes pregnant.    Progress Toward Goals: progressing, mood is stabilizing, working on coping skills, discharge planning, denies current suicidal thoughts, denies homicidal thoughts    Treatment Planning:     All current active medications have been reviewed.  Continue to monitor response to treatment and assess for potential side effects of medications.  Encourage group therapy, milieu therapy and occupational therapy  Collaboration with medical service for medical comorbidities as indicated.  Behavioral Health checks for safety monitoring.  Discharge planned for tomorrow.  Long Stay Certification : Not Applicable  Estimated Discharge Day: 3/25/2025   Legal Status : Voluntary 201  commitment.    Subjective     Behavior over the last 24 hours: improving.    Tameka is doing well today. She is calm, cooperative with milieu, and more pleasant.  She had a good weekend and remains now in behavioral control. She denies current suicidal or homicidal thoughts. Compliant with medications. Attends some groups. Interacts appropriately with peers.    Sleep: normal  Appetite: normal  Medication side effects: No  ROS: review of systems as noted above in HPI/Subjective report, denies any headache, shortness of breath, or chest pain, all other systems are negative    Objective :  Temp:  [98 °F (36.7 °C)-99.4 °F (37.4 °C)] 98 °F (36.7 °C)  HR:  [85-87] 87  BP: (123)/(63-65) 123/63  Resp:  [16] 16  SpO2:  [100 %] 100 %  O2 Device: None (Room air)    Mental Status Evaluation:    Appearance:  age appropriate, casually dressed   Behavior:  cooperative, calm, more plesant   Speech:  normal rate and volume   Mood:  euthymic   Affect:  normal range and intensity, appropriate   Thought Process:  concrete   Thought Content:  no overt delusions   Perceptual Disturbances: no auditory hallucinations, no visual hallucinations   Risk Potential: Suicidal Ideation -  None at present  Homicidal Ideation -  None at present  Potential for Aggression - Not at present   Sensorium:  oriented to person, place, and time/date   Memory:  recent and remote memory grossly intact   Consciousness:  alert and awake   Attention/Concentration: attention span and concentration appear shorter than expected for age   Insight:  limited   Judgment: limited   Gait/Station: normal gait/station, normal balance   Motor Activity: no abnormal movements       Lab Results: I have reviewed the following results:  Last Laboratory Results with Depakote and/or Tegretol levels:   Lab Results   Component Value Date    WBC 7.63 03/22/2025    RBC 4.38 03/22/2025    HGB 13.8 03/22/2025    HCT 42.0 03/22/2025     03/22/2025    RDW 12.2 03/22/2025    NEUTROABS  4.64 03/22/2025    SODIUM 135 03/22/2025    K 4.4 03/22/2025    CL 95 (L) 03/22/2025    CO2 30 03/22/2025    BUN 10 03/22/2025    CREATININE 0.75 03/22/2025    GLUC 107 03/22/2025    CALCIUM 9.9 03/22/2025    AST 19 03/22/2025    ALT 26 03/22/2025    ALKPHOS 73 03/22/2025    TP 7.6 03/22/2025    ALB 4.7 03/22/2025    TBILI 0.23 03/22/2025    VALPROICTOT 87 03/22/2025     Administrative Statements     Counseling / Coordination of Care:   Patient's progress discussed with staff in treatment team meeting.  Medication changes reviewed with staff in treatment team meeting.  Discharge plan discussed with patient..    Love Stuart MD 03/24/25

## 2025-03-25 VITALS
OXYGEN SATURATION: 100 % | RESPIRATION RATE: 16 BRPM | HEIGHT: 64 IN | DIASTOLIC BLOOD PRESSURE: 78 MMHG | WEIGHT: 181.8 LBS | TEMPERATURE: 98.1 F | SYSTOLIC BLOOD PRESSURE: 115 MMHG | HEART RATE: 88 BPM | BODY MASS INDEX: 31.04 KG/M2

## 2025-03-25 PROCEDURE — 99239 HOSP IP/OBS DSCHRG MGMT >30: CPT | Performed by: PSYCHIATRY & NEUROLOGY

## 2025-03-25 PROCEDURE — NC001 PR NO CHARGE: Performed by: PSYCHIATRY & NEUROLOGY

## 2025-03-25 RX ADMIN — GABAPENTIN 300 MG: 300 CAPSULE ORAL at 08:14

## 2025-03-25 RX ADMIN — CYANOCOBALAMIN TAB 1000 MCG 1000 MCG: 1000 TAB at 08:14

## 2025-03-25 RX ADMIN — RISPERIDONE 2 MG: 2 TABLET, FILM COATED ORAL at 08:14

## 2025-03-25 RX ADMIN — LORATADINE 10 MG: 10 TABLET ORAL at 08:14

## 2025-03-25 RX ADMIN — NORGESTIMATE AND ETHINYL ESTRADIOL 1 TABLET: KIT at 08:14

## 2025-03-25 RX ADMIN — SENNOSIDES 8.6 MG: 8.6 TABLET, FILM COATED ORAL at 08:14

## 2025-03-25 RX ADMIN — LEVOTHYROXINE SODIUM 25 MCG: 0.03 TABLET ORAL at 08:14

## 2025-03-25 RX ADMIN — CITALOPRAM HYDROBROMIDE 10 MG: 10 TABLET ORAL at 08:14

## 2025-03-25 RX ADMIN — Medication 2000 UNITS: at 08:14

## 2025-03-25 NOTE — NURSING NOTE
Pt denies SI, HI, AH and VH. Pt has no complaints or concerns. Pt is visible in the milieu and social with peers. Pt is calm, cooperative and pleasant. Medication and meal compliant. Pt looking forward to d/c this afternoon.

## 2025-03-25 NOTE — PLAN OF CARE
Problem: SELF HARM/SUICIDALITY  Goal: Will have no self-injury during hospital stay  Description: INTERVENTIONS:  - Q 15 MINUTES: Routine safety checks  - Q WAKING SHIFT & PRN: Assess risk to determine if routine checks are adequate to maintain patient safety  - Encourage patient to participate actively in care by formulating a plan to combat response to suicidal ideation, identify supports and resources  Outcome: Completed     Problem: DEPRESSION  Goal: Will be euthymic at discharge  Description: INTERVENTIONS:  - Administer medication as ordered  - Provide emotional support via 1:1 interaction with staff  - Encourage involvement in milieu/groups/activities  - Monitor for social isolation  Outcome: Completed     Problem: BEHAVIOR  Goal: Pt/Family maintain appropriate behavior and adhere to behavioral management agreement, if implemented  Description: INTERVENTIONS:  - Assess the family dynamic   - Encourage verbalization of thoughts and concerns in a socially appropriate manner  - Assess patient/family's coping skills and non-compliant behavior (including use of illegal substances).  - Utilize positive, consistent limit setting strategies supporting safety of patient, staff and others  - Initiate consult with Case Management, Spiritual Care or other ancillary services as appropriate  - If a patient's/visitor's behavior jeopardizes the safety of the patient, staff, or others, refer to organization procedure.   - Notify Security of behavior or suspected illegal substances which indicate the need for search of the patient and/or belongings  - Encourage participation in the decision making process about a behavioral management agreement; implement if patient meets criteria  Outcome: Completed     Problem: ANXIETY  Goal: Will report anxiety at manageable levels  Description: INTERVENTIONS:  - Administer medication as ordered  - Teach and encourage coping skills  - Provide emotional support  - Assess patient/family for  anxiety and ability to cope  Outcome: Completed  Goal: By discharge: Patient will verbalize 2 strategies to deal with anxiety  Description: Interventions:  - Identify any obvious source/trigger to anxiety  - Staff will assist patient in applying identified coping technique/skills  - Encourage attendance of scheduled groups and activities  Outcome: Completed     Problem: Risk for Violence/Aggression Toward Others  Goal: Treatment Goal: Refrain from acts of violence/aggression during length of stay, and demonstrate improved impulse control at the time of discharge  Outcome: Completed  Goal: Verbalize thoughts and feelings  Description: Interventions:  - Assess and re-assess patient's level of risk, every waking shift  - Engage patient in 1:1 interactions, daily, for a minimum of 15 minutes   - Allow patient to express feelings and frustrations in a safe and non-threatening manner   - Establish rapport/trust with patient   Outcome: Completed  Goal: Refrain from harming others  Outcome: Completed  Goal: Refrain from destructive acts on the environment or property  Outcome: Completed  Goal: Control angry outbursts  Description: Interventions:  - Monitor patient closely, per order  - Ensure early verbal de-escalation  - Monitor prn medication needs  - Set reasonable/therapeutic limits, outline behavioral expectations, and consequences   - Provide a non-threatening milieu, utilizing the least restrictive interventions   Outcome: Completed  Goal: Attend and participate in unit activities, including therapeutic, recreational, and educational groups  Description: Interventions:  - Provide therapeutic and educational activities daily, encourage attendance and participation, and document same in the medical record   Outcome: Completed  Goal: Identify appropriate positive anger management techniques  Description: Interventions:  - Offer anger management and coping skills groups   - Staff will provide positive feedback for  appropriate anger control  Outcome: Completed     Problem: Ineffective Coping  Goal: Participates in unit activities  Description: Interventions:  - Provide therapeutic environment   - Provide required programming   - Redirect inappropriate behaviors   Outcome: Completed     Problem: DISCHARGE PLANNING - CARE MANAGEMENT  Goal: Discharge to post-acute care or home with appropriate resources  Description: INTERVENTIONS:  - Conduct assessment to determine patient/family and health care team treatment goals, and need for post-acute services based on payer coverage, community resources, and patient preferences, and barriers to discharge  - Address psychosocial, clinical, and financial barriers to discharge as identified in assessment in conjunction with the patient/family and health care team  - Arrange appropriate level of post-acute services according to patient’s   needs and preference and payer coverage in collaboration with the physician and health care team  - Communicate with and update the patient/family, physician, and health care team regarding progress on the discharge plan  - Arrange appropriate transportation to post-acute venues  Outcome: Completed

## 2025-03-25 NOTE — PROGRESS NOTES
03/25/25 0854   Team Meeting   Meeting Type Daily Rounds   Team Members Present   Team Members Present Physician;Nurse;   Physician Team Member Narciso   Nursing Team Member Stacie   Care Management Team Member Hema   Patient/Family Present   Patient Present No   Patient's Family Present No     Pt denying all symptoms. Set for discharge this afternoon at 12. Staff from [facility] will coordinate to pick pt up from hospital at discharge. Meds send to preferred pharmacy used by [facility]

## 2025-03-25 NOTE — PROGRESS NOTES
Tx plan was reviewed and discussed with Pt. Pt was encouraged to attend groups. Medication was discussed with Pt. Pt signed tx plan.

## 2025-03-25 NOTE — PLAN OF CARE
Problem: SELF HARM/SUICIDALITY  Goal: Will have no self-injury during hospital stay  Description: INTERVENTIONS:  - Q 15 MINUTES: Routine safety checks  - Q WAKING SHIFT & PRN: Assess risk to determine if routine checks are adequate to maintain patient safety  - Encourage patient to participate actively in care by formulating a plan to combat response to suicidal ideation, identify supports and resources  Outcome: Progressing     Problem: DEPRESSION  Goal: Will be euthymic at discharge  Description: INTERVENTIONS:  - Administer medication as ordered  - Provide emotional support via 1:1 interaction with staff  - Encourage involvement in milieu/groups/activities  - Monitor for social isolation  Outcome: Progressing     Problem: BEHAVIOR  Goal: Pt/Family maintain appropriate behavior and adhere to behavioral management agreement, if implemented  Description: INTERVENTIONS:  - Assess the family dynamic   - Encourage verbalization of thoughts and concerns in a socially appropriate manner  - Assess patient/family's coping skills and non-compliant behavior (including use of illegal substances).  - Utilize positive, consistent limit setting strategies supporting safety of patient, staff and others  - Initiate consult with Case Management, Spiritual Care or other ancillary services as appropriate  - If a patient's/visitor's behavior jeopardizes the safety of the patient, staff, or others, refer to organization procedure.   - Notify Security of behavior or suspected illegal substances which indicate the need for search of the patient and/or belongings  - Encourage participation in the decision making process about a behavioral management agreement; implement if patient meets criteria  Outcome: Progressing     Problem: ANXIETY  Goal: Will report anxiety at manageable levels  Description: INTERVENTIONS:  - Administer medication as ordered  - Teach and encourage coping skills  - Provide emotional support  - Assess  patient/family for anxiety and ability to cope  Outcome: Progressing  Goal: By discharge: Patient will verbalize 2 strategies to deal with anxiety  Description: Interventions:  - Identify any obvious source/trigger to anxiety  - Staff will assist patient in applying identified coping technique/skills  - Encourage attendance of scheduled groups and activities  Outcome: Progressing     Problem: Ineffective Coping  Goal: Participates in unit activities  Description: Interventions:  - Provide therapeutic environment   - Provide required programming   - Redirect inappropriate behaviors   Outcome: Progressing     Problem: DISCHARGE PLANNING - CARE MANAGEMENT  Goal: Discharge to post-acute care or home with appropriate resources  Description: INTERVENTIONS:  - Conduct assessment to determine patient/family and health care team treatment goals, and need for post-acute services based on payer coverage, community resources, and patient preferences, and barriers to discharge  - Address psychosocial, clinical, and financial barriers to discharge as identified in assessment in conjunction with the patient/family and health care team  - Arrange appropriate level of post-acute services according to patient’s   needs and preference and payer coverage in collaboration with the physician and health care team  - Communicate with and update the patient/family, physician, and health care team regarding progress on the discharge plan  - Arrange appropriate transportation to post-acute venues  Outcome: Progressing

## 2025-03-25 NOTE — NURSING NOTE
AVS printed and reviewed with pt, pt verbalized understanding. AVS also reviewed with pts group home staff. Pt has no complaints or concerns. Pt walking off unit with belongings by her side. Pt left with group home staff to return back to [facility]

## 2025-03-25 NOTE — DISCHARGE INSTR - OTHER ORDERS
Crisis Services  If you or someone you know is in crisis, please call 1-927.393.1311 988 has been designated as the new three-digit dialing code that will route callers to the National Suicide Prevention Lifeline. You can call, text, or chat 667, and be connected to trained counselors that are part of the existing National Suicide Prevention Lifeline network. The current Lifeline phone number (1-209.207.5178) will always remain available to people in emotional distress or suicidal crisis.  National Suicide Prevention Hotline: Call, Text or Chat 360 or  1-411.995.9859   [facility] [place] Crisis:  763.777.3457  [facility] Crisis:  633.396.4920  [facility] Crisis:  [phone]  [place] Drug & Alcohol Helpline:  1-647.453.6666    [facility] Mobile Crisis Intervention Services are available to all residents of [facility] of all ages who are experiencing acute issues with disturbed thought, behavior, mood or social relationships that require immediate intervention.  [facility] Mobile Crisis Intervention Services are provided by experienced Mental Health Professionals with extensive knowledge of behavioral health disorders, treatment and local community resources.  Mobile Crisis Intervention Services include:  assessment  information and referral to behavioral health treatment and support services  crisis counseling, resolution, and follow-up  accessing local, individualized community resources  accessing emergency services and psychiatric or medical consultation    To request Mobile Crisis Intervention Services, call [phone].  A Telephone Crisis Worker will gather preliminary information, help determine if Mobile Services are appropriate, and dispatch a Mobile Crisis Intervention Service team.  Mobile Crisis Hours of Operation:    Monday to Friday- 7:00 a.m.-10:30 p.m.  Saturday and Sunday- 9:00 a.m.-9:30 pm.  Outside of these hours of operation, a mobile crisis worker is on call.  24/7  Crisis Hotline:  1-729.472.7523    [facility] food bolanos and free pantries.  Free food or low cost groceries, meals, and other forms of assistance is available to [facility] residents. Several food bolanos and pantries can assist families, children, individuals, and senior citizens in the [facility]. While aid is focused on people who are faced with an emergency, resources may be provided to individuals of all backgrounds.  Many of the food pantries acquire products through donations, food canned drives held by non-profits, and partnerships with churches, charities and local food bolanos. The pantries then distribute the products to people who are in a crisis or emergency situation. Some locations can also provide items such as clothing, baby formula and diapers to qualified individuals.  The [facility] pantries are effective at helping those who need immediate access to food. Many of the programs were developed to serve as an immediate measure to hold a family or individual over until extended services are secured, such as enrollment into government aid including food stamps.      [facility] Action [address], [address]  ([phone]  The [employer] runs a pantry that distributes food and grocery products to low income residents of [facility] who are in a crisis.  [facility] Food Pantry has multiple locations in the [facility]. They are located at [address] [place], [place], [address] (phone [phone]) as well as [address], [place], [address], dial [phone].        [facility]  [address] [place] [address] [address], [place], [address], [phone]  [address] [facility] [place] [facility], [place], [address], telephone [phone]  [address] [place] [address], [address], call [phone]  Works with an extensive network of non-profit providers within [facility]. The center can provide food boxes, canned goods, and more to low-income individuals and families. Also get information on programs such as WIC or holiday meals.  [facility]  Main address is [address] [place], [place], [address]  Telephone number is [phone]  Offers  and case management. Some emergency financial aid may be offered for needs such as rent or utilities. They will also be involved in Tan and Thanksgiving meal programs in [facility].  [facility]  [address], [place], [address]  Telephone number is [phone]  [facility] on Health and Welfare Emergency Food Cupboard  Address - [address] [place], [place], [address]  Call [phone]  Perishable food, canned goods, formula, and items such as clothing is for families living in poverty. The staff can also refer clients to TAN or housing programs.  [facility] Food Pantry  [address], [place], [address]  Telephone number is ([phone]  [facility] [employer] [facility] [address] [place] [address] [phone]  Groceries, canned goods, and other food items may be given out to the needy.         The Food Lengow offers the Community Nutrition Program  [facility], [address].  ([phone]  This program helps qualified families and individuals who live in the [facility]. It can provide them with healthy meals and foods through the emergency system by increasing the amount of fresh and nutritionally rich perishable foods available in local pantries in [facility].  [facility] Coalition Against Hunger  The Hun[employer]ighters Network (phone ([phone] [phone]) provides referrals and information to hundreds of local pantries, government programs, clothing closets, and holiday meal sites.  They also have information on Smarty Ants programs, such as summer food for children and commodities. [facility] senior citizens can also be referred to Meals on Wheels.  [facility] Works Food Pantry - [facility]   Address - [address] [place], [place], [address]  [phone]  [facility]  [facility] [address] [place], [place], [address]  Primary phone number - ([phone]  Personal hygiene items, a free box of food, clothing, and more is for the low income. There may also be information on financial and and home delivery for the disabled.  [facility]  Address of food pantry - [address] [place], [place], [address]  [facility]  [address] [facility] [address]  [place], [address]  Dial [phone] for hours  The food pantry can distributes groceries low income people in [facility] who have no other options.  [employer] [employer] [facility]  The address of the non-profit is [address], [place], [address]  [phone]  The non-profit serves a wide variety of people from all walks of life who find themselves in need of food or financial assistance.         [facility]  [address] [facility] [address], [place], [place], [address]  ([phone]  Provides monthly food packages to families in the [employer] who meet certain low income eligibility requirements and other guidelines.  [facility] Cupboard Food Pantry  [address]  [place], [place], [address]  [address],   [place], [place], [address]  For information, dial [phone]  Loaves [facility] Fishes Pantry is run at [facility]  Main address is [address] [place], [place], [address]  Dial [phone]  The Vood[employer] based ministry has referrals. Clients can learn about local food bolanos, thrift stores in [facility], and resources for diapers or senior  care,  [facility], [facility]  [address] [facility], [place], [place], [address]  Phone number [phone]  [facility] Cupboard  [address] [place], [place], [address]  Dial [phone]  Groceries, soup, coffee, information on SNAP food stamps, and other aid is arranged.  [facility]  Address is [address] [facility] [address]  [place], [place], [address]  Call [phone]  [facility] Food [place]  [address] [place], [place], [address]  [phone]  [facility]  [address], [place], [address]  Phone: [phone]  A soup kitchen and food pantry are on site. Low income families, the disabled, veterans, and others such as single mothers are clients.  [facility] - [facility]  Address is [address], [address]  Main phone - [phone]      [facility]  Address is [address]  [place], [place], [address]  Telephone - [phone]  The danna based alexandre serves the poor and disadvantaged. Whether it is food, clothing, or information on financial aid, resources are available.  [facility]  [address], [place], [address] [phone]  The free Food Cupboard provides for the physical and spiritual needs of our community by sharing food and a time of prayer with those in need of help. Learn about other  and services for the region.  [facility], [facility]  [address] [place], [place], [address]  [phone]  [facility]  Main location is [address], [address]  Main number [phone]  [facility]  [address] [facility] [place], [place], [address]. Call [phone]  [address] [facility] [address] [address], [place], [address], dial [phone]  Offers food in an immediate basis to hold a  family over until extended services are secured.  [facility]  Address is [address]  [place], [place], [address]  Main phone [phone]  In a crisis, assistance for food and hunger prevention may be arranged.     [facility] Senior Citizens  Dial [phone]  Seniors in [facility] can receive financial help with everyday necessities. Food, meals, and much more, such as information on Medicare, may be offered.  [facility]  [address] [place], [address]  Primary phone [phone]  Services include Adult Community Recreation and Education Programs, Emergency Financial Assistance, Emergency Food and Nutrition Programs. Other resources from the agency include Seasonal Services such as holiday meals, Youth Services-[place] Programs, Tan Meals and Gifts.  Haile Estrada  [facility] [address] [place], [address]  Dial [phone]  [facility]  Food bank address is [address] [facility]  [place], [address]  Dial [phone]  Services provided include free hot meals for the homeless and very low income. Spiritual guidance and other support is also provided to [facility] families in addition to the food boxes and perishable items.  [facility] [employer], [facility]  [address] [facility]  [place], [address] [phone]  [facility]  Food pantry address - [address], [place], [address]  Call [phone]  Feeding children over the summer and weekends is always a prioirty.  To find additional distribution centers, call [phone]. A number of other pantries, feeding sites, churches, and charities provide emergency food boxes, hot meals, and surplus government commodities. Seniors in [facility] can also look into the Commodity Supplemental Food Program, or CSFP.

## 2025-03-25 NOTE — PROGRESS NOTES
Behavioral Health   Name: Tameka Alvarado 30 y.o. female I MRN: [id number]  Unit/Bed#: U 379-01 I Date of Admission: 3/10/2025   Date of Service: 3/25/2025 I Hospital Day: 15    Current medications:  Current Facility-Administered Medications   Medication Dose Route Frequency Provider Last Rate    aluminum-magnesium hydroxide-simethicone  30 mL Oral Q4H PRN Bonita Murphy MD      haloperidol lactate  2.5 mg Intramuscular Q4H PRN Max 4/day Bonita Murphy MD      And    LORazepam  1 mg Intramuscular Q4H PRN Max 4/day Bonita Murphy MD      And    benztropine  0.5 mg Intramuscular Q4H PRN Max 4/day Bonita Murphy MD      haloperidol lactate  5 mg Intramuscular Q4H PRN Max 4/day Bonita Murphy MD      And    LORazepam  2 mg Intramuscular Q4H PRN Max 4/day Bonita Murphy MD      And    benztropine  1 mg Intramuscular Q4H PRN Max 4/day Bonita Murphy MD      benztropine  1 mg Intramuscular Q4H PRN Max 6/day Bonita Murphy MD      benztropine  1 mg Oral Q4H PRN Max 6/day Bonita Murphy MD      bisacodyl  10 mg Rectal Daily PRN Bonita Murphy MD      Cholecalciferol  2,000 Units Oral Daily CHRISTIANNE Hernandez      citalopram  10 mg Oral Daily Love Stuart MD      cyanocobalamin  1,000 mcg Oral Daily CHRISTIANNE Hernandez      hydrOXYzine HCL  50 mg Oral Q6H PRN Max 4/day Bonita Murphy MD      Or    diphenhydrAMINE  50 mg Intramuscular Q6H PRN Bonita Murphy MD      divalproex sodium  1,500 mg Oral HS Lang Croft III, DO      famotidine  20 mg Oral BID PRN Love Stuart MD      gabapentin  300 mg Oral TID Lang Croft III, DO      haloperidol  1 mg Oral Q6H PRN Bonita Murphy MD      haloperidol  2.5 mg Oral Q4H PRN Max 4/day Bonita Murphy MD      haloperidol  5 mg Oral Q4H PRN Max 4/day Bonita Murphy MD      hydrOXYzine HCL  100 mg Oral Q6H PRN Max 4/day Bonita Murphy MD      Or    LORazepam  2 mg Intramuscular Q6H PRN Bonita Murphy MD       "hydrOXYzine HCL  25 mg Oral Q6H PRN Max 4/day Bonita Murphy MD      ibuprofen  400 mg Oral Q4H PRN Bonita Murphy MD      ibuprofen  600 mg Oral Q6H PRN Bonita Murphy MD      ibuprofen  800 mg Oral Q8H PRN Bonita Murphy MD      levothyroxine  25 mcg Oral Daily Bonita Murphy MD      loratadine  10 mg Oral Daily Love Stuart MD      melatonin  3 mg Oral HS Lang Croft III, DO      Norgestimate-Eth Estradiol  1 tablet Oral Daily Love Stuart MD      polyethylene glycol  17 g Oral Daily PRN Bonita Murphy MD      propranolol  10 mg Oral Q8H PRN Bonita Murphy MD      risperiDONE  2 mg Oral TID Lang Croft III, DO      senna  1 tablet Oral BID Love Stuart MD      senna-docusate sodium  1 tablet Oral Daily PRN Bonita Murphy MD      traZODone  100 mg Oral HS Lang Croft III, DO      traZODone  50 mg Oral HS PRN Bonita Murphy MD          Risks/Benefits of Treatment:     Risks, benefits, and possible side effects of medications explained to patient and patient verbalizes understanding and agreement for treatment.    Progress Toward Goals: improved. Pt denies SI/HI/SVH. Her psychiatric and mood symptoms are resolved. She is in no acute distress, hemodynamically stable at the moment, and ready for discharge.     Treatment Planning:     Plan is to D/C today at noon to independent living facility. Medications to be [facility] to [facility].     All current active medications have been reviewed.  Continue to monitor response to treatment and assess for potential side effects of medications.  Encourage group therapy, milieu therapy and occupational therapy  Collaboration with medical service for medical comorbidities as indicated.  Behavioral Health checks for safety monitoring.    Estimated Discharge Day: 3/25/2025   Legal Status : Voluntary 201 commitment.    Subjective     Behavior over the last 24 hours: improved. Pt reports feeling \"really good\". She denies SI/HI/SVH. " "She reports feeling ready to be discharged.       Sleep: normal  Appetite: normal  Medication side effects: No  ROS: review of systems as noted above in HPI/Subjective report, denies any headache, shortness of breath, chest pain, abdominal pain, abdominal discomfort, arm stiffness, back pain, muscle aches, muscle cramps, muscle twitching, neck pain, or restlessness, all other systems are negative    Objective :  Temp:  [98.1 °F (36.7 °C)-99.3 °F (37.4 °C)] 98.1 °F (36.7 °C)  HR:  [88-95] 88  BP: (115-138)/(61-78) 115/78  Resp:  [16] 16  SpO2:  [98 %-100 %] 100 %  O2 Device: None (Room air)    Temp:  [98.1 °F (36.7 °C)-99.3 °F (37.4 °C)] 98.1 °F (36.7 °C)  HR:  [88-95] 88  BP: (115-138)/(61-78) 115/78  Resp:  [16] 16  SpO2:  [98 %-100 %] 100 %  O2 Device: None (Room air)    Mental Status Evaluation:    Appearance:  age appropriate   Behavior:  pleasant   Speech:  normal rate and volume   Mood:  \"Really good\"   Affect:  normal range and intensity   Thought Process:  organized, goal directed, logical, coherent   Thought Content:  no overt delusions   Perceptual Disturbances: no auditory hallucinations, no visual hallucinations   Risk Potential: Suicidal Ideation -  None  Homicidal Ideation -  None  Potential for Aggression - No   Sensorium:  oriented to person, place, and time/date   Memory:  recent and remote memory grossly intact   Consciousness:  alert and awake   Attention/Concentration: attention span and concentration are age appropriate   Insight:  intact   Judgment: intact   Gait/Station: normal gait/station, normal balance   Motor Activity: no abnormal movements       Lab Results: I have reviewed the following results:  Results from the past 24 hours: No results found for this or any previous visit (from the past 24 hours).    Administrative Statements     Counseling / Coordination of Care:   Patient's progress discussed with staff in treatment team meeting.  Medication changes reviewed with staff in treatment " team meeting..    Ravi Herrera 03/25/25

## 2025-03-25 NOTE — DISCHARGE INSTR - APPOINTMENTS
Behavioral Health Nurse Navigator, Marlene or Nikky will be calling you after your discharge, on the phone number that you provided.  They will be available as an additional support, if needed.   If you wish to speak with Marlene, you may contact her at [phone].

## 2025-03-26 NOTE — SOCIAL WORK
Pt to D/C today. Pt denies SI/HI/AVH. Pt oriented x3. Pt to d/c to [facility] and staff from Group Home will  upon discharge. Pt to follow up with [facility] upon discharge for outpatient follow-up. Scripts sent to preferred pharmacy.     Discharge Address: [address] [place] [address]  Phone:  [phone]

## 2025-04-02 ENCOUNTER — RESULTS FOLLOW-UP (OUTPATIENT)
Dept: PSYCHIATRY | Facility: CLINIC | Age: 30
End: 2025-04-02

## 2025-04-02 LAB
ALBUMIN SERPL-MCNC: 4.5 G/DL (ref 3.5–5.7)
ALP SERPL-CCNC: 50 U/L (ref 35–120)
ALT SERPL-CCNC: 14 U/L
ANION GAP SERPL CALCULATED.3IONS-SCNC: 5 MMOL/L (ref 3–11)
AST SERPL-CCNC: 14 U/L
BASOPHILS # BLD AUTO: 0.1 THOU/CMM (ref 0–0.1)
BASOPHILS NFR BLD AUTO: 1 %
BILIRUB SERPL-MCNC: 0.4 MG/DL (ref 0.2–1)
BUN SERPL-MCNC: 21 MG/DL (ref 7–25)
CALCIUM SERPL-MCNC: 9.4 MG/DL (ref 8.5–10.5)
CHLORIDE SERPL-SCNC: 100 MMOL/L (ref 100–109)
CO2 SERPL-SCNC: 32 MMOL/L (ref 21–31)
CREAT SERPL-MCNC: 0.85 MG/DL (ref 0.4–1.1)
CYTOLOGY CMNT CVX/VAG CYTO-IMP: ABNORMAL
DIFFERENTIAL METHOD BLD: NORMAL
EOSINOPHIL # BLD AUTO: 0 THOU/CMM (ref 0–0.5)
EOSINOPHIL NFR BLD AUTO: 0 %
ERYTHROCYTE [DISTWIDTH] IN BLOOD BY AUTOMATED COUNT: 13.3 % (ref 12–16)
GFR/BSA.PRED SERPLBLD CYS-BASED-ARV: 93 ML/MIN/{1.73_M2}
GLUCOSE SERPL-MCNC: 92 MG/DL (ref 65–99)
HCT VFR BLD AUTO: 39.3 % (ref 35–43)
HGB BLD-MCNC: 13.3 G/DL (ref 11.5–14.5)
LYMPHOCYTES # BLD AUTO: 1.8 THOU/CMM (ref 1–3)
LYMPHOCYTES NFR BLD AUTO: 27 %
MCH RBC QN AUTO: 31.6 PG (ref 26–34)
MCHC RBC AUTO-ENTMCNC: 33.9 G/DL (ref 32–37)
MCV RBC AUTO: 93 FL (ref 80–100)
MONOCYTES # BLD AUTO: 0.5 THOU/CMM (ref 0.3–1)
MONOCYTES NFR BLD AUTO: 8 %
NEUTROPHILS # BLD AUTO: 4.4 THOU/CMM (ref 1.8–7.8)
NEUTROPHILS NFR BLD AUTO: 64 %
PLATELET # BLD AUTO: 223 THOU/CMM (ref 140–350)
PMV BLD REES-ECKER: 7.6 FL (ref 7.5–11.3)
POTASSIUM SERPL-SCNC: 4.6 MMOL/L (ref 3.5–5.2)
PROT SERPL-MCNC: 6.8 G/DL (ref 6.3–8.3)
RBC # BLD AUTO: 4.22 MILL/CMM (ref 3.7–4.7)
SODIUM SERPL-SCNC: 137 MMOL/L (ref 135–145)
VALPROATE SERPL-MCNC: 90 UG/ML (ref 50–100)
WBC # BLD AUTO: 6.9 THOU/CMM (ref 4–10)

## 2025-04-02 NOTE — RESULT ENCOUNTER NOTE
Inpatient only - follows with [facility]. VPA level is therapeutic at 90. CBC/diff and CMP are normal

## 2025-05-24 ENCOUNTER — HOSPITAL ENCOUNTER (EMERGENCY)
Facility: HOSPITAL | Age: 30
End: 2025-05-25
Attending: EMERGENCY MEDICINE | Admitting: EMERGENCY MEDICINE
Payer: COMMERCIAL

## 2025-05-24 DIAGNOSIS — R45.851 SUICIDAL IDEATIONS: Primary | ICD-10-CM

## 2025-05-24 DIAGNOSIS — Z00.8 ENCOUNTER FOR PSYCHOLOGICAL EVALUATION: ICD-10-CM

## 2025-05-24 PROCEDURE — 82075 ASSAY OF BREATH ETHANOL: CPT

## 2025-05-24 PROCEDURE — 99285 EMERGENCY DEPT VISIT HI MDM: CPT

## 2025-05-24 PROCEDURE — 81025 URINE PREGNANCY TEST: CPT

## 2025-05-24 PROCEDURE — 80307 DRUG TEST PRSMV CHEM ANLYZR: CPT

## 2025-05-24 RX ORDER — RISPERIDONE 2 MG/1
2 TABLET ORAL 3 TIMES DAILY
Status: DISCONTINUED | OUTPATIENT
Start: 2025-05-24 | End: 2025-05-25 | Stop reason: HOSPADM

## 2025-05-24 RX ORDER — LEVOTHYROXINE SODIUM 25 UG/1
25 TABLET ORAL
Status: DISCONTINUED | OUTPATIENT
Start: 2025-05-25 | End: 2025-05-25 | Stop reason: HOSPADM

## 2025-05-24 RX ORDER — TRAZODONE HYDROCHLORIDE 50 MG/1
100 TABLET ORAL
Status: DISCONTINUED | OUTPATIENT
Start: 2025-05-24 | End: 2025-05-25 | Stop reason: HOSPADM

## 2025-05-24 RX ORDER — CITALOPRAM HYDROBROMIDE 10 MG/1
30 TABLET ORAL DAILY
Status: DISCONTINUED | OUTPATIENT
Start: 2025-05-25 | End: 2025-05-25 | Stop reason: HOSPADM

## 2025-05-24 RX ORDER — TEMAZEPAM 15 MG/1
15 CAPSULE ORAL
Status: DISCONTINUED | OUTPATIENT
Start: 2025-05-24 | End: 2025-05-25 | Stop reason: HOSPADM

## 2025-05-24 RX ORDER — LORATADINE 10 MG/1
10 TABLET ORAL DAILY
Status: DISCONTINUED | OUTPATIENT
Start: 2025-05-25 | End: 2025-05-25 | Stop reason: HOSPADM

## 2025-05-24 RX ORDER — NORGESTIMATE AND ETHINYL ESTRADIOL 7DAYSX3 LO
1 KIT ORAL DAILY
Status: DISCONTINUED | OUTPATIENT
Start: 2025-05-25 | End: 2025-05-25 | Stop reason: HOSPADM

## 2025-05-24 RX ORDER — TEMAZEPAM 15 MG/1
15 CAPSULE ORAL
COMMUNITY

## 2025-05-24 RX ORDER — DIVALPROEX SODIUM 500 MG/1
1500 TABLET, FILM COATED, EXTENDED RELEASE ORAL
Status: DISCONTINUED | OUTPATIENT
Start: 2025-05-24 | End: 2025-05-25 | Stop reason: HOSPADM

## 2025-05-24 RX ORDER — ACETAMINOPHEN 325 MG/1
975 TABLET ORAL ONCE
Status: COMPLETED | OUTPATIENT
Start: 2025-05-25 | End: 2025-05-24

## 2025-05-24 RX ORDER — GABAPENTIN 300 MG/1
300 CAPSULE ORAL 3 TIMES DAILY
Status: DISCONTINUED | OUTPATIENT
Start: 2025-05-24 | End: 2025-05-25 | Stop reason: HOSPADM

## 2025-05-24 RX ORDER — SENNOSIDES 8.6 MG
8.6 TABLET ORAL 2 TIMES DAILY
Status: DISCONTINUED | OUTPATIENT
Start: 2025-05-25 | End: 2025-05-25 | Stop reason: HOSPADM

## 2025-05-24 RX ORDER — FAMOTIDINE 20 MG/1
20 TABLET, FILM COATED ORAL 2 TIMES DAILY PRN
Status: DISCONTINUED | OUTPATIENT
Start: 2025-05-24 | End: 2025-05-25 | Stop reason: HOSPADM

## 2025-05-24 RX ADMIN — DIVALPROEX SODIUM 1500 MG: 500 TABLET, EXTENDED RELEASE ORAL at 23:00

## 2025-05-24 RX ADMIN — ACETAMINOPHEN 975 MG: 325 TABLET, FILM COATED ORAL at 23:54

## 2025-05-24 RX ADMIN — Medication 3 MG: at 23:00

## 2025-05-24 RX ADMIN — TRAZODONE HYDROCHLORIDE 100 MG: 50 TABLET ORAL at 23:00

## 2025-05-24 NOTE — ED PROVIDER NOTES
"Time reflects when diagnosis was documented in both MDM as applicable and the Disposition within this note       Time User Action Codes Description Comment    5/24/2025  8:49 PM ZaidiPatriceRiaz Add [R45.851] Suicidal ideations     5/24/2025  9:10 PM Riaz Zaidi Add [Z00.8] Encounter for psychological evaluation           ED Disposition       ED Disposition   Transfer to Behavioral Health Condition   --    Date/Time   Sat May 24, 2025  9:10 PM    Comment   Tameka Alvarado should be transferred out to  and has been medically cleared.               Assessment & Plan       Medical Decision Making  30-year-old female presents to ED for evaluation of suicidal ideation as seen in HPI.  On physical examination patient vital signs stable.  Normotensive.  Afebrile.  Nontachycardic.  Nonhypoxic.  No respiratory distress.  Nontoxic-appearing.  Expressing suicidal ideation.  No homicidal ideation.  Will obtain UDS, alcohol prep test, urine pregnancy test.  Crisis worker consult placed.     Patient met with crisis worker.  Signed a 201.  Awaiting placement.     Patient remains stable.  Signed out to Dr. Vizcarra.  Pending placement.  Med recs signed.    Amount and/or Complexity of Data Reviewed  Labs: ordered.    Risk  OTC drugs.  Prescription drug management.  Decision regarding hospitalization.             Medications - No data to display    ED Risk Strat Scores                    No data recorded                            History of Present Illness       Chief Complaint   Patient presents with    Psychiatric Evaluation     Pt to ED from [facility]. Requested to come to ED because \"I dont like when they check on me every 15 mins\"  angry and SI thoughts since Wednesday.       Past Medical History[1]   Past Surgical History[2]   Family History[3]   Social History[4]   E-Cigarette/Vaping    E-Cigarette Use Never User       E-Cigarette/Vaping Substances      I have reviewed and agree with the history as documented. "     30-year-old female history of RADHA, autism spectrum disorder, TBI, bipolar 1 disorder presents to ED for evaluation of suicidal ideation.  Patient states that since Monday she has been having persistent suicidal ideation.  Does not have a specific plan but at times does admit to considering stepping out into traffic to be hit by vehicles.  Otherwise no specific plan.  Since her suicidal ideation started, patient has been on every 15 minute checks after group home.  Patient has become upset by the frequent checks.  Patient states that her freedom has been taken by the frequent checks.  Denies any other symptoms at this time.        Review of Systems   Psychiatric/Behavioral:  Positive for suicidal ideas.    All other systems reviewed and are negative.          Objective       ED Triage Vitals   Temperature Pulse Blood Pressure Respirations SpO2 Patient Position - Orthostatic VS   05/24/25 1837 05/24/25 1839 05/24/25 1839 05/24/25 1839 05/24/25 1839 05/24/25 1839   98 °F (36.7 °C) 99 120/67 18 98 % Sitting      Temp src Heart Rate Source BP Location FiO2 (%) Pain Score    -- 05/24/25 1839 05/24/25 1839 -- --     Monitor Left arm        Vitals      Date and Time Temp Pulse SpO2 Resp BP Pain Score FACES Pain Rating User   05/24/25 1839 -- 99 98 % 18 120/67 -- -- ML   05/24/25 1837 98 °F (36.7 °C) -- -- -- -- -- -- ML            Physical Exam  Vitals and nursing note reviewed.   Constitutional:       General: She is not in acute distress.     Appearance: Normal appearance. She is well-developed. She is not ill-appearing, toxic-appearing or diaphoretic.   HENT:      Head: Normocephalic and atraumatic.     Eyes:      Conjunctiva/sclera: Conjunctivae normal.       Cardiovascular:      Rate and Rhythm: Normal rate and regular rhythm.      Pulses: Normal pulses.      Heart sounds: Normal heart sounds. No murmur heard.     No friction rub. No gallop.   Pulmonary:      Effort: Pulmonary effort is normal. No respiratory  distress.      Breath sounds: Normal breath sounds. No stridor. No wheezing, rhonchi or rales.   Abdominal:      Palpations: Abdomen is soft.      Tenderness: There is no abdominal tenderness.     Musculoskeletal:         General: No swelling.      Cervical back: Neck supple.     Skin:     General: Skin is warm and dry.      Capillary Refill: Capillary refill takes less than 2 seconds.     Neurological:      Mental Status: She is alert.     Psychiatric:         Mood and Affect: Mood normal.         Thought Content: Thought content includes suicidal ideation. Thought content does not include homicidal ideation. Thought content does not include homicidal or suicidal plan.         Results Reviewed       Procedure Component Value Units Date/Time    Rapid drug screen, urine [[id number]]  (Normal) Collected: 05/24/25 1900    Lab Status: Final result Specimen: Urine, Other Updated: 05/24/25 1917     Amph/Meth UR Negative     Barbiturate Ur Negative     Benzodiazepine Urine Negative     Cocaine Urine Negative     Methadone Urine Negative     Opiate Urine Negative     PCP Ur Negative     THC Urine Negative     Oxycodone Urine Negative     Fentanyl Urine Negative     HYDROCODONE URINE Negative    Narrative:      FOR MEDICAL PURPOSES ONLY.   IF CONFIRMATION NEEDED PLEASE CONTACT THE LAB WITHIN 5 DAYS.    Drug Screen Cutoff Levels:  AMPHETAMINE/METHAMPHETAMINES  1000 ng/mL  BARBITURATES     200 ng/mL  BENZODIAZEPINES     200 ng/mL  COCAINE      300 ng/mL  METHADONE      300 ng/mL  OPIATES      300 ng/mL  PHENCYCLIDINE     25 ng/mL  THC       50 ng/mL  OXYCODONE      100 ng/mL  FENTANYL      5 ng/mL  HYDROCODONE     300 ng/mL    POCT pregnancy, urine [[id number]]  (Normal) Collected: 05/24/25 1901    Lab Status: Final result Updated: 05/24/25 1901     EXT Preg Test, Ur Negative     Control Valid    POCT alcohol breath test [[id number]]  (Normal) Collected: 05/24/25 1852    Lab Status: Final result Updated: 05/24/25 1852      EXTBreath Alcohol 0.00            No orders to display       Procedures    ED Medication and Procedure Management   Prior to Admission Medications   Prescriptions Last Dose Informant Patient Reported? Taking?   Cholecalciferol (VITAMIN D3) 1,000 units tablet   No No   Sig: Take 2 tablets (2,000 Units total) by mouth daily   Norgestimate-Eth Estradiol (Tri-Lo-Sprintec) 0.18/0.215/0.25 MG-25 MCG TABS   No No   Sig: Take 1 tablet by mouth in the morning   benztropine (COGENTIN) 1 mg tablet   No No   Sig: Take 1 tablet (1 mg total) by mouth 2 (two) times a day as needed for tremors   citalopram (CeleXA) 10 mg tablet   No No   Sig: Take 1 tablet (10 mg total) by mouth daily   cyanocobalamin (VITAMIN B-12) 1000 MCG tablet   No No   Sig: Take 1 tablet (1,000 mcg total) by mouth daily   divalproex sodium (DEPAKOTE ER) 500 mg 24 hr tablet   No No   Sig: Take 3 tablets (1,500 mg total) by mouth daily at bedtime   famotidine (PEPCID) 20 mg tablet   No No   Sig: Take 1 tablet (20 mg total) by mouth 2 (two) times a day as needed for heartburn   gabapentin (NEURONTIN) 300 mg capsule   No No   Sig: Take 1 capsule (300 mg total) by mouth 3 (three) times a day   levothyroxine 25 mcg tablet   No No   Sig: Take 1 tablet (25 mcg total) by mouth daily   loratadine (CLARITIN) 10 mg tablet   No No   Sig: Take 1 tablet (10 mg total) by mouth daily   polyethylene glycol (MIRALAX) 17 g packet   No No   Sig: Take 17 g by mouth daily as needed (constipation refractory to Senokot-S)   risperiDONE (RisperDAL) 2 mg tablet   No No   Sig: Take 1 tablet (2 mg total) by mouth 3 (three) times a day   senna (SENOKOT) 8.6 mg   No No   Sig: Take 1 tablet (8.6 mg total) by mouth 2 (two) times a day   traZODone (DESYREL) 100 mg tablet   No No   Sig: Take 1 tablet (100 mg total) by mouth daily at bedtime      Facility-Administered Medications: None     Patient's Medications   Discharge Prescriptions    No medications on file     No discharge procedures on  file.  ED SEPSIS DOCUMENTATION   Time reflects when diagnosis was documented in both MDM as applicable and the Disposition within this note       Time User Action Codes Description Comment    5/24/2025  8:49 PM Riaz Zaidi [R45.851] Suicidal ideations     5/24/2025  9:10 PM Riaz Zaidi [Z00.8] Encounter for psychological evaluation                      [1]   Past Medical History:  Diagnosis Date    Anxiety     Autism spectrum disorder     Bipolar disorder (HCC)     Bruxism     Fracture of skull (HCC)     GERD (gastroesophageal reflux disease)     Hyperprolactinemia (HCC)     Hypertriglyceridemia     Hypothyroidism     Obsessive-compulsive disorder     Oppositional defiant disorder     Seasonal allergies     Seizures (HCC)     Subarachnoid hemorrhage (HCC)     Traumatic brain injury (HCC)    [2]   Past Surgical History:  Procedure Laterality Date    NO PAST SURGERIES     [3]   Family History  Adopted: Yes   Family history unknown: Yes   [4]   Social History  Tobacco Use    Smoking status: Never    Smokeless tobacco: Never   Vaping Use    Vaping status: Never Used   Substance Use Topics    Alcohol use: No    Drug use: Never        Riaz Zaidi PA-C  05/24/25 1773

## 2025-05-24 NOTE — ED NOTES
Crisis met with patient in ED 11 to complete intake and safety risk assessment. Patient calm and cooperative during assessment. Patient has a history of Bipolar I disorder, traumatic brain injury, and autism spectrum disorder. Patient's last hospitalization was in March at [facility] for aggressive behaviors. Patient reports that on Wednesday she had a stressful day at her Day Program and tried to speak to her mother that night who cut her off and ended the call. Patient reports after that she began having SI with a plan to walk into traffic. Patient then reported this to her staff at her group home through [facility] and her therapist. Staff were then required to perform 15 minutes safety checks. Patient reports that this made her upset because she felt like she was having her freedom taken away. Patient states that she is also upset because she has not been able to visit her mother since Christmas and she enjoys doing this because it gives her a break from being in the group home. Patient reports that her SI has been persistent since Wednesday and today she began to felt unsafe and that she might act on her thoughts. Patient reports that she did take her PRN for anxiety and this did not help to alleviate her anger or the suicidal thoughts. Patient denies past history of Suicide Attempts. Patient denies SI or thoughts to want to hurt other people. Denies AVH and SIB. Patient is able to complete her own ADLs. Patient is willing to sign 201 for inpatient treatment. Rights and restrictions provided.

## 2025-05-24 NOTE — ED NOTES
Patient wearing glasses and sports bra under green scrubs. Sports bra checked by this RN. Pt also wearing med alert bracelet on L wrist.        Veronica Aguillon RN  05/24/25 6543

## 2025-05-25 VITALS
TEMPERATURE: 97.9 F | HEART RATE: 92 BPM | DIASTOLIC BLOOD PRESSURE: 69 MMHG | OXYGEN SATURATION: 97 % | SYSTOLIC BLOOD PRESSURE: 133 MMHG | RESPIRATION RATE: 16 BRPM

## 2025-05-25 RX ORDER — ACETAMINOPHEN 325 MG/1
650 TABLET ORAL ONCE
Status: COMPLETED | OUTPATIENT
Start: 2025-05-25 | End: 2025-05-25

## 2025-05-25 RX ADMIN — NORGESTIMATE AND ETHINYL ESTRADIOL 1 TABLET: KIT at 11:09

## 2025-05-25 RX ADMIN — GABAPENTIN 300 MG: 300 CAPSULE ORAL at 08:55

## 2025-05-25 RX ADMIN — LORATADINE 10 MG: 10 TABLET ORAL at 08:54

## 2025-05-25 RX ADMIN — RISPERIDONE 2 MG: 2 TABLET, FILM COATED ORAL at 00:00

## 2025-05-25 RX ADMIN — RISPERIDONE 2 MG: 2 TABLET, FILM COATED ORAL at 08:54

## 2025-05-25 RX ADMIN — SENNOSIDES 8.6 MG: 8.6 TABLET, FILM COATED ORAL at 08:54

## 2025-05-25 RX ADMIN — LEVOTHYROXINE SODIUM 25 MCG: 0.03 TABLET ORAL at 07:00

## 2025-05-25 RX ADMIN — ACETAMINOPHEN 650 MG: 325 TABLET, FILM COATED ORAL at 11:14

## 2025-05-25 RX ADMIN — CYANOCOBALAMIN TAB 500 MCG 1000 MCG: 500 TAB at 08:54

## 2025-05-25 RX ADMIN — CITALOPRAM HYDROBROMIDE 30 MG: 10 TABLET ORAL at 08:54

## 2025-05-25 RX ADMIN — Medication 2000 UNITS: at 08:55

## 2025-05-25 NOTE — ED NOTES
201 and face sheet emailed to intake. No current intake workers available to check on network bed status.    Bed Search:    [facility] - faxed clinicals  [facility] - faxed clinicals   [facility] - no beds  [facility] - no beds  [facility] - faxed clinicals  [facility] - faxed clinicals

## 2025-05-25 NOTE — ED NOTES
Virtual observation back online. POA no longer in room for 1:1     Aida Moreno RN  05/25/25 5011

## 2025-05-25 NOTE — ED NOTES
Called [facility] x2 without any response. Message was left on voicemail to request medication. Medication not available at hospital.      Nicola Barrow RN  05/25/25 0837

## 2025-05-25 NOTE — ED NOTES
Marika from [facility] called. Patient declined. Do not have an appropriate bed at this time.    Yodit from [facility] called. Patient declined due to not having an appropriate bed at this time. Can check back tomorrow for discharges and patient can be re-reviewed.

## 2025-05-25 NOTE — ED NOTES
Pt given sandwich-- snack box checked prior to being given.     Aida Moreno, PRIYANKA  05/24/25 9759

## 2025-05-25 NOTE — ED NOTES
Call placed to [facility] to follow up on chart review, spoke with Ashley, who expressed that chart is currently being reviewed by admin.     Krystin Wallace, ELLISW  05/25/25     0051

## 2025-05-25 NOTE — EMTALA/ACUTE CARE TRANSFER
[facility] EMERGENCY DEPARTMENT  [address] [facility] [address]  Dept: [phone]      EMTALA TRANSFER CONSENT    NAME Tameka Alvarado                                         1995                              MRN [id number]    I have been informed of my rights regarding examination, treatment, and transfer   by Dr. Zaki Mahmood DO    Benefits:      Risks:        Consent for Transfer:  I acknowledge that my medical condition has been evaluated and explained to me by the emergency department physician or other qualified medical person and/or my attending physician, who has recommended that I be transferred to the service of  Accepting Physician: DR. GRIER at Accepting Facility Name, City & State : [facility]. The above potential benefits of such transfer, the potential risks associated with such transfer, and the probable risks of not being transferred have been explained to me, and I fully understand them.  The doctor has explained that, in my case, the benefits of transfer outweigh the risks.  I agree to be transferred.    I authorize the performance of emergency medical procedures and treatments upon me in both transit and upon arrival at the receiving facility.  Additionally, I authorize the release of any and all medical records to the receiving facility and request they be transported with me, if possible.  I understand that the safest mode of transportation during a medical emergency is an ambulance and that the Hospital advocates the use of this mode of transport. Risks of traveling to the receiving facility by car, including absence of medical control, life sustaining equipment, such as oxygen, and medical personnel has been explained to me and I fully understand them.    (ALIRIO CORRECT BOX BELOW)  [  ]  I consent to the stated transfer and to be transported by ambulance/helicopter.  [  ]  I consent to the stated transfer, but  refuse transportation by ambulance and accept full responsibility for my transportation by car.  I understand the risks of non-ambulance transfers and I exonerate the Hospital and its staff from any deterioration in my condition that results from this refusal.    X___________________________________________    DATE  25  TIME________  Signature of patient or legally responsible individual signing on patient behalf           RELATIONSHIP TO PATIENT_________________________          Provider Certification    NAME Tameka Alvarado                                         1995                              MRN [id number]    A medical screening exam was performed on the above named patient.  Based on the examination:    Condition Necessitating Transfer The primary encounter diagnosis was Suicidal ideations. A diagnosis of Encounter for psychological evaluation was also pertinent to this visit.    Patient Condition:      Reason for Transfer:      Transfer Requirements: Facility [facility] [place] [place]   Space available and qualified personnel available for treatment as acknowledged by [facility] ([facility]) [phone]  Agreed to accept transfer and to provide appropriate medical treatment as acknowledged by       DR. GRIER  Appropriate medical records of the examination and treatment of the patient are provided at the time of transfer   STAFF INITIAL WHEN COMPLETED _______  Transfer will be performed by qualified personnel from    and appropriate transfer equipment as required, including the use of necessary and appropriate life support measures.    Provider Certification: I have examined the patient and explained the following risks and benefits of being transferred/refusing transfer to the patient/family:         Based on these reasonable risks and benefits to the patient and/or the unborn child(dino), and based upon the information available at the time of the patient’s examination, I certify that the  medical benefits reasonably to be expected from the provision of appropriate medical treatments at another medical facility outweigh the increasing risks, if any, to the individual’s medical condition, and in the case of labor to the unborn child, from effecting the transfer.    X____________________________________________ DATE 05/25/25        TIME_______      ORIGINAL - SEND TO MEDICAL RECORDS   COPY - SEND WITH PATIENT DURING TRANSFER

## 2025-05-25 NOTE — ED NOTES
Patient is accepted at [facility]   Patient is accepted by Dr. Rodo River (who informed me that [facility] will complete the Pre-Cert)    Transportation is arranged with [facility].     Transportation is scheduled for 5/25/25 @1230 via [facility]  Patient may go to the floor at 1230            Lc Guerra  Crisis Intervention Specialist II